# Patient Record
Sex: MALE | Race: WHITE | NOT HISPANIC OR LATINO
[De-identification: names, ages, dates, MRNs, and addresses within clinical notes are randomized per-mention and may not be internally consistent; named-entity substitution may affect disease eponyms.]

---

## 2017-12-23 ENCOUNTER — HOSPITAL (OUTPATIENT)
Dept: OTHER | Age: 32
End: 2017-12-23
Attending: NEUROLOGICAL SURGERY

## 2020-04-03 PROBLEM — R13.10 ODYNOPHAGIA: Status: ACTIVE | Noted: 2020-04-03

## 2020-04-03 PROBLEM — R14.0 BLOATING: Status: ACTIVE | Noted: 2020-04-03

## 2020-04-03 PROBLEM — K21.9 GASTROESOPHAGEAL REFLUX DISEASE WITHOUT ESOPHAGITIS: Status: ACTIVE | Noted: 2020-04-03

## 2023-05-25 ENCOUNTER — HOSPITAL ENCOUNTER (OUTPATIENT)
Age: 38
Discharge: HOME | End: 2023-05-25
Payer: COMMERCIAL

## 2023-05-25 VITALS
SYSTOLIC BLOOD PRESSURE: 151 MMHG | OXYGEN SATURATION: 99 % | HEART RATE: 80 BPM | RESPIRATION RATE: 16 BRPM | TEMPERATURE: 98 F | DIASTOLIC BLOOD PRESSURE: 86 MMHG

## 2023-05-25 DIAGNOSIS — J02.9 ACUTE VIRAL PHARYNGITIS: Primary | ICD-10-CM

## 2023-05-25 LAB — S PYO AG THROAT QL: NEGATIVE

## 2023-05-25 RX ORDER — AMOXICILLIN 500 MG/1
500 TABLET, FILM COATED ORAL 2 TIMES DAILY
Qty: 20 TABLET | Refills: 0 | Status: SHIPPED | OUTPATIENT
Start: 2023-05-25 | End: 2023-06-04

## 2023-05-25 NOTE — DISCHARGE INSTRUCTIONS
Year rapid strep swab was negative, however, due to symptoms, physical exam and positive strep contacts at home, will prescribe antibiotics. Watch and wait antibiotics. If your symptoms worsen in the next 24 to 48 hours, you may initiate antibiotics. Tylenol before hours and Motrin for 6 hours. Drink plenty of water stay well-hydrated. Okaying cessation as smoke will irritate throat more. Avoid foods that are fatty, fried, spicy, citrus, acidic. Avoid carbonated, acidic, citrus beverages. If you have any new or worsening pain, chest pain, dizziness, lightheadedness, palpitation, shortness of breath, please go to ER.

## 2023-07-19 ENCOUNTER — HOSPITAL ENCOUNTER (OUTPATIENT)
Age: 38
Discharge: HOME | End: 2023-07-19
Attending: EMERGENCY MEDICINE
Payer: COMMERCIAL

## 2023-07-19 VITALS
SYSTOLIC BLOOD PRESSURE: 133 MMHG | OXYGEN SATURATION: 98 % | RESPIRATION RATE: 20 BRPM | DIASTOLIC BLOOD PRESSURE: 87 MMHG | HEART RATE: 90 BPM | TEMPERATURE: 97 F

## 2023-07-19 DIAGNOSIS — S39.012A STRAIN OF LUMBAR REGION, INITIAL ENCOUNTER: Primary | ICD-10-CM

## 2023-07-19 LAB
BILIRUB UR QL STRIP: NEGATIVE
CLARITY UR: CLEAR
COLOR UR: YELLOW
GLUCOSE UR STRIP-MCNC: NEGATIVE MG/DL
LEUKOCYTE ESTERASE UR QL STRIP: NEGATIVE
NITRITE UR QL STRIP: NEGATIVE
PH UR STRIP: 5.5 [PH]
PROT UR STRIP-MCNC: NEGATIVE MG/DL
SP GR UR STRIP: 1.02
UROBILINOGEN UR STRIP-ACNC: <2 MG/DL

## 2023-07-19 PROCEDURE — 99213 OFFICE O/P EST LOW 20 MIN: CPT

## 2023-07-19 PROCEDURE — 99214 OFFICE O/P EST MOD 30 MIN: CPT

## 2023-07-19 PROCEDURE — 81002 URINALYSIS NONAUTO W/O SCOPE: CPT

## 2023-07-19 RX ORDER — CYCLOBENZAPRINE HCL 5 MG
5 TABLET ORAL 3 TIMES DAILY PRN
Qty: 21 TABLET | Refills: 0 | Status: SHIPPED | OUTPATIENT
Start: 2023-07-19

## 2023-11-07 ENCOUNTER — HOSPITAL ENCOUNTER (EMERGENCY)
Facility: HOSPITAL | Age: 38
Discharge: HOME | End: 2023-11-07
Attending: EMERGENCY MEDICINE
Payer: COMMERCIAL

## 2023-11-07 VITALS
RESPIRATION RATE: 15 BRPM | HEART RATE: 67 BPM | OXYGEN SATURATION: 100 % | SYSTOLIC BLOOD PRESSURE: 107 MMHG | DIASTOLIC BLOOD PRESSURE: 67 MMHG | TEMPERATURE: 99 F

## 2023-11-07 DIAGNOSIS — M54.31 SCIATICA OF RIGHT SIDE: Primary | ICD-10-CM

## 2023-11-07 PROCEDURE — 99283 EMERGENCY DEPT VISIT LOW MDM: CPT

## 2023-11-07 RX ORDER — HYDROCODONE BITARTRATE AND ACETAMINOPHEN 5; 325 MG/1; MG/1
1 TABLET ORAL ONCE
Status: COMPLETED | OUTPATIENT
Start: 2023-11-07 | End: 2023-11-07

## 2023-11-07 RX ORDER — GABAPENTIN 300 MG/1
CAPSULE ORAL
Qty: 87 CAPSULE | Refills: 0 | Status: SHIPPED | OUTPATIENT
Start: 2023-11-07 | End: 2023-12-06

## 2023-11-07 RX ORDER — PREDNISONE 20 MG/1
60 TABLET ORAL DAILY
Qty: 15 TABLET | Refills: 0 | Status: ON HOLD | OUTPATIENT
Start: 2023-11-07 | End: 2023-11-08

## 2023-11-07 RX ORDER — HYDROCODONE BITARTRATE AND ACETAMINOPHEN 7.5; 325 MG/1; MG/1
1 TABLET ORAL EVERY 6 HOURS PRN
Qty: 10 TABLET | Refills: 0 | Status: SHIPPED | OUTPATIENT
Start: 2023-11-07 | End: 2023-11-08

## 2023-11-07 NOTE — ED INITIAL ASSESSMENT (HPI)
Pt to ED via triage c/o L4 L5 back pain. Per patient, he has a hx of herniation and being managed with therapy, but feels he re injured it last night around 1700. Pain is all throughout back and down RLE and groin. RLE pain has been ongoing x 6 mo. No urinary or bowel incontinence.  Denies numbness/tingling

## 2023-11-08 ENCOUNTER — ANESTHESIA EVENT (OUTPATIENT)
Dept: SURGERY | Facility: HOSPITAL | Age: 38
End: 2023-11-08
Payer: COMMERCIAL

## 2023-11-08 ENCOUNTER — APPOINTMENT (OUTPATIENT)
Dept: MRI IMAGING | Facility: HOSPITAL | Age: 38
End: 2023-11-08
Attending: EMERGENCY MEDICINE
Payer: COMMERCIAL

## 2023-11-08 ENCOUNTER — APPOINTMENT (OUTPATIENT)
Dept: GENERAL RADIOLOGY | Facility: HOSPITAL | Age: 38
End: 2023-11-08
Attending: NEUROLOGICAL SURGERY
Payer: COMMERCIAL

## 2023-11-08 ENCOUNTER — APPOINTMENT (OUTPATIENT)
Dept: MRI IMAGING | Facility: HOSPITAL | Age: 38
DRG: 519 | End: 2023-11-08
Attending: EMERGENCY MEDICINE
Payer: COMMERCIAL

## 2023-11-08 ENCOUNTER — APPOINTMENT (OUTPATIENT)
Dept: GENERAL RADIOLOGY | Facility: HOSPITAL | Age: 38
DRG: 519 | End: 2023-11-08
Attending: NEUROLOGICAL SURGERY
Payer: COMMERCIAL

## 2023-11-08 ENCOUNTER — HOSPITAL ENCOUNTER (OUTPATIENT)
Facility: HOSPITAL | Age: 38
Setting detail: OBSERVATION
Discharge: HOME | DRG: 519 | End: 2023-11-09
Attending: EMERGENCY MEDICINE | Admitting: INTERNAL MEDICINE
Payer: COMMERCIAL

## 2023-11-08 ENCOUNTER — HOSPITAL ENCOUNTER (OUTPATIENT)
Facility: HOSPITAL | Age: 38
Setting detail: OBSERVATION
Discharge: HOME | End: 2023-11-09
Attending: EMERGENCY MEDICINE | Admitting: INTERNAL MEDICINE
Payer: COMMERCIAL

## 2023-11-08 ENCOUNTER — HOSPITAL ENCOUNTER (INPATIENT)
Facility: HOSPITAL | Age: 38
LOS: 1 days | Discharge: HOME | End: 2023-11-09
Attending: EMERGENCY MEDICINE | Admitting: INTERNAL MEDICINE
Payer: COMMERCIAL

## 2023-11-08 ENCOUNTER — ANESTHESIA (OUTPATIENT)
Dept: SURGERY | Facility: HOSPITAL | Age: 38
End: 2023-11-08
Payer: COMMERCIAL

## 2023-11-08 DIAGNOSIS — G83.4 CAUDA EQUINA COMPRESSION (HCC): Primary | ICD-10-CM

## 2023-11-08 LAB
ANION GAP SERPL CALC-SCNC: 5 MMOL/L (ref 0–18)
BASOPHILS # BLD AUTO: 0.05 X10(3) UL (ref 0–0.2)
BASOPHILS NFR BLD AUTO: 0.7 %
BUN BLD-MCNC: 10 MG/DL (ref 9–23)
BUN/CREAT SERPL: 9.3 (ref 10–20)
CALCIUM BLD-MCNC: 9.9 MG/DL (ref 8.7–10.4)
CHLORIDE SERPL-SCNC: 105 MMOL/L (ref 98–112)
CO2 SERPL-SCNC: 29 MMOL/L (ref 21–32)
CREAT BLD-MCNC: 1.08 MG/DL
DEPRECATED RDW RBC AUTO: 43.3 FL (ref 35.1–46.3)
EGFRCR SERPLBLD CKD-EPI 2021: 89 ML/MIN/1.73M2 (ref 60–?)
EOSINOPHIL # BLD AUTO: 0.09 X10(3) UL (ref 0–0.7)
EOSINOPHIL NFR BLD AUTO: 1.2 %
ERYTHROCYTE [DISTWIDTH] IN BLOOD BY AUTOMATED COUNT: 12.4 % (ref 11–15)
GLUCOSE BLD-MCNC: 108 MG/DL (ref 70–99)
HCT VFR BLD AUTO: 45 %
HGB BLD-MCNC: 15.8 G/DL
IMM GRANULOCYTES # BLD AUTO: 0.03 X10(3) UL (ref 0–1)
IMM GRANULOCYTES NFR BLD: 0.4 %
LYMPHOCYTES # BLD AUTO: 1.87 X10(3) UL (ref 1–4)
LYMPHOCYTES NFR BLD AUTO: 25.1 %
MCH RBC QN AUTO: 32.8 PG (ref 26–34)
MCHC RBC AUTO-ENTMCNC: 35.1 G/DL (ref 31–37)
MCV RBC AUTO: 93.6 FL
MONOCYTES # BLD AUTO: 0.52 X10(3) UL (ref 0.1–1)
MONOCYTES NFR BLD AUTO: 7 %
NEUTROPHILS # BLD AUTO: 4.9 X10 (3) UL (ref 1.5–7.7)
NEUTROPHILS # BLD AUTO: 4.9 X10(3) UL (ref 1.5–7.7)
NEUTROPHILS NFR BLD AUTO: 65.6 %
OSMOLALITY SERPL CALC.SUM OF ELEC: 288 MOSM/KG (ref 275–295)
PLATELET # BLD AUTO: 180 10(3)UL (ref 150–450)
POTASSIUM SERPL-SCNC: 4 MMOL/L (ref 3.5–5.1)
RBC # BLD AUTO: 4.81 X10(6)UL
SODIUM SERPL-SCNC: 139 MMOL/L (ref 136–145)
WBC # BLD AUTO: 7.5 X10(3) UL (ref 4–11)

## 2023-11-08 PROCEDURE — 99223 1ST HOSP IP/OBS HIGH 75: CPT | Performed by: INTERNAL MEDICINE

## 2023-11-08 PROCEDURE — 72148 MRI LUMBAR SPINE W/O DYE: CPT | Performed by: EMERGENCY MEDICINE

## 2023-11-08 PROCEDURE — 00NY0ZZ RELEASE LUMBAR SPINAL CORD, OPEN APPROACH: ICD-10-PCS | Performed by: NEUROLOGICAL SURGERY

## 2023-11-08 PROCEDURE — 0SB20ZZ EXCISION OF LUMBAR VERTEBRAL DISC, OPEN APPROACH: ICD-10-PCS | Performed by: NEUROLOGICAL SURGERY

## 2023-11-08 PROCEDURE — 76000 FLUOROSCOPY <1 HR PHYS/QHP: CPT | Performed by: NEUROLOGICAL SURGERY

## 2023-11-08 RX ORDER — BUPIVACAINE HYDROCHLORIDE AND EPINEPHRINE 5; 5 MG/ML; UG/ML
INJECTION, SOLUTION PERINEURAL AS NEEDED
Status: DISCONTINUED | OUTPATIENT
Start: 2023-11-08 | End: 2023-11-08 | Stop reason: HOSPADM

## 2023-11-08 RX ORDER — NALOXONE HYDROCHLORIDE 0.4 MG/ML
0.08 INJECTION, SOLUTION INTRAMUSCULAR; INTRAVENOUS; SUBCUTANEOUS AS NEEDED
Status: DISCONTINUED | OUTPATIENT
Start: 2023-11-08 | End: 2023-11-08 | Stop reason: HOSPADM

## 2023-11-08 RX ORDER — IBUPROFEN 600 MG/1
600 TABLET ORAL ONCE
COMMUNITY
End: 2023-11-09

## 2023-11-08 RX ORDER — MORPHINE SULFATE 4 MG/ML
2 INJECTION, SOLUTION INTRAMUSCULAR; INTRAVENOUS EVERY 10 MIN PRN
Status: DISCONTINUED | OUTPATIENT
Start: 2023-11-08 | End: 2023-11-08 | Stop reason: HOSPADM

## 2023-11-08 RX ORDER — LIDOCAINE HYDROCHLORIDE 10 MG/ML
INJECTION, SOLUTION EPIDURAL; INFILTRATION; INTRACAUDAL; PERINEURAL AS NEEDED
Status: DISCONTINUED | OUTPATIENT
Start: 2023-11-08 | End: 2023-11-08 | Stop reason: SURG

## 2023-11-08 RX ORDER — MAGNESIUM SULFATE HEPTAHYDRATE 500 MG/ML
INJECTION, SOLUTION INTRAMUSCULAR; INTRAVENOUS AS NEEDED
Status: DISCONTINUED | OUTPATIENT
Start: 2023-11-08 | End: 2023-11-08 | Stop reason: SURG

## 2023-11-08 RX ORDER — BUPROPION HYDROCHLORIDE 300 MG/1
300 TABLET ORAL
Status: DISCONTINUED | OUTPATIENT
Start: 2023-11-08 | End: 2023-11-09

## 2023-11-08 RX ORDER — ENEMA 19; 7 G/133ML; G/133ML
1 ENEMA RECTAL ONCE AS NEEDED
Status: DISCONTINUED | OUTPATIENT
Start: 2023-11-08 | End: 2023-11-09

## 2023-11-08 RX ORDER — SODIUM CHLORIDE, SODIUM LACTATE, POTASSIUM CHLORIDE, CALCIUM CHLORIDE 600; 310; 30; 20 MG/100ML; MG/100ML; MG/100ML; MG/100ML
INJECTION, SOLUTION INTRAVENOUS CONTINUOUS PRN
Status: DISCONTINUED | OUTPATIENT
Start: 2023-11-08 | End: 2023-11-08 | Stop reason: SURG

## 2023-11-08 RX ORDER — DOCUSATE SODIUM 100 MG/1
100 CAPSULE, LIQUID FILLED ORAL 2 TIMES DAILY
Status: DISCONTINUED | OUTPATIENT
Start: 2023-11-08 | End: 2023-11-09

## 2023-11-08 RX ORDER — HYDROMORPHONE HYDROCHLORIDE 1 MG/ML
0.2 INJECTION, SOLUTION INTRAMUSCULAR; INTRAVENOUS; SUBCUTANEOUS EVERY 5 MIN PRN
Status: DISCONTINUED | OUTPATIENT
Start: 2023-11-08 | End: 2023-11-08 | Stop reason: HOSPADM

## 2023-11-08 RX ORDER — HYDROCODONE BITARTRATE AND ACETAMINOPHEN 5; 325 MG/1; MG/1
1 TABLET ORAL EVERY 4 HOURS PRN
Status: DISCONTINUED | OUTPATIENT
Start: 2023-11-08 | End: 2023-11-09

## 2023-11-08 RX ORDER — PANTOPRAZOLE SODIUM 40 MG/1
40 TABLET, DELAYED RELEASE ORAL
Status: DISCONTINUED | OUTPATIENT
Start: 2023-11-09 | End: 2023-11-09

## 2023-11-08 RX ORDER — DIPHENHYDRAMINE HYDROCHLORIDE 50 MG/ML
25 INJECTION INTRAMUSCULAR; INTRAVENOUS EVERY 4 HOURS PRN
Status: DISCONTINUED | OUTPATIENT
Start: 2023-11-08 | End: 2023-11-09

## 2023-11-08 RX ORDER — PROCHLORPERAZINE EDISYLATE 5 MG/ML
5 INJECTION INTRAMUSCULAR; INTRAVENOUS EVERY 8 HOURS PRN
Status: DISCONTINUED | OUTPATIENT
Start: 2023-11-08 | End: 2023-11-09

## 2023-11-08 RX ORDER — CEFAZOLIN SODIUM/WATER 2 G/20 ML
2 SYRINGE (ML) INTRAVENOUS ONCE
Status: COMPLETED | OUTPATIENT
Start: 2023-11-08 | End: 2023-11-08

## 2023-11-08 RX ORDER — METHYLPREDNISOLONE ACETATE 40 MG/ML
INJECTION, SUSPENSION INTRA-ARTICULAR; INTRALESIONAL; INTRAMUSCULAR; SOFT TISSUE AS NEEDED
Status: DISCONTINUED | OUTPATIENT
Start: 2023-11-08 | End: 2023-11-08 | Stop reason: HOSPADM

## 2023-11-08 RX ORDER — HYDROMORPHONE HYDROCHLORIDE 1 MG/ML
0.6 INJECTION, SOLUTION INTRAMUSCULAR; INTRAVENOUS; SUBCUTANEOUS EVERY 5 MIN PRN
Status: DISCONTINUED | OUTPATIENT
Start: 2023-11-08 | End: 2023-11-08 | Stop reason: HOSPADM

## 2023-11-08 RX ORDER — ORPHENADRINE CITRATE 30 MG/ML
30 INJECTION INTRAMUSCULAR; INTRAVENOUS ONCE
Status: DISCONTINUED | OUTPATIENT
Start: 2023-11-08 | End: 2023-11-08

## 2023-11-08 RX ORDER — HYDROMORPHONE HYDROCHLORIDE 1 MG/ML
0.2 INJECTION, SOLUTION INTRAMUSCULAR; INTRAVENOUS; SUBCUTANEOUS EVERY 2 HOUR PRN
Status: DISCONTINUED | OUTPATIENT
Start: 2023-11-08 | End: 2023-11-09

## 2023-11-08 RX ORDER — ROCURONIUM BROMIDE 10 MG/ML
INJECTION, SOLUTION INTRAVENOUS AS NEEDED
Status: DISCONTINUED | OUTPATIENT
Start: 2023-11-08 | End: 2023-11-08 | Stop reason: SURG

## 2023-11-08 RX ORDER — MORPHINE SULFATE 4 MG/ML
4 INJECTION, SOLUTION INTRAMUSCULAR; INTRAVENOUS ONCE
Status: DISCONTINUED | OUTPATIENT
Start: 2023-11-08 | End: 2023-11-08

## 2023-11-08 RX ORDER — BUPROPION HYDROCHLORIDE 300 MG/1
300 TABLET ORAL
COMMUNITY
Start: 2023-09-13

## 2023-11-08 RX ORDER — SENNOSIDES 8.6 MG
17.2 TABLET ORAL NIGHTLY
Status: DISCONTINUED | OUTPATIENT
Start: 2023-11-08 | End: 2023-11-09

## 2023-11-08 RX ORDER — BISACODYL 10 MG
10 SUPPOSITORY, RECTAL RECTAL
Status: DISCONTINUED | OUTPATIENT
Start: 2023-11-08 | End: 2023-11-09

## 2023-11-08 RX ORDER — ONDANSETRON 2 MG/ML
INJECTION INTRAMUSCULAR; INTRAVENOUS AS NEEDED
Status: DISCONTINUED | OUTPATIENT
Start: 2023-11-08 | End: 2023-11-08 | Stop reason: SURG

## 2023-11-08 RX ORDER — HYDROCODONE BITARTRATE AND ACETAMINOPHEN 5; 325 MG/1; MG/1
1 TABLET ORAL EVERY 6 HOURS PRN
Qty: 28 TABLET | Refills: 0 | Status: SHIPPED | OUTPATIENT
Start: 2023-11-08 | End: 2023-11-15

## 2023-11-08 RX ORDER — ORPHENADRINE CITRATE 30 MG/ML
30 INJECTION INTRAMUSCULAR; INTRAVENOUS ONCE
Status: COMPLETED | OUTPATIENT
Start: 2023-11-08 | End: 2023-11-08

## 2023-11-08 RX ORDER — ONDANSETRON 2 MG/ML
4 INJECTION INTRAMUSCULAR; INTRAVENOUS EVERY 6 HOURS PRN
Status: DISCONTINUED | OUTPATIENT
Start: 2023-11-08 | End: 2023-11-09

## 2023-11-08 RX ORDER — KETAMINE HYDROCHLORIDE 50 MG/ML
INJECTION, SOLUTION, CONCENTRATE INTRAMUSCULAR; INTRAVENOUS AS NEEDED
Status: DISCONTINUED | OUTPATIENT
Start: 2023-11-08 | End: 2023-11-08 | Stop reason: SURG

## 2023-11-08 RX ORDER — MORPHINE SULFATE 4 MG/ML
4 INJECTION, SOLUTION INTRAMUSCULAR; INTRAVENOUS EVERY 10 MIN PRN
Status: DISCONTINUED | OUTPATIENT
Start: 2023-11-08 | End: 2023-11-08 | Stop reason: HOSPADM

## 2023-11-08 RX ORDER — HYDROMORPHONE HYDROCHLORIDE 1 MG/ML
0.4 INJECTION, SOLUTION INTRAMUSCULAR; INTRAVENOUS; SUBCUTANEOUS EVERY 5 MIN PRN
Status: DISCONTINUED | OUTPATIENT
Start: 2023-11-08 | End: 2023-11-08 | Stop reason: HOSPADM

## 2023-11-08 RX ORDER — MORPHINE SULFATE 10 MG/ML
6 INJECTION, SOLUTION INTRAMUSCULAR; INTRAVENOUS EVERY 10 MIN PRN
Status: DISCONTINUED | OUTPATIENT
Start: 2023-11-08 | End: 2023-11-08 | Stop reason: HOSPADM

## 2023-11-08 RX ORDER — CEFAZOLIN SODIUM/WATER 2 G/20 ML
2 SYRINGE (ML) INTRAVENOUS EVERY 8 HOURS
Status: COMPLETED | OUTPATIENT
Start: 2023-11-08 | End: 2023-11-09

## 2023-11-08 RX ORDER — HYDROCODONE BITARTRATE AND ACETAMINOPHEN 10; 325 MG/1; MG/1
2 TABLET ORAL EVERY 4 HOURS PRN
Status: DISCONTINUED | OUTPATIENT
Start: 2023-11-08 | End: 2023-11-09

## 2023-11-08 RX ORDER — MORPHINE SULFATE 2 MG/ML
2 INJECTION, SOLUTION INTRAMUSCULAR; INTRAVENOUS ONCE
Status: COMPLETED | OUTPATIENT
Start: 2023-11-08 | End: 2023-11-08

## 2023-11-08 RX ORDER — METHOCARBAMOL 500 MG/1
500 TABLET, FILM COATED ORAL 2 TIMES DAILY PRN
Status: DISCONTINUED | OUTPATIENT
Start: 2023-11-08 | End: 2023-11-09

## 2023-11-08 RX ORDER — HYDROMORPHONE HYDROCHLORIDE 1 MG/ML
0.4 INJECTION, SOLUTION INTRAMUSCULAR; INTRAVENOUS; SUBCUTANEOUS EVERY 2 HOUR PRN
Status: DISCONTINUED | OUTPATIENT
Start: 2023-11-08 | End: 2023-11-09

## 2023-11-08 RX ORDER — DIPHENHYDRAMINE HCL 25 MG
25 CAPSULE ORAL EVERY 4 HOURS PRN
Status: DISCONTINUED | OUTPATIENT
Start: 2023-11-08 | End: 2023-11-09

## 2023-11-08 RX ORDER — METHOCARBAMOL 750 MG/1
750 TABLET, FILM COATED ORAL 3 TIMES DAILY
Qty: 30 TABLET | Refills: 0 | Status: SHIPPED | OUTPATIENT
Start: 2023-11-08 | End: 2023-11-18

## 2023-11-08 RX ORDER — DEXAMETHASONE SODIUM PHOSPHATE 4 MG/ML
VIAL (ML) INJECTION AS NEEDED
Status: DISCONTINUED | OUTPATIENT
Start: 2023-11-08 | End: 2023-11-08 | Stop reason: SURG

## 2023-11-08 RX ORDER — SODIUM CHLORIDE, SODIUM LACTATE, POTASSIUM CHLORIDE, CALCIUM CHLORIDE 600; 310; 30; 20 MG/100ML; MG/100ML; MG/100ML; MG/100ML
INJECTION, SOLUTION INTRAVENOUS CONTINUOUS
Status: DISCONTINUED | OUTPATIENT
Start: 2023-11-08 | End: 2023-11-08 | Stop reason: HOSPADM

## 2023-11-08 RX ORDER — POLYETHYLENE GLYCOL 3350 17 G/17G
17 POWDER, FOR SOLUTION ORAL DAILY PRN
Status: DISCONTINUED | OUTPATIENT
Start: 2023-11-08 | End: 2023-11-09

## 2023-11-08 RX ORDER — ONDANSETRON 2 MG/ML
INJECTION INTRAMUSCULAR; INTRAVENOUS
Status: DISPENSED
Start: 2023-11-08 | End: 2023-11-08

## 2023-11-08 RX ADMIN — SODIUM CHLORIDE, SODIUM LACTATE, POTASSIUM CHLORIDE, CALCIUM CHLORIDE: 600; 310; 30; 20 INJECTION, SOLUTION INTRAVENOUS at 10:15:00

## 2023-11-08 RX ADMIN — DEXAMETHASONE SODIUM PHOSPHATE 8 MG: 4 MG/ML VIAL (ML) INJECTION at 10:24:00

## 2023-11-08 RX ADMIN — KETAMINE HYDROCHLORIDE 50 MG: 50 INJECTION, SOLUTION, CONCENTRATE INTRAMUSCULAR; INTRAVENOUS at 10:20:00

## 2023-11-08 RX ADMIN — SODIUM CHLORIDE, SODIUM LACTATE, POTASSIUM CHLORIDE, CALCIUM CHLORIDE: 600; 310; 30; 20 INJECTION, SOLUTION INTRAVENOUS at 10:14:00

## 2023-11-08 RX ADMIN — CEFAZOLIN SODIUM/WATER 2 G: 2 G/20 ML SYRINGE (ML) INTRAVENOUS at 10:35:00

## 2023-11-08 RX ADMIN — ONDANSETRON 4 MG: 2 INJECTION INTRAMUSCULAR; INTRAVENOUS at 10:56:00

## 2023-11-08 RX ADMIN — SODIUM CHLORIDE, SODIUM LACTATE, POTASSIUM CHLORIDE, CALCIUM CHLORIDE: 600; 310; 30; 20 INJECTION, SOLUTION INTRAVENOUS at 10:56:00

## 2023-11-08 RX ADMIN — KETAMINE HYDROCHLORIDE 25 MG: 50 INJECTION, SOLUTION, CONCENTRATE INTRAMUSCULAR; INTRAVENOUS at 11:09:00

## 2023-11-08 RX ADMIN — ROCURONIUM BROMIDE 50 MG: 10 INJECTION, SOLUTION INTRAVENOUS at 10:20:00

## 2023-11-08 RX ADMIN — LIDOCAINE HYDROCHLORIDE 100 MG: 10 INJECTION, SOLUTION EPIDURAL; INFILTRATION; INTRACAUDAL; PERINEURAL at 10:18:00

## 2023-11-08 RX ADMIN — MAGNESIUM SULFATE HEPTAHYDRATE 2 G: 500 INJECTION, SOLUTION INTRAMUSCULAR; INTRAVENOUS at 10:12:00

## 2023-11-08 NOTE — ANESTHESIA PROCEDURE NOTES
Peripheral IV  Date/Time: 11/8/2023 10:24 AM  Inserted by: Haritha Victoria CRNA    Placement  Needle size: 16 G  Laterality: left  Location: hand  Local anesthetic: none  Site prep: alcohol  Technique: anatomical landmarks  Attempts: 1 (LR moved to this line.)

## 2023-11-08 NOTE — PHYSICAL THERAPY NOTE
PHYSICAL THERAPY EVALUATION - INPATIENT     Room Number: 281/100-V  Evaluation Date: 11/8/2023  Type of Evaluation: Initial   Physician Order: PT Eval and Treat    Presenting Problem: S/P L4-5 laminectomy, discectomy 11/8/23  Co-Morbidities : L4-5 herniated disc, sciatica, cauda equina compression  Reason for Therapy: Mobility Dysfunction and Discharge Planning    PHYSICAL THERAPY ASSESSMENT     Patient is a 34year old male admitted 11/8/2023 S/P L4-5 laminectomy, discectomy 11/8/23. Patient received supine in bed, agreeable to therapy evaluation. Vital signs monitored as noted below, no adverse symptoms and patient stable during session. Patient demonstrates independence without an assistive device with functional mobility skills as noted below. Pt educated on spinal precautions, log roll technique, safe gentle HEP. Pt tolerating household and community distances without use of an assistive device as well as number of stairs required to enter home. Patient verbalizes no further mobility concerns at this time, is functioning safely with mobility to D/C at this level of care. Updated nurse on results of session, patient left seated in bedside chair, all lines intact, all needs met with call light in reach. Patient history and/or personal factors that may impact the plan of care include home accessibility concerns, co-morbidities (L4-5 herniated disc, sciatica, cauda equina compression) affecting medical status and pain levels, and longstanding history of pain. Based on the physical therapy examination of the noted systems and mobility skills, the patient presentation is stable given the patient demonstrates no significant barriers to meeting therapy goals. Physical Therapy to sign off at this time, please re-consult if patient experiences a decline in functional status.     The patient's Approx Degree of Impairment: 35.83% has been calculated based on documentation in the [facility] '6 clicks' Inpatient Basic Mobility Short Form. Research supports that patients with this level of impairment may benefit from home with no services, however recommend OP PT once medically cleared to optimize body mechanics, facilitate safe return to full activity level, and provide form of conservative pain management. DISCHARGE RECOMMENDATIONS  PT Discharge Recommendations: Home;Outpatient PT    PLAN    Patient has been evaluated and presents with no skilled Physical Therapy needs at this time. Patient will be discharged from Physical Therapy services. Please re-order if a new functional limitation presents during this admission. PHYSICAL THERAPY MEDICAL/SOCIAL HISTORY     Problem List  Principal Problem:    Cauda equina compression ([address]      HOME SITUATION  Home Situation  Type of Home: House  Home Layout: Multi-level; Able to live on main level  Stairs to Enter : 7  Railing: Yes  Lives With: Spouse  Drives: Yes (Vivek Mccarty owner)  Patient Owned Equipment: None  Patient Regularly Uses: Glasses     Prior Level of [place]: independent no assistive device    SUBJECTIVE  \"I feel so much better, I couldn't even walk before surgery. \"    PHYSICAL THERAPY EXAMINATION     OBJECTIVE  Precautions: Spine  Fall Risk: Standard fall risk    WEIGHT BEARING RESTRICTION  None    PAIN ASSESSMENT  Ratin  Location: low back  Management Techniques: Body mechanics; Activity promotion    COGNITION  Overall Cognitive Status:  WFL - within functional limits    RANGE OF MOTION AND STRENGTH ASSESSMENT  Upper extremity ROM and strength are within functional limits  BUEs within spinal precautions   Lower extremity ROM is within functional limits  BLEs within spinal precautions   Lower extremity strength is within functional limits  BLEs within spinal precautions     BALANCE  Static Sitting: Normal  Dynamic Sitting: Normal  Static Standing: Normal  Dynamic Standing: Normal      NEUROLOGICAL FINDINGS           Sensation: WNL, improved and sx resolved following surgery          ACTIVITY TOLERANCE  Pulse: 81  Heart Rate Source: Monitor     BP: 121/74  BP Location: Right arm  BP Method: Automatic  Patient Position: Lying    O2 WALK  Oxygen Therapy  SPO2% on Room Air at Rest: 93    AM-PAC '6-Clicks' INPATIENT SHORT FORM - BASIC MOBILITY  How much difficulty does the patient currently have. .. Patient Difficulty: Turning over in bed (including adjusting bedclothes, sheets and blankets)?: A Little   Patient Difficulty: Sitting down on and standing up from a chair with arms (e.g., wheelchair, bedside commode, etc.): None   Patient Difficulty: Moving from lying on back to sitting on the side of the bed?: A Little   How much help from another person does the patient currently need. .. Help from Another: Moving to and from a bed to a chair (including a wheelchair)?: None   Help from Another: Need to walk in hospital room?: A Little   Help from Another: Climbing 3-5 steps with a railing?: A Little     AM-PAC Score:  Raw Score: 20   Approx Degree of Impairment: 35.83%   Standardized Score (AM-PAC Scale): 47.67   CMS Modifier (G-Code): CJ    FUNCTIONAL ABILITY STATUS  Functional Mobility/Gait Assessment  Gait Assistance: Supervision; Independent  Distance (ft): 450'  Assistive Device: None  Pattern: Within Functional Limits  Stairs: Stairs  How Many Stairs: 7  Device: 1 Rail  Assist: Supervision  Pattern: Ascend and Descend  Ascend and Descend : Step to      Exercise/Education Provided:  Bed mobility  Body mechanics  Energy conservation  Functional activity tolerated  Gait training  Posture  ROM  Strengthening  Transfer training  Precautions     Patient End of Session: Up in chair;Needs met;Call light within reach;RN aware of session/findings; All patient questions and concerns addressed    Patient was able to achieve the following . ..    Patient able to transfer  Safely and independently    Patient able to ambulate on level surfaces  Safely and independently Patient Evaluation Complexity Level:  History Moderate - 1 or 2 personal factors and/or co-morbidities   Examination of body systems Low - addressing 1-2 elements   Clinical Presentation Low - Stable   Clinical Decision Making Low Complexity       Gait Training: 10 minutes  Therapeutic Activity: 14 minutes      Tamar Dougherty PT, DPT  [facility]  Inpatient Rehabilitation  Physical Therapy  [phone]

## 2023-11-08 NOTE — ANESTHESIA PROCEDURE NOTES
Airway  Date/Time: 11/8/2023 10:22 AM  Urgency: Elective    Airway not difficult    General Information and Staff    Patient location during procedure: OR  Anesthesiologist: Lai Stratton MD  Resident/CRNA: Keely Ty CRNA  Performed: CRNA   Performed by: Keely Ty CRNA  Authorized by: Lai Stratton MD      Indications and Patient Condition  Indications for airway management: anesthesia  Sedation level: deep  Preoxygenated: yes  Patient position: sniffing  Mask difficulty assessment: 3 - difficult mask (inadequate, unstable or two providers) +/- NMBA (full face beard, difficult seal. 2 mask straps, 100 mm OPA, vent on pressure support yields TV>600ml.)    Final Airway Details  Final airway type: endotracheal airway      Successful airway: ETT  Cuffed: yes   Successful intubation technique: direct laryngoscopy  Endotracheal tube insertion site: oral  Blade size: #4  ETT size (mm): 7.5    Cormack-Lehane Classification: grade IIA - partial view of glottis  Placement verified by: capnometry   Cuff volume (mL): 6  Measured from: teeth  ETT to teeth (cm): 22  Number of attempts at approach: 1    Additional Comments  Atraumatic insertion, dentition and soft structures as preop. Soft bite block inserted between left molars, tongue midline.

## 2023-11-08 NOTE — ED QUICK NOTES
Orders for admission, patient is aware of plan and ready to go upstairs. Any questions, please call ED RN tong at extension [phone].      Patient Covid vaccination status: Unvaccinated     COVID Test Ordered in ED: None    COVID Suspicion at Admission: N/A    Running Infusions:  None    Mental Status/LOC at time of transport: AXOX4    Other pertinent information:   CIWA score: N/A   NIH score:  N/A

## 2023-11-08 NOTE — OPERATIVE REPORT
OPERATIVE REPORT    PATIENT NAME: Kelle Romo    DATE OF OPERATION:  11/8/2023    PREOPERATIVE DIAGNOSIS:  1. Cauda equina syndrome             2. L4-5 HNP    POSTOPERATIVE DIAGNOSIS: 1. same               2. same    OPERATIVE PROCEDURE:  1.  Lumbar 4 through 5 laminectomy, medial facetectomies and foraminotomies   2. Lumbar 4 through 5 discectomy utilizing the microscope and microsurgical teqhnique  3. Lumbar 4 through 5 placement of 40 mg epidural Depomedrol through same incision via microscopic guidance   4. Real time intraoperative fluoroscopy and C-arm with the image guidance. SURGEON:  Guillermo Waldron M.D.    ASSISTANT: Bobette Olszewski PA-C    ANESTHESIA: General endotracheal anesthesia with TIVA and local anesthetic. ESTIMATED BLOOD LOSS: 5 cc    INTRAVENOUS FLUIDS AND URINE OUTPUT: Per anesthesia sheet    TRANSFUSION: None    IMPLANTS: none    DRAIN: none    SPECIMEN: None    COMPLICATIONS: non    PROCEDURE:    After informed consent was obtained, the patient was brought to the operating room. After the uneventful induction of general endotracheal anesthesia, The patient was then positioned on the operating room table, on a [employer], in the prone position. The arms were supported and the neck physiologically extended. All pressure points were adequately padded. The patient's eyes were protected from exposure to prolonged pressure. Subsequently, we utilized lateral and AP fluoroscopic guidance to identify our incision site relative to the lumbar bony anatomy corresponding to the correct level(s). It was marked out on the skin. The patient was prepped and draped sterilely. The C-arm was also draped sterilely into the field. Time-Out was done in the proper fashion. Perioperative antibiosis was given within one hour of incision. After the \"Time-Out\", we infiltrated the incision with our usual local anesthetic. We incised utilizing a 10-blade scalpel.  The subcutaneous tissues were opened with Bovie cautery down to the fascia. The fascia was opened sharply using both sharp and blunt dissection. The paraspinal muscles were dissected off the lateral aspect of the laminae in a subperiosteal fashion, and then a self-retaining retractor was placed. Localization was confirmed with fluoroscopy. We exposed the posterior elements, including, the medial facets of the correct lumbar levels, Lumbar 4 through 5. At this point in time the miscoscope entered the surgical field and the rest of the procedure was accomplished utilizing microsurgical technique. We removed part of the spinous processes of L4 using Leksell rongeurs. A high-speed mono was then utilized to complete the generous laminotomy opening midline to the right lateral recess. The medial facets were drilled as well to help expose the L4-5 intervertebral space and disc. The rest of the bony decompression was accomplished utilizing Kerrison rongeurs. After the soft tissues, including the ligamentum flavum, had been resected, we visualized the ipsilaterally en-passage L5 nerve root verifying adequate decompression all the way into the corresponding foramina. We used a balltip hook to feel under the dura and slowly a large extruded disc fragment was delivered. Once removed we observed free pulsation of the neurological elements and felt that we had decompressed the cauda equina. The wound was copiously irrigated and small bleeding points were controlled with a bipolar electrocautery. Still, more irrigation was used and the lumbodorsal fascia was closed using a series of interrupted 0 Vicryl sutures. The subcutaneous tissues were copiously irrigated and closed with an interrupted inverted 3-0 Vicryl suture. The skin was closed with 4-0 Vicryl and Dermabond. There were no complications. All counts were reported correct. The nerve stimulation of the screws achieved satisfactory thresholds including final placement of the instrumentation. The patient was returned to the supine position, extubated in the operating room, and taken to the recovery room in satisfactory condition, having tolerated the procedure well and moving all fours strongly to command. Please fax a copy of this report to my office at [phone] and the Primary Care Provider of this patient.

## 2023-11-08 NOTE — PLAN OF CARE
Nav Hanson M.D., F.A.A.N.S.  of [address] [place] [address]  Board Certified Neurosurgeon                                     Olena Brewer  Neurosurgery        [address] [facility], [address] [place] [address], [address] [place] [facility], [facility]    PHONE  [phone]    [email or web address]           11/8/2023  7:53 AM    PRE-OPERATIVE CONSULTATION                           Nora Gaston was again informed of the nature of the problem, planned treatment, indications and alternatives. We again reviewed the expected benefits of surgery, as well as all reasonably foreseeable risks, including but not limited to infection, bleeding requiring transfusion or reoperation, no relief or worsening of the pain, numbness, weakness, need for assistive devices to get around, injury to the nerves, paralysis, loss of control of the legs/bladder/bowel/sexual function, spinal fluid leak, scar formation, abnormal movement of the bones on each other requiring fusion surgery to fix, heart attack, blood clot formation, pulmonary embolism, stroke, coma and death. his questions were all answered and he understands what we discussed. Nora Gaston also understands that no guarantees can be made regarding outcome or results. he agrees the benefits of surgery outweigh the risks, and wishes to proceed with surgery.     Osman Mckeon M.D., F.A.A.N.S.

## 2023-11-08 NOTE — ED INITIAL ASSESSMENT (HPI)
**originally charted in paper charting**    Pt to ED for lower back pain due to L4-L5 herniated disc. Pt was in earlier in the day to ER for same issue. Pt has new symptoms including numbness to right leg, difficulty urinating, and pain in testicles.

## 2023-11-09 VITALS
TEMPERATURE: 99 F | DIASTOLIC BLOOD PRESSURE: 84 MMHG | OXYGEN SATURATION: 96 % | RESPIRATION RATE: 18 BRPM | BODY MASS INDEX: 26.52 KG/M2 | HEART RATE: 75 BPM | SYSTOLIC BLOOD PRESSURE: 121 MMHG | WEIGHT: 175 LBS | HEIGHT: 68 IN

## 2023-11-09 PROCEDURE — 99239 HOSP IP/OBS DSCHRG MGMT >30: CPT | Performed by: INTERNAL MEDICINE

## 2023-11-09 RX ORDER — ALPRAZOLAM 0.25 MG/1
0.25 TABLET ORAL NIGHTLY PRN
Status: DISCONTINUED | OUTPATIENT
Start: 2023-11-09 | End: 2023-11-09

## 2023-11-09 NOTE — PLAN OF CARE
Problem: Patient Centered Care  Goal: Patient preferences are identified and integrated in the patient's plan of care  Description: Interventions:  - What would you like us to know as we care for you?   - Provide timely, complete, and accurate information to patient/family  - Incorporate patient and family knowledge, values, beliefs, and cultural backgrounds into the planning and delivery of care  - Encourage patient/family to participate in care and decision-making at the level they choose  - Honor patient and family perspectives and choices  Outcome: Adequate for Discharge     Problem: Patient/Family Goals  Goal: Patient/Family Long Term Goal  Description: Patient's Long Term Goal:     Interventions:  -   - See additional Care Plan goals for specific interventions  Outcome: Adequate for Discharge  Goal: Patient/Family Short Term Goal  Description: Patient's Short Term Goal:     Interventions:   -   - See additional Care Plan goals for specific interventions  Outcome: Adequate for Discharge     Problem: PAIN - ADULT  Goal: Verbalizes/displays adequate comfort level or patient's stated pain goal  Description: INTERVENTIONS:  - Encourage pt to monitor pain and request assistance  - Assess pain using appropriate pain scale  - Administer analgesics based on type and severity of pain and evaluate response  - Implement non-pharmacological measures as appropriate and evaluate response  - Consider cultural and social influences on pain and pain management  - Manage/alleviate anxiety  - Utilize distraction and/or relaxation techniques  - Monitor for opioid side effects  - Notify MD/LIP if interventions unsuccessful or patient reports new pain  - Anticipate increased pain with activity and pre-medicate as appropriate  Outcome: Adequate for Discharge     Problem: RISK FOR INFECTION - ADULT  Goal: Absence of fever/infection during anticipated neutropenic period  Description: INTERVENTIONS  - Monitor WBC  - Administer growth factors as ordered  - Implement neutropenic guidelines  Outcome: Adequate for Discharge     Problem: SAFETY ADULT - FALL  Goal: Free from fall injury  Description: INTERVENTIONS:  - Assess pt frequently for physical needs  - Identify cognitive and physical deficits and behaviors that affect risk of falls. - [place] fall precautions as indicated by assessment.  - Educate pt/family on patient safety including physical limitations  - Instruct pt to call for assistance with activity based on assessment  - Modify environment to reduce risk of injury  - Provide assistive devices as appropriate  - Consider OT/PT consult to assist with strengthening/mobility  - Encourage toileting schedule  Outcome: Adequate for Discharge     Problem: DISCHARGE PLANNING  Goal: Discharge to home or other facility with appropriate resources  Description: INTERVENTIONS:  - Identify barriers to discharge w/pt and caregiver  - Include patient/family/discharge partner in discharge planning  - Arrange for needed discharge resources and transportation as appropriate  - Identify discharge learning needs (meds, wound care, etc)  - Arrange for interpreters to assist at discharge as needed  - Consider post-discharge preferences of patient/family/discharge partner  - Complete POLST form as appropriate  - Assess patient's ability to be responsible for managing their own health  - Refer to Case Management Department for coordinating discharge planning if the patient needs post-hospital services based on physician/LIP order or complex needs related to functional status, cognitive ability or social support system  Outcome: Adequate for Discharge       Anisha Benavidez is stable, Cms intact denies any numbness or tingling, and denies any pain at this time. Breathing on room air, encouraged the use of IS every hour while awake. Dermabond  Dressing to mid lower back cdi,  gel ice in use. Able to ambulate . worked with therapy.  Safety measures applied and reviewed, Non skid socks in use. bed and chair exit alarm is in use,call light within reach. Bed is in lowest position. Being discharge home. Discharge instructions reviewed by PA.

## 2023-11-09 NOTE — PLAN OF CARE
Problem: Patient/Family Goals  Goal: Patient/Family Long Term Goal  Description: Patient's Long Term Goal: Improved mobility    Interventions:  - surgery and pain meds  - See additional Care Plan goals for specific interventions  Outcome: Progressing

## 2024-02-07 ENCOUNTER — TELEPHONE (OUTPATIENT)
Dept: SURGERY | Facility: CLINIC | Age: 39
End: 2024-02-07

## 2024-02-07 NOTE — TELEPHONE ENCOUNTER
Patient notified of Mukund NARANJO note. Patient notes there is no drainage and he will watch for this to occur and notify us if he has any drainage.

## 2024-02-07 NOTE — TELEPHONE ENCOUNTER
If there is no incisional site drainage then continue physical therapy as discussed previously. If you experience a headache, caffeine can help. If at any time he sees incisional site drainage he should contact our office.

## 2024-02-07 NOTE — TELEPHONE ENCOUNTER
Patient had surgery with Dr Dinero and was instructed to f/u in 4 weeks. But is having some issue regarding scar tissue he believes because there is a buldge.

## 2024-02-07 NOTE — TELEPHONE ENCOUNTER
Noted the patient message listed below.    Called the patient to discuss concerns further:  Pt states:  L4-5 LUMBAR LAMINECTOMY, RESECTION OF DISC WITH MICROSCOPE on 11/8/2023 with Dr. Dinero    Followed up with JOSE A Abbasi in office due to  Pt is having discomfort on the lower right side with muscle twitching discomfort when sitting a long time, standing or going from sitting to standing.     Area around incision is tender and swollen with a bulge or a lump and the patient describes it as \"squishy\" and it has gotten bigger in the last 24 hours.     Pt followed up in office with Norma Milton last week to evaluate. pt states that Norma informed pt that she is not concerned and is continuing 2 day a week PT.  Follow up with Dr. Dinero in a month.      Pt states that the Incision site is a little raise and PT massages the area and assist pt with basic exercises Pt had a long day driving and in and out of the car and feels it may have contributed to the raise in the incision site    Advised pt to send a picture via [employer] message for the providers to review.   Pt states he would have to download the rolando and re-enter the account but is agreeable.     Pt acknowledged and is appreciative   Call was ended   [employer] message sent

## 2024-02-07 NOTE — TELEPHONE ENCOUNTER
Please review patient images that he sent of the lump under his incision site. Please advise on treatment plan.    Summary   This is a limited echo to assess EF and RVSP. Ejection fraction is visually estimated at 30-35%. Global hypokinesis noted. Mild tricuspid regurgitation. , RVSP of 13mmhg. No evidence of any pericardial effusion. Spoke with patient regarding results of echo. Patient voiced understanding.

## 2024-02-08 ENCOUNTER — OFFICE VISIT (OUTPATIENT)
Facility: CLINIC | Age: 39
End: 2024-02-08
Payer: COMMERCIAL

## 2024-02-08 DIAGNOSIS — Z98.890 STATUS POST LAMINECTOMY: Primary | ICD-10-CM

## 2024-02-08 PROCEDURE — 99204 OFFICE O/P NEW MOD 45 MIN: CPT | Performed by: NEUROLOGICAL SURGERY

## 2024-02-08 NOTE — PROGRESS NOTES
JOSIAH COYLE M.D., F.A.A.N.S.     of Neurosurgery  [facility]  Board Certified Neurosurgeon                              [address]  [address] [facility]  [place] [address]    PHONE  [phone]          FAX  [phone]    [email or web address]      OFFICE FOLLOW-UP NOTE            Byron Garcia    : 1985    MRN: [id number]  CSN: [id number]      PCP: RAJIV CALVILLO  Referring Provider: No ref. provider found    Insurance: Payor: [facility] PPO / Plan: BCBS PPO / Product Type: PPO /           Date of Visit: 2024    Reason for Visit:   Chief Complaint    Post-Op                         History of Present Care:  Byron Garcia is a a(n) 38 year old, male.  Our patient is approximately 3 months status post L4-5 laminectomy and discectomy.  Recently, he has experienced swelling at the site of the incision.  However, there are no incisional issues and it has healed well.  He does report some heaviness in his lower back and occasional discomfort in his lower extremities.  However, there is no focal weakness and no changes in bowel or bladder control as they were before the surgery.      History:  .  Past Medical History:   Diagnosis Date    Eye infection, right       Past Surgical History:   Procedure Laterality Date    HAND/FINGER SURGERY UNLISTED        Family History   Problem Relation Age of Onset    Cancer Father 62        Bladder      Social History     Socioeconomic History    Marital status:      Spouse name: Not on file    Number of children: Not on file    Years of education: Not on file    Highest education level: Not on file   Occupational History    Not on file   Tobacco Use    Smoking status: Every Day     Packs/day: .5     Types: Cigarettes    Smokeless tobacco: Never   Vaping Use    Vaping Use: Never used   Substance and Sexual Activity    Alcohol  use: Yes    Drug use: Never    Sexual activity: Not on file   Other Topics Concern    Not on file   Social History Narrative    Not on file     Social Determinants of Health     Financial Resource Strain: Not on file   Food Insecurity: No Food Insecurity (11/8/2023)    Food Insecurity     Food Insecurity: Never true   Transportation Needs: No Transportation Needs (11/8/2023)    Transportation Needs     Lack of Transportation: No   Physical Activity: Not on file   Stress: Not on file   Social Connections: Not on file   Housing Stability: Low Risk  (11/8/2023)    Housing Stability     Housing Instability: No     Housing Instability Emergency: Not on file        Allergies:  No Known Allergies      Medications:  Current Outpatient Medications   Medication Sig Dispense Refill    buPROPion  MG Oral Tablet 24 Hr Take 1 tablet (300 mg total) by mouth before breakfast.          Review of Systems:  A 10-point system was reviewed.  Pertinent positives and negatives are noted in HPI.      Physical Exam:  There were no vitals taken for this visit.        Neurological Exam:    AAOx3, following commands  PERRLA  EOMI  Face symmetrical  Tongue midline  Hearing symmetrical and intact to finger rub    No rhinorrhea or otorrhea    Romberg negative    Motor Strength:  Patient can ambulate independently.  He can get up on his toes and heels and he can perform a squat without assistance.    Sensation to light touch:  Symmetrical to light touch    Incision:  The incision is healed well.  In the subcutaneous tissue there is a palpable bulbous fluid collection.  It is compressible.  There is no sign of dehiscence or redness at the site of the wound.      Abdomen:  Soft, non-distended, non-tender, with no rebound or guarding.  No peritoneal signs.     Extremities:  Non-tender, no lower extremity edema noted.      Labs:  CBC:  Lab Results   Component Value Date    WBC 7.5 11/08/2023    HGB 15.8 11/08/2023    HCT 45.0 11/08/2023    MCV  93.6 11/08/2023    .0 11/08/2023      BMG:  Lab Results   Component Value Date     11/08/2023    K 4.0 11/08/2023    CO2 29.0 11/08/2023     11/08/2023    BUN 10 11/08/2023      INR:  No results found for: \"INR\", \"PROTIME\"       Diagnostics:  No new imaging to review    Diagnosis:  1. Status post laminectomy      Assessment/Plan:  Our patient most likely has a delayed cerebrospinal fluid leak associated with his pathology at L4-5.  Alternatively, this could represent a seroma that has escaped from the subfascial to the suprafas thatcial compartment.  Either way, I discussed with him that as long as there is no leakage outside, this should heal over time and the bulge should received.  We will have him continue physical therapy and we will follow-up in a month to reassess the evolution.    More than 30 minutes were spent during this visit and the coordination of this patient's care. All questions and concerns were addressed. We appreciate the opportunity to participate in the care of this patient. Please do not hesitate to call our office ([phone]) with any issues.    This note has been dictated utilizing voice recognition software. Unfortunately, this may lead to occasional typographical errors. If there are any questions regarding this, please do not hesitate to contact our office.             Virgil Dinero M.D., F.A.A.N.S.    2/8/2024  11:31 AM

## 2024-02-08 NOTE — PROGRESS NOTES
Patient is here for 3 month post op.  He is s/p L4-5 laminectomy and discectomy performed on 11-8-23 by Dr Dinero.  He states he is doing well, just feels stiff and had incisional concerns.  He is in PT now.  He has minimal pain down his leg.  He still feels weak but improving. He has some groin throbbing pain occasionally when sitting.

## 2024-02-08 NOTE — PATIENT INSTRUCTIONS
Refill policies:    Allow 2-3 business days for refills; controlled substances may take longer.  Contact your pharmacy at least 5 days prior to running out of medication and have them send an electronic request or submit request through the “request refill” option in your [employer] account.  Refills are not addressed on weekends; covering physicians do not authorize routine medications on weekends.  No narcotics or controlled substances are refilled after noon on Fridays or by on call physicians.  By law, narcotics must be electronically prescribed.  A 30 day supply with no refills is the maximum allowed.  If your prescription is due for a refill, you may be due for a follow up appointment.  To best provide you care, patients receiving routine medications need to be seen at least once a year.  Patients receiving narcotic/controlled substance medications need to be seen at least once every 3 months.  In the event that your preferred pharmacy does not have the requested medication in stock (e.g. Backordered), it is your responsibility to find another pharmacy that has the requested medication available.  We will gladly send a new prescription to that pharmacy at your request.    Scheduling Tests:    If your physician has ordered radiology tests such as MRI or CT scans, please contact Central Scheduling at [phone] right away to schedule the test.  Once scheduled, the [facility] Centralized Referral Team will work with your insurance carrier to obtain pre-certification or prior authorization.  Depending on your insurance carrier, approval may take 3-10 days.  It is highly recommended patients assure they have received an authorization before having a test performed.  If test is done without insurance authorization, patient may be responsible for the entire amount billed.      Precertification and Prior Authorizations:  If your physician has recommended that you have a procedure or additional testing performed the [facility]  Centralized Referral Team will contact your insurance carrier to obtain pre-certification or prior authorization.    You are strongly encouraged to contact your insurance carrier to verify that your procedure/test has been approved and is a COVERED benefit.  Although the [facility] Centralized Referral Team does its due diligence, the insurance carrier gives the disclaimer that \"Although the procedure is authorized, this does not guarantee payment.\"    Ultimately the patient is responsible for payment.   Thank you for your understanding in this matter.  Paperwork Completion:  If you require FMLA or disability paperwork for your recovery, please make sure to either drop it off or have it faxed to our office at [phone]. Be sure the form has your name and date of birth on it.  The form will be faxed to our Forms Department and they will complete it for you.  There is a 25$ fee for all forms that need to be filled out.  Please be aware there is a 10-14 day turnaround time.  You will need to sign a release of information (TED) form if your paperwork does not come with one.  You may call the Forms Department with any questions at [phone].  Their fax number is [phone].

## 2024-02-15 ENCOUNTER — APPOINTMENT (OUTPATIENT)
Dept: MRI IMAGING | Facility: HOSPITAL | Age: 39
End: 2024-02-15
Attending: EMERGENCY MEDICINE
Payer: COMMERCIAL

## 2024-02-15 ENCOUNTER — HOSPITAL ENCOUNTER (EMERGENCY)
Facility: HOSPITAL | Age: 39
Discharge: HOME | End: 2024-02-15
Attending: EMERGENCY MEDICINE
Payer: COMMERCIAL

## 2024-02-15 ENCOUNTER — TELEPHONE (OUTPATIENT)
Dept: SURGERY | Facility: CLINIC | Age: 39
End: 2024-02-15

## 2024-02-15 VITALS
TEMPERATURE: 98 F | HEART RATE: 75 BPM | OXYGEN SATURATION: 98 % | DIASTOLIC BLOOD PRESSURE: 81 MMHG | WEIGHT: 165 LBS | RESPIRATION RATE: 18 BRPM | BODY MASS INDEX: 25 KG/M2 | SYSTOLIC BLOOD PRESSURE: 120 MMHG

## 2024-02-15 DIAGNOSIS — M54.50 BACK PAIN, LUMBOSACRAL: Primary | ICD-10-CM

## 2024-02-15 PROCEDURE — A9575 INJ GADOTERATE MEGLUMI 0.1ML: HCPCS | Performed by: EMERGENCY MEDICINE

## 2024-02-15 PROCEDURE — 72158 MRI LUMBAR SPINE W/O & W/DYE: CPT | Performed by: EMERGENCY MEDICINE

## 2024-02-15 PROCEDURE — 99284 EMERGENCY DEPT VISIT MOD MDM: CPT

## 2024-02-15 RX ORDER — GADOTERATE MEGLUMINE 376.9 MG/ML
15 INJECTION INTRAVENOUS
Status: COMPLETED | OUTPATIENT
Start: 2024-02-15 | End: 2024-02-15

## 2024-02-15 RX ORDER — IBUPROFEN 600 MG/1
600 TABLET ORAL ONCE
Status: COMPLETED | OUTPATIENT
Start: 2024-02-15 | End: 2024-02-15

## 2024-02-15 RX ORDER — CYCLOBENZAPRINE HCL 10 MG
10 TABLET ORAL 3 TIMES DAILY PRN
Qty: 20 TABLET | Refills: 0 | Status: SHIPPED | OUTPATIENT
Start: 2024-02-15 | End: 2024-02-22

## 2024-02-15 NOTE — PROGRESS NOTES
Attempted to see the patient for NSGY consultation. Patient at MRI. Will attempt to see when patient returns from MRI. Formal NSGY consultation to follow.

## 2024-02-15 NOTE — TELEPHONE ENCOUNTER
Patient states that he has not received a call back from on call or staff. Patient states he is ready to go to the ER.     Transfer call to [facility].

## 2024-02-15 NOTE — ED QUICK NOTES
Pt A&OX4, pt denies dizziness/lightheadedness, cp, sob, n/v. Discharge paperwork, prescription, and follow-up discussed with pt, pt verbally understands them. Pt discharged ambulatory with steady gait.

## 2024-02-15 NOTE — ED PROVIDER NOTES
Patient Seen in: [facility] Emergency Department    History     Chief Complaint   Patient presents with    Back Pain       HPI    History is provided by patient/independent historian: Patient  38 year old male with history of recent cord compression with emergent laminectomy 13 weeks ago, here with complaints of worsening back pain after sneezing yesterday.  Aggravating factors are movement, alleviating factors are rest.  Once he is standing, he reports the pain being manageable.  No incontinence, but he does have a history of postop seroma that supposedly was getting larger since the sneeze.  No fevers.    History reviewed.   Past Medical History:   Diagnosis Date    Eye infection, right          History reviewed.   Past Surgical History:   Procedure Laterality Date    HAND/FINGER SURGERY UNLISTED  2008         Home Medications reviewed :  (Not in a hospital admission)        History reviewed.   Social History     Socioeconomic History    Marital status:    Tobacco Use    Smoking status: Every Day     Packs/day: .5     Types: Cigarettes    Smokeless tobacco: Never   Vaping Use    Vaping Use: Never used   Substance and Sexual Activity    Alcohol use: Yes    Drug use: Never         ROS  Review of Systems   Respiratory:  Negative for shortness of breath.    Cardiovascular:  Negative for chest pain.   Musculoskeletal:  Positive for back pain.   All other systems reviewed and are negative.     All other pertinent organ systems are reviewed and are negative.      Physical Exam     ED Triage Vitals   BP 02/15/24 1027 127/84   Pulse 02/15/24 1027 104   Resp 02/15/24 1027 18   Temp 02/15/24 1027 98 °F (36.7 °C)   Temp src --    SpO2 02/15/24 1027 98 %   O2 Device 02/15/24 1200 None (Room air)     Vital signs reviewed.      Physical Exam  Vitals and nursing note reviewed.   Cardiovascular:      Pulses: Normal pulses.   Pulmonary:      Effort: No respiratory distress.   Abdominal:      General: There is no  distension.   Musculoskeletal:      Comments: Lumbar incision well-healed with underlying swelling, ballotable, no overlying cellulitis   Neurological:      Mental Status: He is alert.      Comments: Ambulatory, 5 out of 5 strength in bilateral lower extremities, normal sensation to light touch in bilateral lower extremities         ED Course       Labs:   Labs Reviewed - No data to display      My EKG Interpretation:   As reviewed and Interpreted by me      Imaging Results Available and Reviewed while in ED:   MRI SPINE LUMBAR (W+WO) (CPT=72158)    Result Date: 2/15/2024  CONCLUSION:   Postoperative changes of a right L4 laminotomy with micro discectomy.  There is a 6.7 cm bilobed peripherally enhancing collection within the right posterior paraspinal soft tissues extending to the right L4 laminotomy.  Findings are suggestive of a postoperative seroma.  Continued follow-up imaging suggested to demonstrate  complete resolution.  No mass effect or intra canal extension.  Small right paracentral disc protrusion at L4-L5 results in mild narrowing of the canal and bilateral neural foramen.  This protrusion abuts but does not displace the descending right L5 nerve root.  No high-grade canal or foraminal narrowing.  Marrow edema and enhancement involving the right aspect of the L4-L5 vertebral body suggestive of reactive etiology from recent operation. This can also be assessed for resolution on subsequent follow-up imaging.     Dictated by (CST): Evens Machado MD on 2/15/2024 at 1:55 PM     Finalized by (CST): Evens Machado MD on 2/15/2024 at 2:05 PM         My review and independent interpretation of MRI images: no obvious cauda equina. Radiology report corroborates this in addition to other details as reported by them.      Decision rules/scores evaluated: none      Diagnostic labs/tests considered but not ordered: CBC, BMP, UA    ED Medications Administered:   Medications   ibuprofen (Motrin) tab 600 mg (600 mg Oral  Given 2/15/24 1208)   gadoterate meglumine (Dotarem) 7.5 MMOL/15ML injection 15 mL (15 mL Intravenous Given 2/15/24 1329)                MDM       Medical Decision Making      Differential Diagnosis: After obtaining the patient's history, performing the physical exam and reviewing the diagnostics, multiple initial diagnoses were considered based on the presenting problem including postop seroma, cord compression, sciatica, back strain    External document review: I personally reviewed available external medical records for any recent pertinent discharge summaries, testing, and procedures - the findings are as follows: 11/8/23 admission for cauda equina    Complicating Factors: The patient already  has a past medical history of Eye infection, right. to contribute to the complexity of this ED evaluation.    Procedures performed: none    Discussed management with physician/appropriate source: Dr. Dinero - requesting MRI    Considered admission/deescalation of care for: back pain    Social determinants of health affecting patient care: none    Prescription medications considered: flexeril, discussed continuing current medication regimen    The patient requires continuous monitoring for: back pain    Shared decision making: discussed possible admission        Disposition and Plan     Clinical Impression:  1. Back pain, lumbosacral        Disposition:  Discharge    Follow-up:  Virgil Dinero MD  [facility] [address] [id number] [phone]    Follow up        Medications Prescribed:  Current Discharge Medication List        START taking these medications    Details   cyclobenzaprine 10 MG Oral Tab Take 1 tablet (10 mg total) by mouth 3 (three) times daily as needed for Muscle spasms.  Qty: 20 tablet, Refills: 0

## 2024-02-15 NOTE — TELEPHONE ENCOUNTER
Patient has a spinal fluid leak, seroma,     With position changes patient has pain up to a 12. Patient spoke to Benigno BEDOYA earlier and was going to go to the ED. Patient still wanting to know if we have heard back from the doctor. Advised patient that we have not heard back yet. Advised patient with pain level he is having he needs to be in the ED to be evaluated and get his pain controled. Patient acknowledged and was going to go to the ED now.

## 2024-02-15 NOTE — ED INITIAL ASSESSMENT (HPI)
Patient complains back pain, hx of laminectomy 13 weeks ago, states he may have exacerbated it yesterday after sneezing

## 2024-02-15 NOTE — TELEPHONE ENCOUNTER
Noted the patient message listed below.    Noted pt underwent L4-5 LUMBAR LAMINECTOMY, RESECTION OF DISC WITH MICROSCOPE with Dr. Dinero on 11/8/2023     Noted the patient LOV on 2/8/2024:  \"Assessment/Plan:  Our patient most likely has a delayed cerebrospinal fluid leak associated with his pathology at L4-5.  Alternatively, this could represent a seroma that has escaped from the subfascial to the suprafas thatcial compartment.  Either way, I discussed with him that as long as there is no leakage outside, this should heal over time and the bulge should received.  We will have him continue physical therapy and we will follow-up in a month to reassess the evolution.     More than 30 minutes were spent during this visit and the coordination of this patient's care. All questions and concerns were addressed. We appreciate the opportunity to participate in the care of this patient. Please do not hesitate to call our office ([phone]) with any issues.     This note has been dictated utilizing voice recognition software. Unfortunately, this may lead to occasional typographical errors. If there are any questions regarding this, please do not hesitate to contact our office. \"      Called the patient to inquire further.      -pt reports pain and a buldge on his incision site. Reports that at his LOV physician suspected a Spinal fluid leak seroma.   MD recommended the patient the following at LOV\"  -HA and pain to call in   -Recommended rest and heat and that it should resolve on its own   Pt states he is engaging in PT     Last 2 days the lump  is not increasing and   Pt states it hurts to walk and pain level is constantly a 2   Sitting to standing sharp pains on the right side and pain level is 5/10 pain     Laying to sitting - sit up position and the pt is not able to do it and pain is 10/10 pain and the patient needs to roll out of bed     Pain in the thigh on the right side deep aching pain almost restless leg or nerve  pain. Pt states its the lumbar ligaments and something is being pinched.     Pt denies loss of b/b control.  Pt states he is weaker on the right side now.     Pt tried a muslce relaxer last night without relief.  Pt is not taking norco. Pt states he would like to find the cause of the problem rather than masking with medications.    Pt states he needs imaging. And can tell in the last 24 hours it is not getting better. Pt states he is likely to go to the ED. Reviewed a red flag s/s  pt acknowledged. Pt states he will go to the ED after hearing recommendations     Pt reports Pulsating pains in the groin pelvic area that has worsened in the last 24 hours.     Nursing acknowledged. Informed pt that condition update will be endorsed to the providers for review and feedback     Pt is appreciative call was ended.     Routed to the provider for recommendations

## 2024-02-15 NOTE — TELEPHONE ENCOUNTER
Patient states he called this morning at 1:30am  on call and did not receive a call back. He states he is having complications from his surgery 13weeks ago.

## 2024-02-19 ENCOUNTER — HOSPITAL ENCOUNTER (EMERGENCY)
Facility: HOSPITAL | Age: 39
Discharge: HOME | End: 2024-02-19
Attending: EMERGENCY MEDICINE
Payer: COMMERCIAL

## 2024-02-19 VITALS
WEIGHT: 165 LBS | OXYGEN SATURATION: 98 % | SYSTOLIC BLOOD PRESSURE: 126 MMHG | HEART RATE: 75 BPM | RESPIRATION RATE: 16 BRPM | TEMPERATURE: 97 F | BODY MASS INDEX: 25 KG/M2 | DIASTOLIC BLOOD PRESSURE: 81 MMHG

## 2024-02-19 DIAGNOSIS — M62.830 LUMBAR PARASPINAL MUSCLE SPASM: Primary | ICD-10-CM

## 2024-02-19 PROCEDURE — 99283 EMERGENCY DEPT VISIT LOW MDM: CPT

## 2024-02-19 PROCEDURE — 99284 EMERGENCY DEPT VISIT MOD MDM: CPT

## 2024-02-19 RX ORDER — NAPROXEN 500 MG/1
500 TABLET ORAL 2 TIMES DAILY PRN
Qty: 28 TABLET | Refills: 0 | Status: SHIPPED | OUTPATIENT
Start: 2024-02-19 | End: 2024-03-04

## 2024-02-19 RX ORDER — ACETAMINOPHEN 500 MG
1000 TABLET ORAL ONCE
Status: COMPLETED | OUTPATIENT
Start: 2024-02-19 | End: 2024-02-19

## 2024-02-19 RX ORDER — LIDOCAINE 50 MG/G
1 PATCH TOPICAL EVERY 24 HOURS
Qty: 14 PATCH | Refills: 0 | Status: SHIPPED | OUTPATIENT
Start: 2024-02-19 | End: 2024-03-04

## 2024-02-19 RX ORDER — ACETAMINOPHEN 500 MG
1000 TABLET ORAL EVERY 6 HOURS PRN
Qty: 56 TABLET | Refills: 0 | Status: SHIPPED | OUTPATIENT
Start: 2024-02-19 | End: 2024-03-04

## 2024-02-19 RX ORDER — OXYCODONE HYDROCHLORIDE 5 MG/1
5 TABLET ORAL ONCE
Status: COMPLETED | OUTPATIENT
Start: 2024-02-19 | End: 2024-02-19

## 2024-02-19 RX ORDER — NAPROXEN 500 MG/1
500 TABLET ORAL ONCE
Status: COMPLETED | OUTPATIENT
Start: 2024-02-19 | End: 2024-02-19

## 2024-02-19 NOTE — TELEPHONE ENCOUNTER
Patient wife called stating he is in a lot of pain and can't move his leg, does not know what else to give him he has taken 2 muscle relaxers.

## 2024-02-19 NOTE — ED PROVIDER NOTES
Patient Seen in: [facility] Emergency Department      History     Chief Complaint   Patient presents with    Back Pain     Stated Complaint: Back pain    Subjective:   HPI        Objective:   Past Medical History:   Diagnosis Date    Eye infection, right               Past Surgical History:   Procedure Laterality Date    HAND/FINGER SURGERY UNLISTED  2008                Social History     Socioeconomic History    Marital status:    Tobacco Use    Smoking status: Every Day     Packs/day: .5     Types: Cigarettes    Smokeless tobacco: Never   Vaping Use    Vaping Use: Never used   Substance and Sexual Activity    Alcohol use: Yes    Drug use: Never     Social Determinants of Health     Food Insecurity: No Food Insecurity (11/8/2023)    Food Insecurity     Food Insecurity: Never true   Transportation Needs: No Transportation Needs (11/8/2023)    Transportation Needs     Lack of Transportation: No   Housing Stability: Low Risk  (11/8/2023)    Housing Stability     Housing Instability: No              Review of Systems    Positive for stated complaint: Back pain  Other systems are as noted in HPI.  Constitutional and vital signs reviewed.      All other systems reviewed and negative except as noted above.    Physical Exam     ED Triage Vitals [02/19/24 1149]   /81   Pulse 80   Resp 18   Temp 97.3 °F (36.3 °C)   Temp src Temporal   SpO2 97 %   O2 Device None (Room air)       Current:/81   Pulse 75   Temp 97.3 °F (36.3 °C) (Temporal)   Resp 16   Wt 74.8 kg   SpO2 98%   BMI 25.09 kg/m²         Physical Exam          ED Course   Labs Reviewed - No data to display                   MDM      38-year-old male with a history of an emergent laminectomy performed 13 weeks ago for L4-5 spinal compression presents today with low back pain.  Of note, patient was seen in the ED 4 days ago for back pain after sneezing.  MRI performed at that time showed an old postsurgical seroma but no acute other  findings.  Patient states that he has been on his feet a lot and in chairs a lot over the last several days which has exacerbated pain in the right sacroiliac joint and right lower back.  Denies midline pain, fevers, lower extremity numbness/weakness, urinary retention, or other new symptoms.  Has been taking Flexeril at home with limited relief.    On exam, vitals normal, well-appearing, strength and sensation intact in the lower extremities, no midline tenderness, some tenderness overlying the sacroiliac joint and right lower paraspinal muscles.  Palpable seroma baseline per patient.    Differential: Muscular strain versus spasm, disc herniation, low concern for spinal cord compression or emergent postsurgical complication    Patient treated in the ED with oxycodone, acetaminophen, naproxen, and a lidocaine patch.  He was advised on analgesia at home, neurosurgery follow-up, and given careful return precautions.                                   MDM    Disposition and Plan     Clinical Impression:  1. Lumbar paraspinal muscle spasm         Disposition:  Discharge  2/19/2024 12:21 pm    Follow-up:  Virgil Dinero MD  [facility] [address] [address]  [phone]    Follow up  As needed          Medications Prescribed:  Discharge Medication List as of 2/19/2024 12:43 PM        START taking these medications    Details   acetaminophen 500 MG Oral Tab Take 2 tablets (1,000 mg total) by mouth every 6 (six) hours as needed for Pain., Normal, Disp-56 tablet, R-0      naproxen 500 MG Oral Tab Take 1 tablet (500 mg total) by mouth 2 (two) times daily as needed., Normal, Disp-28 tablet, R-0      lidocaine 5 % External Patch Place 1 patch onto the skin daily for 14 days., Normal, Disp-14 patch, R-0

## 2024-02-19 NOTE — ED INITIAL ASSESSMENT (HPI)
Patient complains of lower back pain radiating to right lower leg, history of surg to L4L5, aggravated this am. Minimal relief from cyclobenzaprine

## 2024-02-26 ENCOUNTER — TELEPHONE (OUTPATIENT)
Dept: SURGERY | Facility: CLINIC | Age: 39
End: 2024-02-26

## 2024-02-27 ENCOUNTER — OFFICE VISIT (OUTPATIENT)
Dept: SURGERY | Facility: CLINIC | Age: 39
End: 2024-02-27
Payer: COMMERCIAL

## 2024-02-27 ENCOUNTER — HOSPITAL ENCOUNTER (INPATIENT)
Facility: HOSPITAL | Age: 39
LOS: 2 days | Discharge: HOME | DRG: 520 | End: 2024-02-29
Attending: EMERGENCY MEDICINE | Admitting: HOSPITALIST
Payer: COMMERCIAL

## 2024-02-27 ENCOUNTER — APPOINTMENT (OUTPATIENT)
Dept: MRI IMAGING | Facility: HOSPITAL | Age: 39
End: 2024-02-27
Attending: EMERGENCY MEDICINE
Payer: COMMERCIAL

## 2024-02-27 ENCOUNTER — APPOINTMENT (OUTPATIENT)
Dept: MRI IMAGING | Facility: HOSPITAL | Age: 39
DRG: 520 | End: 2024-02-27
Attending: EMERGENCY MEDICINE
Payer: COMMERCIAL

## 2024-02-27 ENCOUNTER — HOSPITAL ENCOUNTER (INPATIENT)
Facility: HOSPITAL | Age: 39
LOS: 2 days | Discharge: HOME | End: 2024-02-29
Attending: EMERGENCY MEDICINE | Admitting: HOSPITALIST
Payer: COMMERCIAL

## 2024-02-27 VITALS
HEIGHT: 68 IN | WEIGHT: 165 LBS | DIASTOLIC BLOOD PRESSURE: 79 MMHG | SYSTOLIC BLOOD PRESSURE: 126 MMHG | HEART RATE: 91 BPM | BODY MASS INDEX: 25.01 KG/M2

## 2024-02-27 DIAGNOSIS — M47.26 OTHER SPONDYLOSIS WITH RADICULOPATHY, LUMBAR REGION: Primary | ICD-10-CM

## 2024-02-27 DIAGNOSIS — M51.26 LUMBAR DISC HERNIATION: ICD-10-CM

## 2024-02-27 DIAGNOSIS — Z98.890 S/P LUMBAR MICRODISCECTOMY: Primary | ICD-10-CM

## 2024-02-27 DIAGNOSIS — Z98.890 STATUS POST LAMINECTOMY: ICD-10-CM

## 2024-02-27 LAB
ANION GAP SERPL CALC-SCNC: 8 MMOL/L (ref 0–18)
BASOPHILS # BLD AUTO: 0.06 X10(3) UL (ref 0–0.2)
BASOPHILS NFR BLD AUTO: 0.5 %
BUN BLD-MCNC: 17 MG/DL (ref 9–23)
BUN/CREAT SERPL: 17.3 (ref 10–20)
CALCIUM BLD-MCNC: 10.3 MG/DL (ref 8.7–10.4)
CHLORIDE SERPL-SCNC: 103 MMOL/L (ref 98–112)
CO2 SERPL-SCNC: 27 MMOL/L (ref 21–32)
CREAT BLD-MCNC: 0.98 MG/DL
DEPRECATED RDW RBC AUTO: 39.9 FL (ref 35.1–46.3)
EGFRCR SERPLBLD CKD-EPI 2021: 100 ML/MIN/1.73M2 (ref 60–?)
EOSINOPHIL # BLD AUTO: 0.14 X10(3) UL (ref 0–0.7)
EOSINOPHIL NFR BLD AUTO: 1.2 %
ERYTHROCYTE [DISTWIDTH] IN BLOOD BY AUTOMATED COUNT: 11.9 % (ref 11–15)
GLUCOSE BLD-MCNC: 78 MG/DL (ref 70–99)
HCT VFR BLD AUTO: 45.7 %
HGB BLD-MCNC: 16.3 G/DL
IMM GRANULOCYTES # BLD AUTO: 0.06 X10(3) UL (ref 0–1)
IMM GRANULOCYTES NFR BLD: 0.5 %
LYMPHOCYTES # BLD AUTO: 1.72 X10(3) UL (ref 1–4)
LYMPHOCYTES NFR BLD AUTO: 14.8 %
MCH RBC QN AUTO: 32.3 PG (ref 26–34)
MCHC RBC AUTO-ENTMCNC: 35.7 G/DL (ref 31–37)
MCV RBC AUTO: 90.7 FL
MONOCYTES # BLD AUTO: 0.72 X10(3) UL (ref 0.1–1)
MONOCYTES NFR BLD AUTO: 6.2 %
NEUTROPHILS # BLD AUTO: 8.89 X10 (3) UL (ref 1.5–7.7)
NEUTROPHILS # BLD AUTO: 8.89 X10(3) UL (ref 1.5–7.7)
NEUTROPHILS NFR BLD AUTO: 76.8 %
OSMOLALITY SERPL CALC.SUM OF ELEC: 286 MOSM/KG (ref 275–295)
PLATELET # BLD AUTO: 225 10(3)UL (ref 150–450)
POTASSIUM SERPL-SCNC: 4 MMOL/L (ref 3.5–5.1)
RBC # BLD AUTO: 5.04 X10(6)UL
SODIUM SERPL-SCNC: 138 MMOL/L (ref 136–145)
WBC # BLD AUTO: 11.6 X10(3) UL (ref 4–11)

## 2024-02-27 PROCEDURE — 72148 MRI LUMBAR SPINE W/O DYE: CPT | Performed by: EMERGENCY MEDICINE

## 2024-02-27 PROCEDURE — 99222 1ST HOSP IP/OBS MODERATE 55: CPT | Performed by: HOSPITALIST

## 2024-02-27 PROCEDURE — 3008F BODY MASS INDEX DOCD: CPT

## 2024-02-27 PROCEDURE — 3078F DIAST BP <80 MM HG: CPT

## 2024-02-27 PROCEDURE — 99214 OFFICE O/P EST MOD 30 MIN: CPT

## 2024-02-27 PROCEDURE — 3074F SYST BP LT 130 MM HG: CPT

## 2024-02-27 RX ORDER — HYDROCODONE BITARTRATE AND ACETAMINOPHEN 5; 325 MG/1; MG/1
1 TABLET ORAL EVERY 4 HOURS PRN
Status: DISCONTINUED | OUTPATIENT
Start: 2024-02-27 | End: 2024-02-29

## 2024-02-27 RX ORDER — AMOXICILLIN 500 MG/1
TABLET, FILM COATED ORAL
COMMUNITY

## 2024-02-27 RX ORDER — MORPHINE SULFATE 2 MG/ML
2 INJECTION, SOLUTION INTRAMUSCULAR; INTRAVENOUS EVERY 2 HOUR PRN
Status: DISCONTINUED | OUTPATIENT
Start: 2024-02-27 | End: 2024-02-29

## 2024-02-27 RX ORDER — RIZATRIPTAN BENZOATE 10 MG/1
TABLET, ORALLY DISINTEGRATING ORAL
COMMUNITY

## 2024-02-27 RX ORDER — VENLAFAXINE HYDROCHLORIDE 37.5 MG/1
1 CAPSULE, EXTENDED RELEASE ORAL DAILY
COMMUNITY

## 2024-02-27 RX ORDER — PREDNISONE 10 MG/1
TABLET ORAL
COMMUNITY
End: 2024-02-29

## 2024-02-27 RX ORDER — MORPHINE SULFATE 4 MG/ML
4 INJECTION, SOLUTION INTRAMUSCULAR; INTRAVENOUS EVERY 2 HOUR PRN
Status: DISCONTINUED | OUTPATIENT
Start: 2024-02-27 | End: 2024-02-29

## 2024-02-27 RX ORDER — CYCLOBENZAPRINE HCL 10 MG
1 TABLET ORAL 3 TIMES DAILY PRN
COMMUNITY
End: 2024-02-29

## 2024-02-27 RX ORDER — ACETAMINOPHEN 325 MG/1
650 TABLET ORAL EVERY 4 HOURS PRN
Status: DISCONTINUED | OUTPATIENT
Start: 2024-02-27 | End: 2024-02-29

## 2024-02-27 RX ORDER — BUPROPION HYDROCHLORIDE 150 MG/1
300 TABLET ORAL
Status: DISCONTINUED | OUTPATIENT
Start: 2024-02-28 | End: 2024-02-29

## 2024-02-27 RX ORDER — ALPRAZOLAM 0.25 MG/1
0.25 TABLET ORAL 2 TIMES DAILY PRN
Status: DISCONTINUED | OUTPATIENT
Start: 2024-02-27 | End: 2024-02-29

## 2024-02-27 RX ORDER — VILAZODONE HYDROCHLORIDE 10 MG/1
1 TABLET ORAL
COMMUNITY

## 2024-02-27 RX ORDER — GABAPENTIN 100 MG/1
100 CAPSULE ORAL 3 TIMES DAILY
Status: DISCONTINUED | OUTPATIENT
Start: 2024-02-27 | End: 2024-02-29

## 2024-02-27 RX ORDER — CYCLOBENZAPRINE HCL 10 MG
10 TABLET ORAL 3 TIMES DAILY PRN
Status: DISCONTINUED | OUTPATIENT
Start: 2024-02-27 | End: 2024-02-29

## 2024-02-27 RX ORDER — HYDROCODONE BITARTRATE AND ACETAMINOPHEN 5; 325 MG/1; MG/1
1 TABLET ORAL ONCE
Status: COMPLETED | OUTPATIENT
Start: 2024-02-27 | End: 2024-02-27

## 2024-02-27 RX ORDER — PROCHLORPERAZINE EDISYLATE 5 MG/ML
5 INJECTION INTRAMUSCULAR; INTRAVENOUS EVERY 8 HOURS PRN
Status: DISCONTINUED | OUTPATIENT
Start: 2024-02-27 | End: 2024-02-29

## 2024-02-27 RX ORDER — HYDROCODONE BITARTRATE AND ACETAMINOPHEN 5; 325 MG/1; MG/1
2 TABLET ORAL EVERY 4 HOURS PRN
Status: DISCONTINUED | OUTPATIENT
Start: 2024-02-27 | End: 2024-02-29

## 2024-02-27 RX ORDER — CELECOXIB 100 MG/1
1 CAPSULE ORAL 2 TIMES DAILY
COMMUNITY
End: 2024-02-29

## 2024-02-27 RX ORDER — ONDANSETRON 2 MG/ML
4 INJECTION INTRAMUSCULAR; INTRAVENOUS EVERY 6 HOURS PRN
Status: DISCONTINUED | OUTPATIENT
Start: 2024-02-27 | End: 2024-02-29

## 2024-02-27 RX ORDER — TEMAZEPAM 15 MG/1
15 CAPSULE ORAL NIGHTLY PRN
Status: DISCONTINUED | OUTPATIENT
Start: 2024-02-27 | End: 2024-02-29

## 2024-02-27 RX ORDER — IBUPROFEN 600 MG/1
1 TABLET ORAL EVERY 6 HOURS PRN
COMMUNITY
End: 2024-02-29

## 2024-02-27 RX ORDER — ACETAMINOPHEN 500 MG
500 TABLET ORAL EVERY 4 HOURS PRN
Status: DISCONTINUED | OUTPATIENT
Start: 2024-02-27 | End: 2024-02-29

## 2024-02-27 RX ORDER — CYCLOBENZAPRINE HCL 5 MG
TABLET ORAL
COMMUNITY
End: 2024-02-29

## 2024-02-27 RX ORDER — HYDROCODONE BITARTRATE AND ACETAMINOPHEN 5; 325 MG/1; MG/1
1 TABLET ORAL EVERY 4 HOURS PRN
COMMUNITY
End: 2024-02-29

## 2024-02-27 RX ORDER — ALPRAZOLAM 0.25 MG/1
1 TABLET ORAL 2 TIMES DAILY PRN
COMMUNITY

## 2024-02-27 RX ORDER — KETOROLAC TROMETHAMINE 30 MG/ML
30 INJECTION, SOLUTION INTRAMUSCULAR; INTRAVENOUS ONCE
Status: COMPLETED | OUTPATIENT
Start: 2024-02-27 | End: 2024-02-27

## 2024-02-27 RX ORDER — MORPHINE SULFATE 2 MG/ML
1 INJECTION, SOLUTION INTRAMUSCULAR; INTRAVENOUS EVERY 2 HOUR PRN
Status: DISCONTINUED | OUTPATIENT
Start: 2024-02-27 | End: 2024-02-29

## 2024-02-27 NOTE — ED QUICK NOTES
Orders for admission, patient is aware of plan and ready to go upstairs. Any questions, please call ED RN Lida at extension [phone].     Patient Covid vaccination status: Unvaccinated     COVID Test Ordered in ED: None    COVID Suspicion at Admission: N/A    Running Infusions:  None    Mental Status/LOC at time of transport: Aox4    Other pertinent information:   CIWA score: N/A   NIH score:  N/A

## 2024-02-27 NOTE — PROGRESS NOTES
JOSIAH COYLE M.D., F.A.A.N.S.     of Neurosurgery  [facility]  Board Certified Neurosurgeon                              [address]  [address] [facility]  [place] [address]    PHONE  [phone]          FAX  [phone]    [email or web address]      OFFICE FOLLOW-UP NOTE            Byron Garcia    : 1985    MRN: [id number]  CSN: [id number]      PCP: RAJIV CALVILLO  Referring Provider: No ref. provider found    Insurance: Payor: [facility] PPO / Plan: BCBS PPO / Product Type: PPO /           Date of Visit: 2024    Reason for Visit:   Chief Complaint    Follow - Up                         History of Present Care:  Byron Garcia is a a(n) 38 year old, male.  However patient is 3-1/2 months status post lumbar decompression with severe reoccurrence of right lower extremity pain that has caused several returns to the ER.  In fact, he is 10 out of 10 pain today.  He reports that after surgery he was doing really well for several months and then after a sneezing episode the pain reoccurred.  It radiates down the right lower extremity to the top of the foot and he has difficulty getting around.  He is quite disabled because of this and wishes to get this addressed.      History:  .  Past Medical History:   Diagnosis Date    Eye infection, right       Past Surgical History:   Procedure Laterality Date    HAND/FINGER SURGERY UNLISTED        Family History   Problem Relation Age of Onset    Cancer Father 62        Bladder      Social History     Socioeconomic History    Marital status:      Spouse name: Not on file    Number of children: Not on file    Years of education: Not on file    Highest education level: Not on file   Occupational History    Not on file   Tobacco Use    Smoking status: Every Day     Packs/day: .5     Types: Cigarettes    Smokeless  tobacco: Never   Vaping Use    Vaping Use: Never used   Substance and Sexual Activity    Alcohol use: Yes    Drug use: Never    Sexual activity: Not on file   Other Topics Concern    Not on file   Social History Narrative    Not on file     Social Determinants of Health     Financial Resource Strain: Not on file   Food Insecurity: No Food Insecurity (11/8/2023)    Food Insecurity     Food Insecurity: Never true   Transportation Needs: No Transportation Needs (11/8/2023)    Transportation Needs     Lack of Transportation: No   Physical Activity: Not on file   Stress: Not on file   Social Connections: Not on file   Housing Stability: Low Risk  (11/8/2023)    Housing Stability     Housing Instability: No     Housing Instability Emergency: Not on file        Allergies:  No Known Allergies      Medications:  Current Outpatient Medications   Medication Sig Dispense Refill    ALPRAZolam 0.25 MG Oral Tab Take 1 tablet (0.25 mg total) by mouth 2 (two) times daily as needed.      amoxicillin 500 MG Oral Tab       celecoxib 100 MG Oral Cap Take 1 capsule (100 mg total) by mouth 2 (two) times daily.      cyclobenzaprine 10 MG Oral Tab Take 1 tablet (10 mg total) by mouth 3 (three) times daily as needed.      cyclobenzaprine 5 MG Oral Tab       HYDROcodone-acetaminophen 5-325 MG Oral Tab Take 1 tablet by mouth every 4 (four) hours as needed.      ibuprofen 600 MG Oral Tab Take 1 tablet (600 mg total) by mouth every 6 (six) hours as needed.      predniSONE 10 MG Oral Tab TAKE 3 TABLETS BY MOUTH TWICE DAILY FOR 2 DAYS THEN TAKE 2 TABLETS BY MOUTH TWICE DAILY FOR 2 DAYS THEN TAKE 1 TABLET BY MOUTH TWICE DAILY FOR 2 DAYS      rizatriptan 10 MG Oral Tablet Dispersible TAKE 1 TABLET BY MOUTH ONCE AS NEEDED FOR FOR UP TO 1 DOSE. MAY REPEAT AFTER 1 HOUR      venlafaxine ER 37.5 MG Oral Capsule SR 24 Hr Take 1 capsule (37.5 mg total) by mouth daily.      vilazodone 10 MG Oral Tab Take 1 tablet (10 mg total) by mouth daily with breakfast.       acetaminophen 500 MG Oral Tab Take 2 tablets (1,000 mg total) by mouth every 6 (six) hours as needed for Pain. 56 tablet 0    naproxen 500 MG Oral Tab Take 1 tablet (500 mg total) by mouth 2 (two) times daily as needed. 28 tablet 0    buPROPion  MG Oral Tablet 24 Hr Take 1 tablet (300 mg total) by mouth before breakfast.          Review of Systems:  A 10-point system was reviewed.  Pertinent positives and negatives are noted in HPI.      Physical Exam:  /79 (BP Location: Left arm, Patient Position: Sitting, Cuff Size: adult)   Pulse 91   Ht 68\"   Wt 165 lb (74.8 kg)   BMI 25.09 kg/m²         Neurological Exam:    AAOx3, following commands  PERRLA  EOMI  Face symmetrical  Tongue midline  Hearing symmetrical and intact to finger rub    No rhinorrhea or otorrhea    Romberg negative    Motor Strength:  Plus 4 out of 5 weakness in the right lower extremity, knee extension, dorsiflexion and EHL    Sensation to light touch:  Symmetrical to light touch    Incision:  Clean dry and intact, with reduction of seroma.      Abdomen:  Soft, non-distended, non-tender, with no rebound or guarding.  No peritoneal signs.     Extremities:  Non-tender, no lower extremity edema noted.      Labs:  CBC:  Lab Results   Component Value Date    WBC 7.5 11/08/2023    HGB 15.8 11/08/2023    HCT 45.0 11/08/2023    MCV 93.6 11/08/2023    .0 11/08/2023      BMG:  Lab Results   Component Value Date     11/08/2023    K 4.0 11/08/2023    CO2 29.0 11/08/2023     11/08/2023    BUN 10 11/08/2023      INR:  No results found for: \"INR\", \"PROTIME\"       Diagnostics:  No new imaging to review    Diagnosis:  1. Status post laminectomy    2. Other spondylosis with radiculopathy, lumbar region      Assessment/Plan:  Our patient is here with a severe right L5 radiculopathy, clinically speaking.  It has been getting worse and he has difficulty getting around.  I have instructed him to go to the emergency room from my  clinic to get an expedited MRI evaluation and get admitted for pain control.  We will evaluate him on an inpatient basis and follow-up accordingly.    More than 30 minutes were spent during this visit and the coordination of this patient's care. All questions and concerns were addressed. We appreciate the opportunity to participate in the care of this patient. Please do not hesitate to call our office ([phone]) with any issues.    This note has been dictated utilizing voice recognition software. Unfortunately, this may lead to occasional typographical errors. If there are any questions regarding this, please do not hesitate to contact our office.             Virgil Dinero M.D., F.A.A.N.S.    2/27/2024  9:40 AM

## 2024-02-27 NOTE — ED PROVIDER NOTES
Patient Seen in: [facility] Emergency Department    History     Chief Complaint   Patient presents with    Back Pain       HPI    38-year-old male with chronic low back with multiple ED presentations during the last 2 weeks who reports chronic worsening low back pain, denies bowel or bladder incontinence or any focal weakness or numbness or paresthesias.  No fevers.    History reviewed.   Past Medical History:   Diagnosis Date    Eye infection, right        History reviewed.   Past Surgical History:   Procedure Laterality Date    HAND/FINGER SURGERY UNLISTED  2008         Medications :  (Not in a hospital admission)       Family History   Problem Relation Age of Onset    Cancer Father 62        Bladder       Smoking Status:   Social History     Socioeconomic History    Marital status:    Tobacco Use    Smoking status: Every Day     Packs/day: .5     Types: Cigarettes    Smokeless tobacco: Never   Vaping Use    Vaping Use: Never used   Substance and Sexual Activity    Alcohol use: Yes    Drug use: Never       Constitutional and vital signs reviewed.      Social History and Family History elements reviewed from today, pertinent positives to the presenting problem noted.    Physical Exam     ED Triage Vitals   BP 02/27/24 1029 (!) 135/96   Pulse 02/27/24 1029 94   Resp 02/27/24 1029 18   Temp 02/27/24 1029 97.8 °F (36.6 °C)   Temp src 02/27/24 1029 Temporal   SpO2 02/27/24 1207 99 %   O2 Device 02/27/24 1207 None (Room air)       All measures to prevent infection transmission during my interaction with the patient were taken. The patient was already wearing a droplet mask on my arrival to the room. Personal protective equipment was worn throughout the duration of the exam.  Handwashing was performed prior to and after the exam.  Stethoscope and any equipment used during my examination was cleaned with super sani-cloth germicidal wipes following the exam.     Physical Exam    General: NAD  Head:  Normocephalic and atraumatic.  Mouth/Throat/Ears/Nose: No hoarseness of voice  Eyes: Conjunctivae and EOM are normal.  Neck: Normal range of motion. Supple.   Cardiovascular: Normal rate  Respiratory/Chest: No tachypnea.  Gastrointestinal: Nondistended  Musculoskeletal:No swelling or deformity.   Back: Bilateral hip flexion, knee extension, plantarflexion 5 out of 5 strength.  Bilateral sensation intact to light touch throughout the lower extremities.  No midline lumbar ttp.  There is right paraspinal lumbar tenderness.      Neurological: Alert and appropriate.   Skin: Skin is warm and dry. No pallor.  Psychiatric: Has a normal mood and affect.      ED Course        Labs Reviewed   BASIC METABOLIC PANEL (8)   CBC WITH DIFFERENTIAL WITH PLATELET    Narrative:     The following orders were created for panel order CBC With Differential With Platelet.                  Procedure                               Abnormality         Status                                     ---------                               -----------         ------                                     CBC W/ DIFFERENTIAL[[id number]]                                                                                           Please view results for these tests on the individual orders.   CBC W/ DIFFERENTIAL       As Interpreted by me    Imaging Results Available and Reviewed while in ED: MRI SPINE LUMBAR (CPT=72148)    Result Date: 2/27/2024  CONCLUSION:   1. L4-L5:  Postoperative changes of right L4-L5 hemilaminectomy and discectomy are again identified.  Small residual or recurrent right subarticular zone disc protrusion versus granulation tissue, which results in mild-to-moderate right lateral recess stenosis and slight effacement of the traversing right L5 nerve root.  This finding is very similar in comparison to comparison postoperative lumbar MR from 12 days prior.  Stable additional mild spinal canal and mild right neural foraminal stenosis at this  level.  2. There is a 3.5 x 2.2 cm fluid collection within the right paramedian posterior soft tissues at L4-L5, which has decreased in size since prior.  This likely relates to a resolving postoperative seroma, but superinfection cannot be excluded. 3. Edema at the right L4-L5 endplates, right L5 pedicle, and right greater than left posterior paraspinous musculature at L4-L5.  Differential considerations include sequelae of prior operative intervention, degenerative stress response, or altered biomechanics. 4. Remaining lumbar levels do not demonstrate significant degenerative changes or neural compromise. 5. Lesser incidental findings as above.   elm-remote  Dictated by (CST): Ousmane Hernandez MD on 2/27/2024 at 2:05 PM     Finalized by (CST): Ousmane Hernandez MD on 2/27/2024 at 2:13 PM         ED Medications Administered:   Medications   ketorolac (Toradol) 30 MG/ML injection 30 mg (30 mg Intramuscular Given 2/27/24 1216)   HYDROcodone-acetaminophen (Norco) 5-325 MG per tab 1 tablet (1 tablet Oral Given 2/27/24 1216)         MDM     Vitals:    02/27/24 1029 02/27/24 1207   BP: (!) 135/96 (!) 131/92   Pulse: 94 90   Resp: 18 16   Temp: 97.8 °F (36.6 °C)    TempSrc: Temporal    SpO2:  99%   Weight: 74.8 kg    Height: 172.7 cm (5' 8\")      *I personally reviewed and interpreted all ED vitals.    Pulse Ox: 99%, Room air, Normal       Medical Decision Making      Differential Diagnosis/ Diagnostic Considerations: Cord compression, cauda equina, epidural abscess, severe radiculopathy    Complicating Factors: The patient already has does not have any pertinent problems on file. to contribute to the complexity of this ED evaluation.    I reviewed prior chart records including telephone communication note prompting referral to the emergency department.  Patient here without clinical evidence of cord compression cauda equina or epidural abscess, MRI with disc herniation and after discussion with , will plan for  admission for operative intervention tomorrow, pain controlled with meds in the ED. labs ordered at time of admission.  Admitted to and discussed with .     Admitted in stable condition.  Patient and wife are comfortable with the plan.      Disposition and Plan     Clinical Impression:  1. Lumbar disc herniation        Disposition:  Admit    Follow-up:  No follow-up provider specified.    Medications Prescribed:  Current Discharge Medication List          Hospital Problems       Present on Admission  Date Reviewed: 2/27/2024            ICD-10-CM Noted POA    Lumbar disc herniation M51.26 2/27/2024 Unknown                   Detail Level: Detailed Quality 130: Documentation Of Current Medications In The Medical Record: Current Medications Documented Quality 431: Preventive Care And Screening: Unhealthy Alcohol Use - Screening: Patient screened for unhealthy alcohol use using a single question and scores less than 2 times per year Quality 226: Preventive Care And Screening: Tobacco Use: Screening And Cessation Intervention: Patient screened for tobacco use, is a smoker AND received Cessation Counseling Quality 110: Preventive Care And Screening: Influenza Immunization: Influenza Immunization previously received during influenza season Quality 131: Pain Assessment And Follow-Up: Pain assessment using a standardized tool is documented as negative, no follow-up plan required

## 2024-02-27 NOTE — H&P
[facility]    PATIENT'S NAME: TE MONGE   ATTENDING PHYSICIAN: Kendra Fritz MD   PATIENT ACCOUNT#:   [id number]    LOCATION:  [address]  MEDICAL RECORD #:   [id number]       YOB: 1985  ADMISSION DATE:       02/27/2024    HISTORY AND PHYSICAL EXAMINATION    CHIEF COMPLAINT:  Intractable right L5 radiculopathy.    HISTORY OF PRESENT ILLNESS:  The patient is a 38-year-old  male who was hospitalized in November 2023 and had right hemilaminectomy for severe right L5 radiculopathy from herniated disc.  The patient was doing well up until 2 weeks ago and started developing pain again radiating down from his right lumbar area toward the dorsum of his right foot.  No significant muscle weakness.  Pain has been intense to the point that he had been evaluated in the emergency room multiple times, twice in the last 2 weeks.  Today, he came back with recurrent pain.  He had an MRI scan of the lumbar spine which showed post L4-L5 hemilaminectomy with some small residual or recurrent right subarticular zone disc protrusion versus granulation tissue which results in mild to moderate right lateral recess stenosis and slight effacement of the traversing right L5 nerve root.  His spine surgeon, Dr. Dinero, recommended that he stay in the hospital for right L4-L5 laminectomy revision tomorrow.    PAST MEDICAL HISTORY:  None.    PAST SURGICAL HISTORY:  As mentioned above, right L4-L5 hemilaminectomy.    MEDICATIONS:  Please see medication reconciliation list.    ALLERGIES:  No known drug allergies.     FAMILY HISTORY:  None relevant.    SOCIAL HISTORY:  Occasional alcohol.  No tobacco or drug use.  Lives with his family.  Independent in his basic activities of daily living.    REVIEW OF SYSTEMS:  Pain radiates from the right lumbar area down the right leg to the dorsum of the right foot.  Sometimes his right foot wiggles but no clear muscle weakness.  No dysuria.  No urine  frequency.  No urine incontinence.  No change in bowel movements.  Other 12-point review of systems negative.      PHYSICAL EXAMINATION:    GENERAL:  Alert and oriented to time, place, and person, mild to moderate distress.  VITAL SIGNS:  Temperature 97.8, pulse 90, respiratory rate 16, blood pressure 130/92, pulse ox 99% on room air.  HEENT:  Atraumatic.  Oropharynx clear.  Moist mucous membranes.  Ears, nose normal.  Eyes:  Anicteric sclerae.    NECK:  Supple.  No lymphadenopathy.  Trachea midline.  Full range of motion.  LUNGS:  Clear to auscultation bilaterally.  Normal respiratory effort.   HEART:  Regular rate and rhythm.  S1 and S2 auscultated.  No murmur.  ABDOMEN:  Soft, nondistended.  No tenderness.  Positive bowel sounds.  EXTREMITIES:  No peripheral edema, clubbing, or cyanosis.  NEUROLOGIC:  Motor and sensory intact.     ASSESSMENT AND PLAN:  Right lumbar L5 radiculopathy as mentioned above with recurrent either disc protrusion or scar tissue.  The patient will be admitted to general medical floor.  Pain control.  N.p.o. after midnight.  Right hemilaminectomy L4-L5 revision tomorrow per Neurosurgery, Dr. Dinero.  Further recommendations to follow.    Dictated By Apryl Lee MD  d: 02/27/2024 14:48:28  t: 02/27/2024 15:33:54  Job [id number]  /

## 2024-02-27 NOTE — ED INITIAL ASSESSMENT (HPI)
Pt presents to ED from Dr. Dinero's office for lower back pain. Pt was told to come in for a stat MRI due to worsening pain and potential need for surgery tomorrow. Hx of back surgery 11/8/2023. Denies incontinence.

## 2024-02-27 NOTE — PLAN OF CARE
Patient A/Ox4, VSS, no c/o pain currently. Ambulates independently.       Problem: Patient Centered Care  Goal: Patient preferences are identified and integrated in the patient's plan of care  Description: Interventions:  - What would you like us to know as we care for you?   - Provide timely, complete, and accurate information to patient/family  - Incorporate patient and family knowledge, values, beliefs, and cultural backgrounds into the planning and delivery of care  - Encourage patient/family to participate in care and decision-making at the level they choose  - Honor patient and family perspectives and choices  Outcome: Progressing     Problem: Patient/Family Goals  Goal: Patient/Family Long Term Goal  Description: Patient's Long Term Goal:     Interventions:  -   - See additional Care Plan goals for specific interventions  Outcome: Progressing  Goal: Patient/Family Short Term Goal  Description: Patient's Short Term Goal:     Interventions:   -   - See additional Care Plan goals for specific interventions  Outcome: Progressing

## 2024-02-27 NOTE — ED QUICK NOTES
Byron arrived through triage with his Wife for c/o acute on chronic R lower back pain, progressively worsening over the past week or so with no identifiable trauma or strain. He denies urinary or bowel difficulties. Pt was referred to ED from Dr Dinero's office for emergent MRI. Pt states he underwent emergency back surgery approx 3 1/2 months ago and the pain presented similarly.

## 2024-02-28 ENCOUNTER — ANESTHESIA (OUTPATIENT)
Dept: SURGERY | Facility: HOSPITAL | Age: 39
End: 2024-02-28
Payer: COMMERCIAL

## 2024-02-28 ENCOUNTER — APPOINTMENT (OUTPATIENT)
Dept: GENERAL RADIOLOGY | Facility: HOSPITAL | Age: 39
End: 2024-02-28
Attending: NEUROLOGICAL SURGERY
Payer: COMMERCIAL

## 2024-02-28 ENCOUNTER — ANESTHESIA EVENT (OUTPATIENT)
Dept: SURGERY | Facility: HOSPITAL | Age: 39
End: 2024-02-28
Payer: COMMERCIAL

## 2024-02-28 ENCOUNTER — APPOINTMENT (OUTPATIENT)
Dept: GENERAL RADIOLOGY | Facility: HOSPITAL | Age: 39
DRG: 520 | End: 2024-02-28
Attending: NEUROLOGICAL SURGERY
Payer: COMMERCIAL

## 2024-02-28 LAB
ANION GAP SERPL CALC-SCNC: 5 MMOL/L (ref 0–18)
BUN BLD-MCNC: 22 MG/DL (ref 9–23)
BUN/CREAT SERPL: 22 (ref 10–20)
CALCIUM BLD-MCNC: 9.6 MG/DL (ref 8.7–10.4)
CHLORIDE SERPL-SCNC: 108 MMOL/L (ref 98–112)
CO2 SERPL-SCNC: 27 MMOL/L (ref 21–32)
CREAT BLD-MCNC: 1 MG/DL
DEPRECATED RDW RBC AUTO: 40.5 FL (ref 35.1–46.3)
EGFRCR SERPLBLD CKD-EPI 2021: 98 ML/MIN/1.73M2 (ref 60–?)
ERYTHROCYTE [DISTWIDTH] IN BLOOD BY AUTOMATED COUNT: 12.1 % (ref 11–15)
GLUCOSE BLD-MCNC: 108 MG/DL (ref 70–99)
HCT VFR BLD AUTO: 45.6 %
HGB BLD-MCNC: 15.7 G/DL
MCH RBC QN AUTO: 31.3 PG (ref 26–34)
MCHC RBC AUTO-ENTMCNC: 34.4 G/DL (ref 31–37)
MCV RBC AUTO: 91 FL
OSMOLALITY SERPL CALC.SUM OF ELEC: 294 MOSM/KG (ref 275–295)
PLATELET # BLD AUTO: 209 10(3)UL (ref 150–450)
POTASSIUM SERPL-SCNC: 4.5 MMOL/L (ref 3.5–5.1)
RBC # BLD AUTO: 5.01 X10(6)UL
SODIUM SERPL-SCNC: 140 MMOL/L (ref 136–145)
WBC # BLD AUTO: 7.2 X10(3) UL (ref 4–11)

## 2024-02-28 PROCEDURE — 99233 SBSQ HOSP IP/OBS HIGH 50: CPT | Performed by: HOSPITALIST

## 2024-02-28 PROCEDURE — 01NB0ZZ RELEASE LUMBAR NERVE, OPEN APPROACH: ICD-10-PCS | Performed by: NEUROLOGICAL SURGERY

## 2024-02-28 PROCEDURE — 3E0R33Z INTRODUCTION OF ANTI-INFLAMMATORY INTO SPINAL CANAL, PERCUTANEOUS APPROACH: ICD-10-PCS | Performed by: NEUROLOGICAL SURGERY

## 2024-02-28 PROCEDURE — 76000 FLUOROSCOPY <1 HR PHYS/QHP: CPT | Performed by: NEUROLOGICAL SURGERY

## 2024-02-28 PROCEDURE — 0SB20ZZ EXCISION OF LUMBAR VERTEBRAL DISC, OPEN APPROACH: ICD-10-PCS | Performed by: NEUROLOGICAL SURGERY

## 2024-02-28 DEVICE — DURASEAL® DURAL SEALANT SYSTEM 5ML 5 PACK
Type: IMPLANTABLE DEVICE | Site: BACK | Status: FUNCTIONAL
Brand: DURASEAL®

## 2024-02-28 RX ORDER — LIDOCAINE HYDROCHLORIDE 40 MG/ML
SOLUTION TOPICAL AS NEEDED
Status: DISCONTINUED | OUTPATIENT
Start: 2024-02-28 | End: 2024-02-28 | Stop reason: SURG

## 2024-02-28 RX ORDER — ROCURONIUM BROMIDE 10 MG/ML
INJECTION, SOLUTION INTRAVENOUS AS NEEDED
Status: DISCONTINUED | OUTPATIENT
Start: 2024-02-28 | End: 2024-02-28 | Stop reason: SURG

## 2024-02-28 RX ORDER — ONDANSETRON 2 MG/ML
INJECTION INTRAMUSCULAR; INTRAVENOUS AS NEEDED
Status: DISCONTINUED | OUTPATIENT
Start: 2024-02-28 | End: 2024-02-28 | Stop reason: SURG

## 2024-02-28 RX ORDER — CEFAZOLIN SODIUM/WATER 2 G/20 ML
2 SYRINGE (ML) INTRAVENOUS EVERY 8 HOURS
Status: COMPLETED | OUTPATIENT
Start: 2024-02-29 | End: 2024-02-29

## 2024-02-28 RX ORDER — CEFAZOLIN SODIUM/WATER 2 G/20 ML
SYRINGE (ML) INTRAVENOUS AS NEEDED
Status: DISCONTINUED | OUTPATIENT
Start: 2024-02-28 | End: 2024-02-28 | Stop reason: SURG

## 2024-02-28 RX ORDER — BISACODYL 10 MG
10 SUPPOSITORY, RECTAL RECTAL
Status: DISCONTINUED | OUTPATIENT
Start: 2024-02-28 | End: 2024-02-29

## 2024-02-28 RX ORDER — ENEMA 19; 7 G/133ML; G/133ML
1 ENEMA RECTAL ONCE AS NEEDED
Status: DISCONTINUED | OUTPATIENT
Start: 2024-02-28 | End: 2024-02-29

## 2024-02-28 RX ORDER — LIDOCAINE HYDROCHLORIDE 10 MG/ML
INJECTION, SOLUTION EPIDURAL; INFILTRATION; INTRACAUDAL; PERINEURAL AS NEEDED
Status: DISCONTINUED | OUTPATIENT
Start: 2024-02-28 | End: 2024-02-28 | Stop reason: SURG

## 2024-02-28 RX ORDER — ONDANSETRON 2 MG/ML
4 INJECTION INTRAMUSCULAR; INTRAVENOUS EVERY 6 HOURS PRN
Status: DISCONTINUED | OUTPATIENT
Start: 2024-02-28 | End: 2024-02-29

## 2024-02-28 RX ORDER — METHYLPREDNISOLONE ACETATE 40 MG/ML
INJECTION, SUSPENSION INTRA-ARTICULAR; INTRALESIONAL; INTRAMUSCULAR; SOFT TISSUE AS NEEDED
Status: DISCONTINUED | OUTPATIENT
Start: 2024-02-28 | End: 2024-02-28 | Stop reason: HOSPADM

## 2024-02-28 RX ORDER — DIPHENHYDRAMINE HYDROCHLORIDE 50 MG/ML
25 INJECTION INTRAMUSCULAR; INTRAVENOUS EVERY 4 HOURS PRN
Status: DISCONTINUED | OUTPATIENT
Start: 2024-02-28 | End: 2024-02-29

## 2024-02-28 RX ORDER — PROCHLORPERAZINE EDISYLATE 5 MG/ML
5 INJECTION INTRAMUSCULAR; INTRAVENOUS EVERY 8 HOURS PRN
Status: DISCONTINUED | OUTPATIENT
Start: 2024-02-28 | End: 2024-02-29

## 2024-02-28 RX ORDER — POLYETHYLENE GLYCOL 3350 17 G/17G
17 POWDER, FOR SOLUTION ORAL DAILY PRN
Status: DISCONTINUED | OUTPATIENT
Start: 2024-02-28 | End: 2024-02-29

## 2024-02-28 RX ORDER — BUPIVACAINE HYDROCHLORIDE AND EPINEPHRINE 2.5; 5 MG/ML; UG/ML
INJECTION, SOLUTION INFILTRATION; PERINEURAL AS NEEDED
Status: DISCONTINUED | OUTPATIENT
Start: 2024-02-28 | End: 2024-02-28 | Stop reason: HOSPADM

## 2024-02-28 RX ORDER — MORPHINE SULFATE 4 MG/ML
4 INJECTION, SOLUTION INTRAMUSCULAR; INTRAVENOUS EVERY 10 MIN PRN
Status: DISCONTINUED | OUTPATIENT
Start: 2024-02-28 | End: 2024-02-28 | Stop reason: HOSPADM

## 2024-02-28 RX ORDER — DIPHENHYDRAMINE HCL 25 MG
25 CAPSULE ORAL EVERY 4 HOURS PRN
Status: DISCONTINUED | OUTPATIENT
Start: 2024-02-28 | End: 2024-02-29

## 2024-02-28 RX ORDER — HYDROCODONE BITARTRATE AND ACETAMINOPHEN 5; 325 MG/1; MG/1
1 TABLET ORAL EVERY 6 HOURS PRN
Qty: 28 TABLET | Refills: 0 | Status: SHIPPED | OUTPATIENT
Start: 2024-02-28 | End: 2024-03-06

## 2024-02-28 RX ORDER — PROCHLORPERAZINE EDISYLATE 5 MG/ML
5 INJECTION INTRAMUSCULAR; INTRAVENOUS EVERY 8 HOURS PRN
Status: DISCONTINUED | OUTPATIENT
Start: 2024-02-28 | End: 2024-02-28 | Stop reason: HOSPADM

## 2024-02-28 RX ORDER — METHOCARBAMOL 750 MG/1
750 TABLET, FILM COATED ORAL 3 TIMES DAILY PRN
Status: DISCONTINUED | OUTPATIENT
Start: 2024-02-28 | End: 2024-02-29

## 2024-02-28 RX ORDER — HYDROMORPHONE HYDROCHLORIDE 1 MG/ML
0.6 INJECTION, SOLUTION INTRAMUSCULAR; INTRAVENOUS; SUBCUTANEOUS EVERY 5 MIN PRN
Status: DISCONTINUED | OUTPATIENT
Start: 2024-02-28 | End: 2024-02-28 | Stop reason: HOSPADM

## 2024-02-28 RX ORDER — SENNOSIDES 8.6 MG
17.2 TABLET ORAL NIGHTLY
Status: DISCONTINUED | OUTPATIENT
Start: 2024-02-28 | End: 2024-02-29

## 2024-02-28 RX ORDER — NALOXONE HYDROCHLORIDE 0.4 MG/ML
0.08 INJECTION, SOLUTION INTRAMUSCULAR; INTRAVENOUS; SUBCUTANEOUS AS NEEDED
Status: DISCONTINUED | OUTPATIENT
Start: 2024-02-28 | End: 2024-02-28 | Stop reason: HOSPADM

## 2024-02-28 RX ORDER — DOCUSATE SODIUM 100 MG/1
100 CAPSULE, LIQUID FILLED ORAL 2 TIMES DAILY
Status: DISCONTINUED | OUTPATIENT
Start: 2024-02-28 | End: 2024-02-29

## 2024-02-28 RX ORDER — ONDANSETRON 2 MG/ML
4 INJECTION INTRAMUSCULAR; INTRAVENOUS EVERY 6 HOURS PRN
Status: DISCONTINUED | OUTPATIENT
Start: 2024-02-28 | End: 2024-02-28 | Stop reason: HOSPADM

## 2024-02-28 RX ORDER — DEXAMETHASONE SODIUM PHOSPHATE 4 MG/ML
VIAL (ML) INJECTION AS NEEDED
Status: DISCONTINUED | OUTPATIENT
Start: 2024-02-28 | End: 2024-02-28 | Stop reason: SURG

## 2024-02-28 RX ORDER — SODIUM CHLORIDE, SODIUM LACTATE, POTASSIUM CHLORIDE, CALCIUM CHLORIDE 600; 310; 30; 20 MG/100ML; MG/100ML; MG/100ML; MG/100ML
INJECTION, SOLUTION INTRAVENOUS CONTINUOUS
Status: DISCONTINUED | OUTPATIENT
Start: 2024-02-28 | End: 2024-02-28 | Stop reason: HOSPADM

## 2024-02-28 RX ORDER — HYDROMORPHONE HYDROCHLORIDE 1 MG/ML
0.4 INJECTION, SOLUTION INTRAMUSCULAR; INTRAVENOUS; SUBCUTANEOUS EVERY 2 HOUR PRN
Status: DISCONTINUED | OUTPATIENT
Start: 2024-02-28 | End: 2024-02-29

## 2024-02-28 RX ORDER — MIDAZOLAM HYDROCHLORIDE 1 MG/ML
INJECTION INTRAMUSCULAR; INTRAVENOUS AS NEEDED
Status: DISCONTINUED | OUTPATIENT
Start: 2024-02-28 | End: 2024-02-28 | Stop reason: SURG

## 2024-02-28 RX ORDER — HYDROMORPHONE HYDROCHLORIDE 1 MG/ML
0.2 INJECTION, SOLUTION INTRAMUSCULAR; INTRAVENOUS; SUBCUTANEOUS EVERY 5 MIN PRN
Status: DISCONTINUED | OUTPATIENT
Start: 2024-02-28 | End: 2024-02-28 | Stop reason: HOSPADM

## 2024-02-28 RX ORDER — MORPHINE SULFATE 4 MG/ML
2 INJECTION, SOLUTION INTRAMUSCULAR; INTRAVENOUS EVERY 10 MIN PRN
Status: DISCONTINUED | OUTPATIENT
Start: 2024-02-28 | End: 2024-02-28 | Stop reason: HOSPADM

## 2024-02-28 RX ORDER — METHOCARBAMOL 750 MG/1
750 TABLET, FILM COATED ORAL 3 TIMES DAILY
Qty: 30 TABLET | Refills: 0 | Status: SHIPPED | OUTPATIENT
Start: 2024-02-28 | End: 2024-03-07

## 2024-02-28 RX ORDER — HYDROMORPHONE HYDROCHLORIDE 1 MG/ML
0.4 INJECTION, SOLUTION INTRAMUSCULAR; INTRAVENOUS; SUBCUTANEOUS EVERY 5 MIN PRN
Status: DISCONTINUED | OUTPATIENT
Start: 2024-02-28 | End: 2024-02-28 | Stop reason: HOSPADM

## 2024-02-28 RX ORDER — SODIUM CHLORIDE, SODIUM LACTATE, POTASSIUM CHLORIDE, CALCIUM CHLORIDE 600; 310; 30; 20 MG/100ML; MG/100ML; MG/100ML; MG/100ML
INJECTION, SOLUTION INTRAVENOUS CONTINUOUS PRN
Status: DISCONTINUED | OUTPATIENT
Start: 2024-02-28 | End: 2024-02-28 | Stop reason: SURG

## 2024-02-28 RX ORDER — MORPHINE SULFATE 10 MG/ML
6 INJECTION, SOLUTION INTRAMUSCULAR; INTRAVENOUS EVERY 10 MIN PRN
Status: DISCONTINUED | OUTPATIENT
Start: 2024-02-28 | End: 2024-02-28 | Stop reason: HOSPADM

## 2024-02-28 RX ORDER — HYDROMORPHONE HYDROCHLORIDE 1 MG/ML
0.2 INJECTION, SOLUTION INTRAMUSCULAR; INTRAVENOUS; SUBCUTANEOUS EVERY 2 HOUR PRN
Status: DISCONTINUED | OUTPATIENT
Start: 2024-02-28 | End: 2024-02-29

## 2024-02-28 RX ADMIN — ROCURONIUM BROMIDE 20 MG: 10 INJECTION, SOLUTION INTRAVENOUS at 16:11:00

## 2024-02-28 RX ADMIN — CEFAZOLIN SODIUM/WATER 2 G: 2 G/20 ML SYRINGE (ML) INTRAVENOUS at 16:07:00

## 2024-02-28 RX ADMIN — MIDAZOLAM HYDROCHLORIDE 2 MG: 1 INJECTION INTRAMUSCULAR; INTRAVENOUS at 15:37:00

## 2024-02-28 RX ADMIN — LIDOCAINE HYDROCHLORIDE 50 MG: 10 INJECTION, SOLUTION EPIDURAL; INFILTRATION; INTRACAUDAL; PERINEURAL at 15:43:00

## 2024-02-28 RX ADMIN — SODIUM CHLORIDE, SODIUM LACTATE, POTASSIUM CHLORIDE, CALCIUM CHLORIDE: 600; 310; 30; 20 INJECTION, SOLUTION INTRAVENOUS at 15:38:00

## 2024-02-28 RX ADMIN — DEXAMETHASONE SODIUM PHOSPHATE 8 MG: 4 MG/ML VIAL (ML) INJECTION at 15:43:00

## 2024-02-28 RX ADMIN — ONDANSETRON 4 MG: 2 INJECTION INTRAMUSCULAR; INTRAVENOUS at 16:58:00

## 2024-02-28 RX ADMIN — ROCURONIUM BROMIDE 30 MG: 10 INJECTION, SOLUTION INTRAVENOUS at 15:47:00

## 2024-02-28 RX ADMIN — LIDOCAINE HYDROCHLORIDE 4 ML: 40 SOLUTION TOPICAL at 15:46:00

## 2024-02-28 NOTE — ANESTHESIA PROCEDURE NOTES
Airway  Date/Time: 2/28/2024 3:47 PM  Urgency: Elective    Airway not difficult    General Information and Staff    Patient location during procedure: OR  Anesthesiologist: Brayden Morrow MD  Performed: anesthesiologist   Performed by: Brayden Morrow MD  Authorized by: Brayden Morrow MD      Indications and Patient Condition  Indications for airway management: anesthesia  Sedation level: deep  Preoxygenated: yes  Patient position: sniffing  Mask difficulty assessment: 1 - vent by mask    Final Airway Details  Final airway type: endotracheal airway      Successful airway: ETT  Cuffed: yes   Successful intubation technique: Video laryngoscopy  Endotracheal tube insertion site: oral  Blade: GlideScope  Blade size: #3  ETT size (mm): 7.5    Cormack-Lehane Classification: grade I - full view of glottis  Placement verified by: capnometry   Measured from: teeth  ETT to teeth (cm): 21  Number of attempts at approach: 1

## 2024-02-28 NOTE — ANESTHESIA PREPROCEDURE EVALUATION
Anesthesia PreOp Note    HPI:     Byron Garcia is a 38 year old male who presents for preoperative consultation requested by: Virgil Dinero MD    Date of Surgery: 2/27/2024 - 2/28/2024    Procedure(s):  RIGHT L4-5 MICRODISCECTOMY  Indication: Lumbar disc herniation [M51.26]    Relevant Problems   No relevant active problems       NPO:  Last Liquid Consumption Date: 02/27/24  Last Liquid Consumption Time: 2359  Last Solid Consumption Date: 02/27/24  Last Solid Consumption Time: 2359  Last Liquid Consumption Date: 02/27/24          History Review:  Patient Active Problem List    Diagnosis Date Noted    Lumbar disc herniation 02/27/2024    Cauda equina compression (HCC) 11/08/2023    Gastroesophageal reflux disease without esophagitis 04/03/2020    Odynophagia 04/03/2020    Bloating 04/03/2020       Past Medical History:   Diagnosis Date    Eye infection, right        Past Surgical History:   Procedure Laterality Date    HAND/FINGER SURGERY UNLISTED  2008    LUMBAR SPINE SURGERY  11/08/2023    L4-5 LUMBAR LAMINECTOMY, RESECTION OF DISC       Medications Prior to Admission   Medication Sig Dispense Refill Last Dose    cyclobenzaprine 10 MG Oral Tab Take 1 tablet (10 mg total) by mouth 3 (three) times daily as needed.   Past Week    cyclobenzaprine 5 MG Oral Tab    Past Week    HYDROcodone-acetaminophen 5-325 MG Oral Tab Take 1 tablet by mouth every 4 (four) hours as needed.   2/27/2024    acetaminophen 500 MG Oral Tab Take 2 tablets (1,000 mg total) by mouth every 6 (six) hours as needed for Pain. 56 tablet 0 2/26/2024    buPROPion  MG Oral Tablet 24 Hr Take 1 tablet (300 mg total) by mouth before breakfast.   2/27/2024    ALPRAZolam 0.25 MG Oral Tab Take 1 tablet (0.25 mg total) by mouth 2 (two) times daily as needed.   More than a month    amoxicillin 500 MG Oral Tab    Unknown    celecoxib 100 MG Oral Cap Take 1 capsule (100 mg total) by mouth 2 (two) times daily.   More than a month    ibuprofen 600 MG  Oral Tab Take 1 tablet (600 mg total) by mouth every 6 (six) hours as needed.   More than a month    predniSONE 10 MG Oral Tab TAKE 3 TABLETS BY MOUTH TWICE DAILY FOR 2 DAYS THEN TAKE 2 TABLETS BY MOUTH TWICE DAILY FOR 2 DAYS THEN TAKE 1 TABLET BY MOUTH TWICE DAILY FOR 2 DAYS   Unknown    rizatriptan 10 MG Oral Tablet Dispersible TAKE 1 TABLET BY MOUTH ONCE AS NEEDED FOR FOR UP TO 1 DOSE. MAY REPEAT AFTER 1 HOUR   Unknown    venlafaxine ER 37.5 MG Oral Capsule SR 24 Hr Take 1 capsule (37.5 mg total) by mouth daily.   Unknown    vilazodone 10 MG Oral Tab Take 1 tablet (10 mg total) by mouth daily with breakfast.   Unknown    naproxen 500 MG Oral Tab Take 1 tablet (500 mg total) by mouth 2 (two) times daily as needed. 28 tablet 0 More than a month    [] cyclobenzaprine 10 MG Oral Tab Take 1 tablet (10 mg total) by mouth 3 (three) times daily as needed for Muscle spasms. 20 tablet 0     [] gabapentin 300 MG Oral Cap Take 1 capsule (300 mg total) by mouth daily for 1 day, THEN 1 capsule (300 mg total) 2 (two) times daily for 1 day, THEN 1 capsule (300 mg total) 3 (three) times daily for 28 days. 87 capsule 0      Current Facility-Administered Medications Ordered in Epic   Medication Dose Route Frequency Provider Last Rate Last Admin    [MAR Hold] acetaminophen (Tylenol Extra Strength) tab 500 mg  500 mg Oral Q4H PRN Apryl Lee MD        [MAR Hold] ondansetron (Zofran) 4 MG/2ML injection 4 mg  4 mg Intravenous Q6H PRN Apryl Lee MD        [MAR Hold] prochlorperazine (Compazine) 10 MG/2ML injection 5 mg  5 mg Intravenous Q8H PRN Apryl Lee MD        [MAR Hold] temazepam (Restoril) cap 15 mg  15 mg Oral Nightly PRN Apryl Lee MD        [MAR Hold] morphINE PF 2 MG/ML injection 1 mg  1 mg Intravenous Q2H PRN Apryl Lee MD        Or    [MAR Hold] morphINE PF 2 MG/ML injection 2 mg  2 mg Intravenous Q2H PRN Apryl Lee MD        Or    [MAR Hold] morphINE PF 4 MG/ML injection 4  mg  4 mg Intravenous Q2H PRN Apryl Lee MD        [MAR Hold] acetaminophen (Tylenol) tab 650 mg  650 mg Oral Q4H PRN Apryl Lee MD        Or    [MAR Hold] HYDROcodone-acetaminophen (Norco) 5-325 MG per tab 1 tablet  1 tablet Oral Q4H PRN Apryl Lee MD   1 tablet at 02/28/24 1228    Or    [MAR Hold] HYDROcodone-acetaminophen (Norco) 5-325 MG per tab 2 tablet  2 tablet Oral Q4H PRN Apryl Lee MD        [MAR Hold] gabapentin (Neurontin) cap 100 mg  100 mg Oral TID Apryl Lee MD        [MAR Hold] ALPRAZolam (Xanax) tab 0.25 mg  0.25 mg Oral BID PRN Apryl Lee MD        [MAR Hold] buPROPion ER (Wellbutrin XL) 24 hr tab 300 mg  300 mg Oral Before breakfast Apryl Lee MD   300 mg at 02/28/24 0708    [MAR Hold] cyclobenzaprine (Flexeril) tab 10 mg  10 mg Oral TID PRN Apryl Lee MD         No current [facility]-ordered outpatient medications on file.       No Known Allergies    Family History   Problem Relation Age of Onset    Cancer Father 62        Bladder     Social History     Socioeconomic History    Marital status:    Tobacco Use    Smoking status: Every Day     Packs/day: .5     Types: Cigarettes    Smokeless tobacco: Never   Vaping Use    Vaping Use: Never used   Substance and Sexual Activity    Alcohol use: Yes    Drug use: Never       Available pre-op labs reviewed.  Lab Results   Component Value Date    WBC 7.2 02/28/2024    RBC 5.01 02/28/2024    HGB 15.7 02/28/2024    HCT 45.6 02/28/2024    MCV 91.0 02/28/2024    MCH 31.3 02/28/2024    MCHC 34.4 02/28/2024    RDW 12.1 02/28/2024    .0 02/28/2024     Lab Results   Component Value Date     02/28/2024    K 4.5 02/28/2024     02/28/2024    CO2 27.0 02/28/2024    BUN 22 02/28/2024    CREATSERUM 1.00 02/28/2024     (H) 02/28/2024    CA 9.6 02/28/2024          Vital Signs:  Body mass index is 25.33 kg/m².   height is 1.727 m (5' 8\") and weight is 75.6 kg (166 lb 9.6 oz). His oral temperature is  98.1 °F (36.7 °C). His blood pressure is 135/86 and his pulse is 92. His respiration is 16 and oxygen saturation is 98%.   Vitals:    02/28/24 0027 02/28/24 0432 02/28/24 1231 02/28/24 1510   BP: 136/76 125/85 122/90 135/86   Pulse: 78 73 88 92   Resp: 16 16 16 16   Temp: 98.4 °F (36.9 °C) 97.7 °F (36.5 °C) 97.8 °F (36.6 °C) 98.1 °F (36.7 °C)   TempSrc: Oral Oral Oral Oral   SpO2: 94% 94% 95% 98%   Weight:       Height:            Anesthesia Evaluation     Patient summary reviewed and Nursing notes reviewed    No history of anesthetic complications   Airway   Mallampati: II  TM distance: >3 FB  Neck ROM: full  Dental - Dentition appears grossly intact     Pulmonary - normal exam   (-) COPD, asthma  Cardiovascular - negative ROS and normal exam  Exercise tolerance: good  (-) hypertension    Neuro/Psych      GI/Hepatic/Renal    (+) GERD    Endo/Other    Abdominal                  Anesthesia Plan:   ASA:  2  Plan:   General  Airway:  ETT  Post-op Pain Management: Oral pain medication and IV analgesics  Informed Consent Plan and Risks Discussed With:  Patient and spouse      I have informed Byron Gillisnzen and/or legal guardian or family member of the nature of the anesthetic plan, benefits, risks including possible dental damage if relevant, major complications, and any alternative forms of anesthetic management.   All of the patient's questions were answered to the best of my ability. The patient desires the anesthetic management as planned.  Brayden Morrow MD  2/28/2024 3:22 PM  Present on Admission:  **None**

## 2024-02-28 NOTE — PLAN OF CARE
Patient A/Ox4, RA, VSS, pain controlled with norco, surgery set for 4pm today      Problem: Patient Centered Care  Goal: Patient preferences are identified and integrated in the patient's plan of care  Description: Interventions:  - What would you like us to know as we care for you?   - Provide timely, complete, and accurate information to patient/family  - Incorporate patient and family knowledge, values, beliefs, and cultural backgrounds into the planning and delivery of care  - Encourage patient/family to participate in care and decision-making at the level they choose  - Honor patient and family perspectives and choices  Outcome: Progressing     Problem: Patient/Family Goals  Goal: Patient/Family Long Term Goal  Description: Patient's Long Term Goal:     Interventions:  -   - See additional Care Plan goals for specific interventions  Outcome: Progressing  Goal: Patient/Family Short Term Goal  Description: Patient's Short Term Goal:     Interventions:   -   - See additional Care Plan goals for specific interventions  Outcome: Progressing     Problem: PAIN - ADULT  Goal: Verbalizes/displays adequate comfort level or patient's stated pain goal  Description: INTERVENTIONS:  - Encourage pt to monitor pain and request assistance  - Assess pain using appropriate pain scale  - Administer analgesics based on type and severity of pain and evaluate response  - Implement non-pharmacological measures as appropriate and evaluate response  - Consider cultural and social influences on pain and pain management  - Manage/alleviate anxiety  - Utilize distraction and/or relaxation techniques  - Monitor for opioid side effects  - Notify MD/LIP if interventions unsuccessful or patient reports new pain  - Anticipate increased pain with activity and pre-medicate as appropriate  Outcome: Progressing

## 2024-02-28 NOTE — OPERATIVE REPORT
VIRGIL COYLE M.D., F.A.A.N.S.     of Neurosurgery  [facility]  Board Certified Neurosurgeon                          [address]  [address] [place] [address]    PHONE  [phone]          FAX  [phone]    [email or web address]      OPERATIVE REPORT    PATIENT NAME: Byron Garcia    DATE OF OPERATION: 2/28/2024     PREOPERATIVE DIAGNOSIS:  1. Lumbar spondylosis with radiculopathy             2. Lumbar disc re- herniation L4-5    POSTOPERATIVE DIAGNOSIS: 1. same               2. same    OPERATIVE PROCEDURE:    1.  Lumbar 4 through 5 laminectomy and 4 through 5 right medial facetectomy, foraminotomy and redo microdiscectomy.   2.  Application of 40 mg of epidural DepoMedrol at each lumbar 4 through 5.   3.  Use of intraoperative microscope and microsurgical technique.      CPT CODES: 47341 62323-51x1, 29445-84, 52329-29    SURGEON:  Virgil Coyle M.D.    ASSISTANT: Mukund Rice PA-C    ANESTHESIA: General endotracheal anesthesia with TIVA and local anesthetic.    ESTIMATED BLOOD LOSS: 5 cc    INTRAVENOUS FLUIDS AND URINE OUTPUT: Per anesthesia sheet    TRANSFUSION: None    SPECIMEN: None    COMPLICATIONS: none    PROCEDURE:    After informed consent was obtained, the patient was brought to the operating room.  After the uneventful induction of general endotracheal anesthesia, the patient was then positioned on the operating room table, a general OR table with a Shar Frame, in the prone position. The arms were supported and the neck physiologically extended. All pressure points were adequately padded.  The patient's eyes were protected from exposure to prolonged pressure.     Subsequently, we remarked the prior incision site.  It was marked out on the skin.  The patient was prepped and draped sterilely.  The C-arm was also draped  sterilely into the field.  Perioperative antibiosis and steroids were administered within 1 hour of incision.  We then infiltrated the incision with our usual local anesthetic. We incised utilizing a 10-blade scalpel. The subcutaneous tissues were opened with a Bovie down to the fascia.    We then used sequential dilators carefully down the previous tract to place a tubular retractor at the L4-5 level along the right laminofacet junction at the site of the prior laminotomy defect. This was done with fluoroscopic guidance. The tubular retractor was then connected to a table-mounted arm for added stability. At this point in time, the microscope entered the surgical theater and the rest of the procedure was completed, utilizing microsurgical technique.    The soft tissues and scar/granulation tissue overlying the laminofacet junction and prior laminotomy defect were resected utilizing mono- and bipolar electrocautery. We then used a high-speed mono to complete and extend the decompression of the right lateral recess. The residual decompression was accomplished with bayonetted Kerrison rongeurs, The en-passage ipsilateral nerve root was identified. We gently retracted the nerve medially with the suction-nerve root retractor and performed an annulotomy with a disc knife. We then completed a thorough discectomy utilizing a down-pushing curette, micro-pituitaries and a ball-tip hook. We continued the discectomy until, overall, the Dura was pulsating in a relaxed fashion, indicating adequate decompression. At this point in time, we irrigated vigorously and obtained hemostasis meticulously with bipolar electrocautery and FlowSeal. 40 mg of DepoMedrol were diluted in 2 ml of sterile injectable saline and placed in a syringe connected to a small angio-catheter. We applied the DepoMedrol with microscopic guidance through the same incision onto the inflamed Dura to provide more immediate neurological improvement. The tubular  retractor was then slowly removed, verifying hemostasis at every point of the way meticulously with bipolar electrocautery. The microscope exited the surgical theater at this point.        The deep fascia was then closed with interrupted 0 Vicryl suture. The subcutaneous tissues were copiously irrigated and closed with an interrupted inverted 3-0 Vicryl suture. The skin was closed with 4-0 Vicryl and Dermabond.     There were no complications.  All counts were reported correct. The patient was returned to the supine position, extubated in the operating room, and taken to the recovery room in satisfactory condition, having tolerated the procedure well and moving all fours strongly to command.    Please fax a copy of this report to my office at [phone] and the Primary Care Provider of this patient.        Virgil Dinero M.D., F.A.A.N.S.    2/28/2024  4:52 PM

## 2024-02-28 NOTE — ANESTHESIA POSTPROCEDURE EVALUATION
Patient: Byron Garcia    Procedure Summary       Date: 02/28/24 Room / Location: [facility] MAIN OR 09 / [facility] MAIN OR    Anesthesia Start: 1537 Anesthesia Stop: 1719    Procedure: RIGHT L4-5 MICRODISCECTOMY (Right: Spine Lumbar) Diagnosis:       Lumbar disc herniation      (Lumbar disc herniation [M51.26])    Surgeons: Virgil Dinero MD Anesthesiologist: Brayden Morrow MD    Anesthesia Type: general ASA Status: 2            Anesthesia Type: general    Vitals Value Taken Time   /74 02/28/24 1741   Temp 98.5 02/28/24 1744   Pulse 84 02/28/24 1744   Resp 20 02/28/24 1744   SpO2 95 % 02/28/24 1744   Vitals shown include unfiled device data.    [facility] AN Post Evaluation:   Patient Evaluated in PACU  Patient Participation: complete - patient participated  Level of Consciousness: awake and alert  Pain Score: 0  Pain Management: adequate  Airway Patency:patent  Dental exam unchanged from preop  Yes    Cardiovascular Status: acceptable  Respiratory Status: acceptable  Postoperative Hydration acceptable      Brayden Morrow MD  2/28/2024 5:44 PM

## 2024-02-28 NOTE — PROGRESS NOTES
[facility]    Progress Note    Byron Garcia Patient Status:  Inpatient    1985 MRN [id number]   Location [facility] OPERATING ROOM Attending Selma Pack MD   Hosp Day # 1 PCP RAJIV CALVILLO       Subjective:   Byron Garcia iis feeling ok. Pain is controlled when he is not moving. Nervous about the surgery today.    Objective:   Blood pressure 135/86, pulse 92, temperature 98.1 °F (36.7 °C), temperature source Oral, resp. rate 16, height 5' 8\" (1.727 m), weight 166 lb 9.6 oz (75.6 kg), SpO2 98%.    Physical Exam:    General: No acute distress.   Respiratory: Clear to auscultation bilaterally. No wheezes. No rhonchi.  Cardiovascular: S1, S2. Regular rate and rhythm. No murmurs, rubs or gallops.   Abdomen: Soft, nontender, nondistended.  Positive bowel sounds. No rebound or guarding.  Neurologic: No focal neurological deficits.   Musculoskeletal: Moves all extremities.  Extremities: No edema.    Results:     Lab Results   Component Value Date    WBC 7.2 2024    HGB 15.7 2024    HCT 45.6 2024    .0 2024    CREATSERUM 1.00 2024    BUN 22 2024     2024    K 4.5 2024     2024    CO2 27.0 2024     (H) 2024    CA 9.6 2024       MRI SPINE LUMBAR (CPT=72148)    Result Date: 2024  CONCLUSION:   1. L4-L5:  Postoperative changes of right L4-L5 hemilaminectomy and discectomy are again identified.  Small residual or recurrent right subarticular zone disc protrusion versus granulation tissue, which results in mild-to-moderate right lateral recess stenosis and slight effacement of the traversing right L5 nerve root.  This finding is very similar in comparison to comparison postoperative lumbar MR from 12 days prior.  Stable additional mild spinal canal and mild right neural foraminal stenosis at this level.  2. There is a 3.5 x 2.2 cm fluid collection within the right paramedian  posterior soft tissues at L4-L5, which has decreased in size since prior.  This likely relates to a resolving postoperative seroma, but superinfection cannot be excluded. 3. Edema at the right L4-L5 endplates, right L5 pedicle, and right greater than left posterior paraspinous musculature at L4-L5.  Differential considerations include sequelae of prior operative intervention, degenerative stress response, or altered biomechanics. 4. Remaining lumbar levels do not demonstrate significant degenerative changes or neural compromise. 5. Lesser incidental findings as above.   elm-remote  Dictated by (CST): Ousmane Hernandez MD on 2/27/2024 at 2:05 PM     Finalized by (CST): Ousmane Hernandez MD on 2/27/2024 at 2:13 PM              [MAR Hold] gabapentin  100 mg Oral TID    [MAR Hold] buPROPion ER  300 mg Oral Before breakfast     gelatin adsorbable, thrombin, methylPREDNISolone acetate, [MAR Hold] acetaminophen, [MAR Hold] ondansetron, [MAR Hold] prochlorperazine, [MAR Hold] temazepam, [MAR Hold] morphINE **OR** [MAR Hold] morphINE **OR** [MAR Hold] morphINE, [MAR Hold] acetaminophen **OR** [MAR Hold] HYDROcodone-acetaminophen **OR** [MAR Hold] HYDROcodone-acetaminophen, [MAR Hold] ALPRAZolam, [MAR Hold] cyclobenzaprine      Assessment and Plan:     Lumbar disc herniation  - Neurosurgery on board  - NPO  - Right hemilaminectomy of L4-L5 revision today  - pain control  - PT/OT tomorrow  - labs in AM.         Quality:  DVT Prophylaxis: SCDs  CODE status: Full    MDM High. Time spent on chart/note review, review of labs/imaging, discussion with patient, physical exam, discussion with staff, consultants, coordinating care, writing progress note, and discussion of plan of care.       ANDREINA JOHNSON MD  02/28/24

## 2024-02-28 NOTE — PLAN OF CARE
VIRGIL COYLE M.D., ERNESTO.N.S.     of Neurosurgery  [facility]  Board Certified Neurosurgeon                              [address]  [address] [place], [place] [address]    PHONE  [phone]          FAX  [phone]    [email or web address]    [facility]     LAMINECTOMY DISCLAIMER AND CONSENT        2/28/2024  3:13 PM    PRE-OPERATIVE CONSULTATION                           Byron Garcia was again informed of the nature of the problem, planned treatment, indications and alternatives.  We again reviewed the expected benefits of surgery, as well as all reasonably foreseeable risks, including but not limited to infection, bleeding requiring transfusion or reoperation, no relief or worsening of the pain, numbness, weakness, need for assistive devices to get around, injury to the nerves, paralysis, loss of control of the legs/bladder/bowel/sexual function, spinal fluid leak, scar formation, abnormal movement of the bones on each other requiring fusion surgery to fix, heart attack, blood clot formation, pulmonary embolism, stroke, coma and death. his questions were all answered and he understands what we discussed.  Byron Gillisnzen also understands that no guarantees can be made regarding outcome or results.  he agrees the benefits of surgery outweigh the risks, and wishes to proceed with surgery.    Virgil Coyle M.D., [email or web address].

## 2024-02-29 VITALS
WEIGHT: 166.63 LBS | RESPIRATION RATE: 16 BRPM | SYSTOLIC BLOOD PRESSURE: 127 MMHG | HEIGHT: 68 IN | DIASTOLIC BLOOD PRESSURE: 70 MMHG | TEMPERATURE: 98 F | HEART RATE: 86 BPM | OXYGEN SATURATION: 96 % | BODY MASS INDEX: 25.25 KG/M2

## 2024-02-29 PROBLEM — Z98.890 S/P LUMBAR MICRODISCECTOMY: Status: ACTIVE | Noted: 2024-02-29

## 2024-02-29 LAB
ANION GAP SERPL CALC-SCNC: 5 MMOL/L (ref 0–18)
BUN BLD-MCNC: 15 MG/DL (ref 9–23)
BUN/CREAT SERPL: 14.7 (ref 10–20)
CALCIUM BLD-MCNC: 10.1 MG/DL (ref 8.7–10.4)
CHLORIDE SERPL-SCNC: 101 MMOL/L (ref 98–112)
CO2 SERPL-SCNC: 30 MMOL/L (ref 21–32)
CREAT BLD-MCNC: 1.02 MG/DL
DEPRECATED RDW RBC AUTO: 39.2 FL (ref 35.1–46.3)
EGFRCR SERPLBLD CKD-EPI 2021: 95 ML/MIN/1.73M2 (ref 60–?)
ERYTHROCYTE [DISTWIDTH] IN BLOOD BY AUTOMATED COUNT: 11.7 % (ref 11–15)
GLUCOSE BLD-MCNC: 131 MG/DL (ref 70–99)
HCT VFR BLD AUTO: 47.9 %
HGB BLD-MCNC: 17 G/DL
MCH RBC QN AUTO: 32.2 PG (ref 26–34)
MCHC RBC AUTO-ENTMCNC: 35.5 G/DL (ref 31–37)
MCV RBC AUTO: 90.7 FL
OSMOLALITY SERPL CALC.SUM OF ELEC: 285 MOSM/KG (ref 275–295)
PLATELET # BLD AUTO: 263 10(3)UL (ref 150–450)
POTASSIUM SERPL-SCNC: 4.8 MMOL/L (ref 3.5–5.1)
RBC # BLD AUTO: 5.28 X10(6)UL
SODIUM SERPL-SCNC: 136 MMOL/L (ref 136–145)
WBC # BLD AUTO: 19.2 X10(3) UL (ref 4–11)

## 2024-02-29 PROCEDURE — 99024 POSTOP FOLLOW-UP VISIT: CPT | Performed by: NEUROLOGICAL SURGERY

## 2024-02-29 PROCEDURE — 99239 HOSP IP/OBS DSCHRG MGMT >30: CPT | Performed by: HOSPITALIST

## 2024-02-29 RX ORDER — ONDANSETRON 4 MG/1
4 TABLET, ORALLY DISINTEGRATING ORAL EVERY 8 HOURS PRN
Qty: 21 TABLET | Refills: 0 | Status: SHIPPED | OUTPATIENT
Start: 2024-02-29 | End: 2024-03-07

## 2024-02-29 RX ORDER — ONDANSETRON 4 MG/1
4 TABLET, FILM COATED ORAL ONCE
Status: COMPLETED | OUTPATIENT
Start: 2024-02-29 | End: 2024-02-29

## 2024-02-29 NOTE — PROGRESS NOTES
JOSIAH COYLE M.D., F.A.A.N.S.     of Neurosurgery  [facility]  Board Certified Neurosurgeon                          [facility]  part of [facility] Neurosurgery        [place] [facility]      [address]  [address]  [place] [address]    PHONE  [phone]          FAX  [phone]    [email or web address]      NEUROSURGERY PROGRESS NOTE      Byron Garcia Patient Status:  Inpatient    1985 MRN [id number]   Location [facility] Attending Selma Pack MD   Hosp Day # 2 PCP RAJIV CALVILLO     Subjective:  Byron Garcia is a(n) 38 year old male.  Alert, orientated x3.  Cooperative.  No apparent distress.  Resolution of RLE pain    Vital Signs:    Temp:  [97.7 °F (36.5 °C)-98.1 °F (36.7 °C)] 97.9 °F (36.6 °C)  Pulse:  [] 86  Resp:  [15-21] 18  BP: (108-135)/(70-90) 127/70  SpO2:  [93 %-98 %] 96 %    I/O:  I/O last 3 completed shifts:  In: 1890 [P.O.:870; I.V.:1000; IV PIGGYBACK:20]  Out: 500 [Urine:500]    Inpatient Medications:    Current Facility-Administered Medications:     methocarbamol (Robaxin) tab 750 mg, 750 mg, Oral, TID PRN    sennosides (Senokot) tab 17.2 mg, 17.2 mg, Oral, Nightly    docusate sodium (Colace) cap 100 mg, 100 mg, Oral, BID    polyethylene glycol (PEG 3350) (Miralax) 17 g oral packet 17 g, 17 g, Oral, Daily PRN    magnesium hydroxide (Milk of Magnesia) 400 MG/5ML oral suspension 30 mL, 30 mL, Oral, Daily PRN    bisacodyl (Dulcolax) 10 MG rectal suppository 10 mg, 10 mg, Rectal, Daily PRN    fleet enema (Fleet) 7-19 GM/118ML rectal enema 133 mL, 1 enema, Rectal, Once PRN    ondansetron (Zofran) 4 MG/2ML injection 4 mg, 4 mg, Intravenous, Q6H PRN    prochlorperazine (Compazine) 10 MG/2ML injection 5 mg, 5 mg, Intravenous, Q8H PRN    diphenhydrAMINE (Benadryl) cap/tab 25 mg, 25 mg, Oral, Q4H PRN **OR** diphenhydrAMINE (Benadryl) 50 mg/mL   injection 25 mg, 25 mg, Intravenous, Q4H PRN    ceFAZolin (Ancef) 2 g in 20mL IV syringe premix, 2 g, Intravenous, Q8H    benzocaine-menthol (Cepacol) lozenge 1 lozenge, 1 lozenge, Buccal, Q15 Min PRN    HYDROmorphone (Dilaudid) 1 MG/ML injection 0.2 mg, 0.2 mg, Intravenous, Q2H PRN **OR** HYDROmorphone (Dilaudid) 1 MG/ML injection 0.4 mg, 0.4 mg, Intravenous, Q2H PRN    acetaminophen (Tylenol Extra Strength) tab 500 mg, 500 mg, Oral, Q4H PRN    ondansetron (Zofran) 4 MG/2ML injection 4 mg, 4 mg, Intravenous, Q6H PRN    prochlorperazine (Compazine) 10 MG/2ML injection 5 mg, 5 mg, Intravenous, Q8H PRN    temazepam (Restoril) cap 15 mg, 15 mg, Oral, Nightly PRN    morphINE PF 2 MG/ML injection 1 mg, 1 mg, Intravenous, Q2H PRN **OR** morphINE PF 2 MG/ML injection 2 mg, 2 mg, Intravenous, Q2H PRN **OR** morphINE PF 4 MG/ML injection 4 mg, 4 mg, Intravenous, Q2H PRN    acetaminophen (Tylenol) tab 650 mg, 650 mg, Oral, Q4H PRN **OR** HYDROcodone-acetaminophen (Norco) 5-325 MG per tab 1 tablet, 1 tablet, Oral, Q4H PRN **OR** HYDROcodone-acetaminophen (Norco) 5-325 MG per tab 2 tablet, 2 tablet, Oral, Q4H PRN    gabapentin (Neurontin) cap 100 mg, 100 mg, Oral, TID    ALPRAZolam (Xanax) tab 0.25 mg, 0.25 mg, Oral, BID PRN    buPROPion ER (Wellbutrin XL) 24 hr tab 300 mg, 300 mg, Oral, Before breakfast    cyclobenzaprine (Flexeril) tab 10 mg, 10 mg, Oral, TID PRN    Labs:  Lab Results   Component Value Date    WBC 19.2 (H) 02/29/2024    HGB 17.0 02/29/2024    .0 02/29/2024    BUN 15 02/29/2024     02/29/2024    K 4.8 02/29/2024    CO2 30.0 02/29/2024     (H) 02/29/2024         Neurological Exam:  AAOx3, following commands  PERRLA  EOMI  MAEx4  Sensation symmetrical  Incision c/d/I    Abdomen:  Soft, non-distended, non-tender, with no rebound or guarding.  No peritoneal signs.   Extremities:  Non-tender, no lower extremity edema noted.        Imaging:  XR FLUOROSCOPY C-ARM TIME LESS THAN 1 HOUR  (CPT=76000)    Result Date: 2/28/2024  CONCLUSION: Fluoroscopic guidance as above.  21.2-seconds of fluoroscopy time were used.  A single (x1) fluoroscopic image as well as a 1 page-dose summary image are stored with this exam.  RADIATION DOSE (Dose Area Product):  0.75027-sCq*m^2.   Dictated by (CST): Chang Coe MD on 2/28/2024 at 8:36 PM     Finalized by (CST): Chang Coe MD on 2/28/2024 at 8:37 PM           Assessment/Plan:  Principal Problem:    Lumbar disc herniation    POD#1 s/p redo microdiscectomy, doing well  Home today.  F/u in 2 weeks    All questions and concerns were addressed. We appreciate the opportunity to participate in the care of this patient. Please do not hesitate to call our office ([phone]) with any issues.          Virgil Dinero M.D., F.A.A.N.S.    2/29/2024  7:43 AM

## 2024-02-29 NOTE — DISCHARGE SUMMARY
[facility]  part of [facility]    Discharge Summary    Byron Garcia Patient Status:  Inpatient    1985 MRN [id number]   Location [facility] Attending Selma Pack MD   Hosp Day # 2 PCP RAJIV CALVILLO     Date of Admission: 2024   Date of Discharge: 2024    Hospital Discharge Diagnoses: Acute Lumbar Disc Herniation    Lace+ Score: 57  59-90 High Risk  29-58 Medium Risk  0-28   Low Risk.    TCM Follow-Up Recommendation:  LACE 29-58: Moderate Risk of readmission after discharge from the hospital.        Admitting Diagnosis: Lumbar disc herniation [M51.26]    Disposition: Home    Discharge Diagnosis: .Principal Problem:    Lumbar disc herniation  Active Problems:    S/P lumbar microdiscectomy      Hospital Course:   Reason for Admission:   Per Dr. Lee  The patient is a 38-year-old  male who was hospitalized in 2023 and had right hemilaminectomy for severe right L5 radiculopathy from herniated disc. The patient was doing well up until 2 weeks ago and started developing pain again radiating down from his right lumbar area toward the dorsum of his right foot. No significant muscle weakness. Pain has been intense to the point that he had been evaluated in the emergency room multiple times, twice in the last 2 weeks. Today, he came back with recurrent pain. He had an MRI scan of the lumbar spine which showed post L4-L5 hemilaminectomy with some small residual or recurrent right subarticular zone disc protrusion versus granulation tissue which results in mild to moderate right lateral recess stenosis and slight effacement of the traversing right L5 nerve root. His spine surgeon, Dr. Dinero, recommended that he stay in the hospital for right L4-L5 laminectomy revision tomorrow.     Discharge Physical Exam:   Physical Exam:    General: No acute distress.   Respiratory: Clear to auscultation bilaterally. No wheezes. No rhonchi.  Cardiovascular: S1, S2.  Regular rate and rhythm. No murmurs, rubs or gallops.   Abdomen: Soft, nontender, nondistended.  Positive bowel sounds. No rebound or guarding.  Neurologic: No focal neurological deficits.   Musculoskeletal: Moves all extremities.    Hospital Course:    Lumbar disc herniation  - Neurosurgery on board  - Right hemilaminectomy of L4-L5 revision POD 1  - pain control  - zofran for nausea  - home today           Quality:  DVT Prophylaxis: SCDs  CODE status: Full      Complications: none    Consultants         Provider   Role Specialty     Virgil Dinero MD  Consulting Physician NEUROSURGERY          Surgical Procedures       Case IDs Date Procedure Surgeon Location Status    [id number] 2/28/24 RIGHT L4-5 MICRODISCECTOMY Virgil Dinero MD [facility] MAIN OR [facility]              Discharge Plan:   Discharge Condition: Stable    Current Discharge Medication List        New Orders    Details   methocarbamol 750 MG Oral Tab Take 1 tablet (750 mg total) by mouth 3 (three) times daily for 10 days.           Home Meds - Modified    Details   HYDROcodone-acetaminophen 5-325 MG Oral Tab Take 1 tablet by mouth every 6 (six) hours as needed for Pain.           Home Meds - Unchanged    Details   acetaminophen 500 MG Oral Tab Take 2 tablets (1,000 mg total) by mouth every 6 (six) hours as needed for Pain.      buPROPion  MG Oral Tablet 24 Hr Take 1 tablet (300 mg total) by mouth before breakfast.      ALPRAZolam 0.25 MG Oral Tab Take 1 tablet (0.25 mg total) by mouth 2 (two) times daily as needed.      amoxicillin 500 MG Oral Tab       rizatriptan 10 MG Oral Tablet Dispersible TAKE 1 TABLET BY MOUTH ONCE AS NEEDED FOR FOR UP TO 1 DOSE. MAY REPEAT AFTER 1 HOUR      venlafaxine ER 37.5 MG Oral Capsule SR 24 Hr Take 1 capsule (37.5 mg total) by mouth daily.      vilazodone 10 MG Oral Tab Take 1 tablet (10 mg total) by mouth daily with breakfast.                 Discharge Diet: General diet    Discharge Activity: As tolerated       Discharge  Medications        START taking these medications        Instructions Prescription details   methocarbamol 750 MG Tabs  Commonly known as: Robaxin      Take 1 tablet (750 mg total) by mouth 3 (three) times daily for 10 days.   Stop taking on: March 9, 2024  Quantity: 30 tablet  Refills: 0            CHANGE how you take these medications        Instructions Prescription details   HYDROcodone-acetaminophen 5-325 MG Tabs  Commonly known as: Norco  What changed:   when to take this  reasons to take this      Take 1 tablet by mouth every 6 (six) hours as needed for Pain.   Stop taking on: March 6, 2024  Quantity: 28 tablet  Refills: 0            CONTINUE taking these medications        Instructions Prescription details   acetaminophen 500 MG Tabs  Commonly known as: Tylenol Extra Strength      Take 2 tablets (1,000 mg total) by mouth every 6 (six) hours as needed for Pain.   Stop taking on: March 4, 2024  Quantity: 56 tablet  Refills: 0     ALPRAZolam 0.25 MG Tabs  Commonly known as: Xanax      Take 1 tablet (0.25 mg total) by mouth 2 (two) times daily as needed.   Refills: 0     amoxicillin 500 MG Tabs  Commonly known as: AMOXIL       Refills: 0     buPROPion  MG Tb24  Commonly known as: Wellbutrin XL      Take 1 tablet (300 mg total) by mouth before breakfast.   Refills: 0     rizatriptan 10 MG Tbdp  Commonly known as: Maxalt-MLT      TAKE 1 TABLET BY MOUTH ONCE AS NEEDED FOR FOR UP TO 1 DOSE. MAY REPEAT AFTER 1 HOUR   Refills: 0     venlafaxine ER 37.5 MG Cp24  Commonly known as: Effexor-XR      Take 1 capsule (37.5 mg total) by mouth daily.   Refills: 0     vilazodone 10 MG Tabs  Commonly known as: Viibryd      Take 1 tablet (10 mg total) by mouth daily with breakfast.   Refills: 0            STOP taking these medications      celecoxib 100 MG Caps  Commonly known as: CeleBREX        cyclobenzaprine 10 MG Tabs  Commonly known as: Flexeril        cyclobenzaprine 5 MG Tabs  Commonly known as: Flexeril         gabapentin 300 MG Caps  Commonly known as: Neurontin        ibuprofen 600 MG Tabs  Commonly known as: Motrin        naproxen 500 MG Tabs  Commonly known as: Naprosyn        predniSONE 10 MG Tabs  Commonly known as: Deltasone                  Where to Get Your Medications        These medications were sent to [id number] - [place] - [address] AT [facility], [phone], [phone] [address], [facility] [address]      Phone: [phone]   HYDROcodone-acetaminophen 5-325 MG Tabs  methocarbamol 750 MG Tabs         Follow up:      Follow-up Information       Hang Rice PA-C. Schedule an appointment as soon as possible for a visit in 2 week(s).    Specialty: Physician Assistant  Why: For wound re-check  Contact information:  [address]  [facility] [id number] [phone]               Dann Smith Follow up in 1 week(s).    Specialty: Family Practice  Contact information:  [address]  [place] [address] [phone]                             Follow up Labs and imaging:         Other Discharge Instructions:         Post- Operative Instructions- Lumbar:    Activity-     Avoid bending/twisting at the waist, reaching overhead, lifting more than 5-10 pounds and pushing/pulling more than 10-15 pounds. Log roll to get into and out of bed, instructions attached.    Avoid driving following your procedure. We will make driving recommendations at the time of your post-operative visit 2 weeks following your surgery. Do not drive while taking narcotic pain medication.     Avoid prolonged sitting/standing. Repositioning yourself frequently will prevent stiffness and promote blood flow which decreases your risk for a blood clot.     Avoid NSAID (Non-steroidal Anti-inflammatory Drugs] following your procedure unless approved by your provider. These medications include: Ibuprofen, Aleve, Advil, Meloxicam, Diclofenac,  Celebrex.    Take pain medications as needed per instructions. Pain medications may cause constipation. If you experience constipation: drink plenty of water, increase your activity as tolerated and take an over-the-counter stool softener daily as needed.      Specific shower recommendations are listed below under “Incision” and are determined by your surgical closure type. All patients should avoid baths, swimming, hot tubs. Once it is safe for you to shower, do not allow water to spray directly on your incision site. Do not scrub or wash your incision. Gently pat dry following shower and avoid any lotions/creams/perfumes near your surgical site.    If instrumentation was placed during your procedure (cage, screws, plate) we recommend that you take antibiotics prior to any dental procedures for 12 months following your surgery. Antibiotic prescriptions are written and managed by your dentist.      Incision-    With clean hands, remove any surgical dressing 3 days following your procedure. A surgical dressing consists of gauze/large tape. Do not remove any glue or thin steri-strips. Staples and non-dissolvable sutures will be removed at your two-week post-operative appointment in our clinic.    Surgical Glue will gradually come off on its own. Do not rub or pick at the glue. Surgical glue may darken over time. That is normal. You may shower the day after your procedure.    Steri-Strips are a sterile adhesive which appear tape-like. They are expected to fall off on their own over time. Do not pick at strips. You may shower 4 days after your procedure.      When to contact the clinic:    Drainage at your incision site. It is normal for your surgical dressing to have dried drainage following your procedure. If your incision is consistently leaking new drainage of any kind (bloody, clear, green/yellow tinged) please contact our office to notify your provider.     Changes in your incision site such as increased redness,  swelling or warmth to the touch.     Leg pain that is associated with redness, swelling or warmth to the touch.     Bowel or bladder changes including incontinence/inability to make it to the bathroom in time.     New pain If you were experiencing pain prior to surgery, you may experience fluctuating pain levels in the same location as your nerves heal post-operatively. If you experience new pain in a location where you did not have pain prior to surgery, please contact the office to notify your provider.    New or worsening weakness in arms or legs    Fever above 101 degrees Fahrenheit It is normal for your body temperature to increase slightly following a surgical procedure. Please notify your physician if your temperature is above 101 degrees Fahrenheit.          Time spent:  > 30 minutes    ANDREINA JOHNSON MD  2/29/2024

## 2024-02-29 NOTE — PROGRESS NOTES
Pt received from PACU. Alert and oriented. Dermabond in place to lower back. Gel ice in place. Tender calves, MD aware, robaxin provided. Will  continue to monitor. Check void. Norco available prn for pain.    Plan is PT/OT tomorrow.

## 2024-02-29 NOTE — OCCUPATIONAL THERAPY NOTE
02/29/24 0917   VISIT TYPE   OT Inpatient Visit Type  Attempted Evaluation  This OT attempted initial eval; however, pt reported that pt had similar sx 3 months ago and knows spine precautions and how to complete ADL. DC OT per pt's request.   Patient Background   Precautions Spine   Fall Risk Standard fall risk   Weight Bearing Restriction None   OT Device Recommendations None   Home Situation   Lives With Spouse;Daughter; 2 daughters   Pain   Rating 3   Location Low back   Management Techniques  Pt has ice pack   OT Follow Up   Charge Missed visit   Follow Up Needed (Documentation Required) No       Jennifer Miles OTR/L  Occupational Therapy  [facility]

## 2024-02-29 NOTE — PLAN OF CARE
No acute changes overnight. Pain being managed with norco and robaxin. Lower back incision, C/D/I. Gel ice packs in place. Up with standby assist. Voiding freely. Tolerating diet without nausea/vomiting. SCDs in place. Plan to discharge home when medically stable.    Problem: Patient Centered Care  Goal: Patient preferences are identified and integrated in the patient's plan of care  Description: Interventions:  - What would you like us to know as we care for you?   - Provide timely, complete, and accurate information to patient/family  - Incorporate patient and family knowledge, values, beliefs, and cultural backgrounds into the planning and delivery of care  - Encourage patient/family to participate in care and decision-making at the level they choose  - Honor patient and family perspectives and choices  Outcome: Progressing     Problem: Patient/Family Goals  Goal: Patient/Family Long Term Goal  Description: Patient's Long Term Goal: to go home    Interventions:  - Work with PT  - Pain management   - Medications as ordered   - See additional Care Plan goals for specific interventions  Outcome: Progressing  Goal: Patient/Family Short Term Goal  Description: Patient's Short Term Goal: for my pain to be a 5 or less    Interventions:   - Pain medications as needed   - Nonpharmacologic interventions   - See additional Care Plan goals for specific interventions  Outcome: Progressing     Problem: PAIN - ADULT  Goal: Verbalizes/displays adequate comfort level or patient's stated pain goal  Description: INTERVENTIONS:  - Encourage pt to monitor pain and request assistance  - Assess pain using appropriate pain scale  - Administer analgesics based on type and severity of pain and evaluate response  - Implement non-pharmacological measures as appropriate and evaluate response  - Consider cultural and social influences on pain and pain management  - Manage/alleviate anxiety  - Utilize distraction and/or relaxation  techniques  - Monitor for opioid side effects  - Notify MD/LIP if interventions unsuccessful or patient reports new pain  - Anticipate increased pain with activity and pre-medicate as appropriate  Outcome: Progressing     Problem: SKIN/TISSUE INTEGRITY - ADULT  Goal: Incision(s), wounds(s) or drain site(s) healing without S/S of infection  Description: INTERVENTIONS:  - Assess and document risk factors for pressure ulcer development  - Assess and document skin integrity  - Assess and document dressing/incision, wound bed, drain sites and surrounding tissue  - Implement wound care per orders  - Initiate isolation precautions as appropriate  - Initiate Pressure Ulcer prevention bundle as indicated  Outcome: Progressing     Problem: Impaired Activities of Daily Living  Goal: Achieve highest/safest level of independence in self care  Description: Interventions:  - Assess ability and encourage patient to participate in ADLs to maximize function  - Promote sitting position while performing ADLs such as feeding, grooming, and bathing  - Educate and encourage patient/family in tolerated functional activity level and precautions during self-care  Outcome: Progressing

## 2024-02-29 NOTE — PHYSICAL THERAPY NOTE
PHYSICAL THERAPY EVALUATION - INPATIENT     Room Number: Room 7/Room 7-A  Evaluation Date: 2024  Type of Evaluation: Initial        Presenting Problem: lumbar microdiscectomy redo     Reason for Therapy: Mobility Dysfunction and Discharge Planning    PHYSICAL THERAPY ASSESSMENT   Patient is a 38 year old male admitted 2024 for lumbar microdiscectomy redo.  Prior to admission, patient's baseline is to go home.  Patient is currently functioning near baseline with bed mobility, transfers, and gait.  Patient is requiring supervision as a result of the following impairments: pain.  Physical Therapy will continue to follow for duration of hospitalization.    Patient will benefit from return home.    PLAN      Patient has been evaluated and presents with no skilled Physical Therapy needs at this time.  Patient will be discharged from Physical Therapy services. Please re-order if a new functional limitation presents during this admission.       PHYSICAL THERAPY MEDICAL/SOCIAL HISTORY   History related to current admission:      Problem List  Principal Problem:    Lumbar disc herniation  Active Problems:    S/P lumbar microdiscectomy      HOME SITUATION  Home Situation  Lives With: Family     Prior Level of [place]: ind    SUBJECTIVE  \"I'm nauseated\"    PHYSICAL THERAPY EXAMINATION   OBJECTIVE  Precautions: Spine  Fall Risk: Standard fall risk    WEIGHT BEARING RESTRICTION  Weight Bearing Restriction: None                PAIN ASSESSMENT  Ratin          COGNITION  Overall Cognitive Status:  WFL - within functional limits    RANGE OF MOTION AND STRENGTH ASSESSMENT  Upper extremity ROM and strength are within functional limits   Lower extremity ROM is within functional limits   Lower extremity strength is within functional limits     BALANCE  Static Sitting: Good  Dynamic Sitting: Good  Static Standing: Good  Dynamic Standing: Good    AM-PAC '6-Clicks' INPATIENT SHORT FORM - BASIC MOBILITY  How much  difficulty does the patient currently have...  Patient Difficulty: Turning over in bed (including adjusting bedclothes, sheets and blankets)?: None   Patient Difficulty: Sitting down on and standing up from a chair with arms (e.g., wheelchair, bedside commode, etc.): None   Patient Difficulty: Moving from lying on back to sitting on the side of the bed?: None   How much help from another person does the patient currently need...   Help from Another: Moving to and from a bed to a chair (including a wheelchair)?: None   Help from Another: Need to walk in hospital room?: None   Help from Another: Climbing 3-5 steps with a railing?: None     AM-PAC Score:  Raw Score: 24   Approx Degree of Impairment: 0%   Standardized Score (AM-PAC Scale): 61.14   CMS Modifier (G-Code): CH    FUNCTIONAL ABILITY STATUS  Functional Mobility/Gait Assessment  Gait Assistance: Independent  Distance (ft): 200  Assistive Device: None  Rolling: ind  Supine to Sit: Ind  Sit to Supine: ind  Sit to Stand: Ind  Stairs: ind    Exercise/Education Provided:  Bed mobility  Gait training  Transfer training  Stairs  Spice precautions      The patient's Approx Degree of Impairment: 0% has been calculated based on documentation in the [facility] '6 clicks' Inpatient Basic Mobility Short Form.  Research supports that patients with this level of impairment may benefit from home. Final disposition will be made by interdisciplinary medical team.    Patient End of Session: Up in chair    Patient Evaluation Complexity Level:  History Low - no personal factors and/or co-morbidities   Examination of body systems Low -  addressing 1-2 elements   Clinical Presentation Low- Stable   Clinical Decision Making  Low Complexity     Gait Training: 10 minutes

## 2024-03-01 NOTE — PAYOR COMM NOTE
--------------  REQUEST FOR RECONSIDERATION    ADMISSION REVIEW     Payor: RAJAN SUÁREZ  Subscriber #:  [id number]  Authorization Number: [id number]    Admit date: 2/27/24  Admit time:  3:51 PM       Patient Seen in: [facility] Emergency Department    History     Chief Complaint   Patient presents with    Back Pain     38-year-old male with chronic low back with multiple ED presentations during the last 2 weeks who reports chronic worsening low back pain, denies bowel or bladder incontinence or any focal weakness or numbness or paresthesias.  No fevers.    History reviewed.   Past Medical History:   Diagnosis Date    Eye infection, right      History reviewed.   Past Surgical History:   Procedure Laterality Date    HAND/FINGER SURGERY UNLISTED  2008     Physical Exam     ED Triage Vitals   BP 02/27/24 1029 (!) 135/96   Pulse 02/27/24 1029 94   Resp 02/27/24 1029 18   Temp 02/27/24 1029 97.8 °F (36.6 °C)   Temp src 02/27/24 1029 Temporal   SpO2 02/27/24 1207 99 %   O2 Device 02/27/24 1207 None (Room air)     Physical Exam  .   Back: Bilateral hip flexion, knee extension, plantarflexion 5 out of 5 strength.  Bilateral sensation intact to light touch throughout the lower extremities.  No midline lumbar ttp.  There is right paraspinal lumbar tenderness.      Labs Reviewed   BASIC METABOLIC PANEL (8)   CBC WITH DIFFERENTIAL WITH PLATELET     MRI SPINE LUMBAR       1. L4-L5:  Postoperative changes of right L4-L5 hemilaminectomy and discectomy are again identified.  Small residual or recurrent right subarticular zone disc protrusion versus granulation tissue, which results in mild-to-moderate right lateral recess stenosis and slight effacement of the traversing right L5 nerve root.  This finding is very similar in comparison to comparison postoperative lumbar MR from 12 days prior.  Stable additional mild spinal canal and mild right neural foraminal stenosis at this level.  2. There is a 3.5 x 2.2 cm fluid collection  within the right paramedian posterior soft tissues at L4-L5, which has decreased in size since prior.  This likely relates to a resolving postoperative seroma, but superinfection cannot be excluded. 3. Edema at the right L4-L5 endplates, right L5 pedicle, and right greater than left posterior paraspinous musculature at L4-L5.  Differential considerations include sequelae of prior operative intervention, degenerative stress response, or altered biomechanics. 4. Remaining lumbar levels do not demonstrate significant degenerative changes or neural compromise.     ED Medications Administered:   Medications   ketorolac (Toradol) 30 MG/ML injection 30 mg (30 mg Intramuscular Given 2/27/24 1216)   HYDROcodone-acetaminophen (Norco) 5-325 MG per tab 1 tablet (1 tablet Oral Given 2/27/24 1216)         Medical Decision Making      Differential Diagnosis/ Diagnostic Considerations: Cord compression, cauda equina, epidural abscess, severe radiculopathy    Complicating Factors: The patient already has does not have any pertinent problems on file. to contribute to the complexity of this ED evaluation.    I reviewed prior chart records including telephone communication note prompting referral to the emergency department.  Patient here without clinical evidence of cord compression cauda equina or epidural abscess, MRI with disc herniation and after discussion with , will plan for admission for operative intervention tomorrow, pain controlled with meds in the ED. labs ordered at time of admission.  Admitted to and discussed with .   Disposition and Plan     Clinical Impression:  1. Lumbar disc herniation      Hospital Problems       Present on Admission  Date Reviewed: 2/27/2024            ICD-10-CM Noted POA    Lumbar disc herniation M51.26 2/27/2024 Unknown            History and Physical        CHIEF COMPLAINT:  Intractable right L5 radiculopathy.     HISTORY OF PRESENT ILLNESS:  The patient is a 38-year-old   male who was hospitalized in November 2023 and had right hemilaminectomy for severe right L5 radiculopathy from herniated disc.  The patient was doing well up until 2 weeks ago and started developing pain again radiating down from his right lumbar area toward the dorsum of his right foot.  No significant muscle weakness.  Pain has been intense to the point that he had been evaluated in the emergency room multiple times, twice in the last 2 weeks.  Today, he came back with recurrent pain.  He had an MRI scan of the lumbar spine which showed post L4-L5 hemilaminectomy with some small residual or recurrent right subarticular zone disc protrusion versus granulation tissue which results in mild to moderate right lateral recess stenosis and slight effacement of the traversing right L5 nerve root.  His spine surgeon, Dr. Dinero, recommended that he stay in the hospital for right L4-L5 laminectomy revision tomorrow.    REVIEW OF SYSTEMS: Pain radiates from the right lumbar area down the right leg to the dorsum of the right foot. Sometimes his right foot wiggles but no clear muscle weakness. No dysuria. No urine frequency. No urine incontinence. No change in bowel movements. Other 12-point review of systems negative       ASSESSMENT AND PLAN:  Right lumbar L5 radiculopathy as mentioned above with recurrent either disc protrusion or scar tissue.  The patient will be admitted to general medical floor.  Pain control.  N.p.o. after midnight.  Right hemilaminectomy L4-L5 revision tomorrow per Neurosurgery, Dr. Dinero.  Further recommendations to follow.         2/28:      Blood pressure 135/86, pulse 92, temperature 98.1 °F (36.7 °C), temperature source Oral, resp. rate 16, height 5' 8\" (1.727 m), weight 166 lb 9.6 oz (75.6 kg), SpO2 98%.     Physical Exam:    General: No acute distress.   Respiratory: Clear to auscultation bilaterally. No wheezes. No rhonchi.  Cardiovascular: S1, S2. Regular rate and rhythm. No murmurs,  rubs or gallops.   Abdomen: Soft, nontender, nondistended.  Positive bowel sounds. No rebound or guarding.  Neurologic: No focal neurological deficits.   Musculoskeletal: Moves all extremities.  Extremities: No edema.     Lab Results   Component Value Date     WBC 7.2 02/28/2024     HGB 15.7 02/28/2024     HCT 45.6 02/28/2024     .0 02/28/2024     CREATSERUM 1.00 02/28/2024     BUN 22 02/28/2024      02/28/2024     K 4.5 02/28/2024      02/28/2024     CO2 27.0 02/28/2024      (H) 02/28/2024     CA 9.6 02/28/2024     Lumbar disc herniation  - Neurosurgery on board  - NPO  - Right hemilaminectomy of L4-L5 revision today  - pain control  - PT/OT tomorrow  - labs in AM.      OPERATIVE REPORT     PATIENT NAME: Byron Garcia     DATE OF OPERATION: 2/28/2024      PREOPERATIVE DIAGNOSIS:  1. Lumbar spondylosis with radiculopathy                                                             2. Lumbar disc re- herniation L4-5     POSTOPERATIVE DIAGNOSIS: 1. same                                                               2. same     OPERATIVE PROCEDURE:     1.  Lumbar 4 through 5 laminectomy and 4 through 5 right medial facetectomy, foraminotomy and redo microdiscectomy.   2.  Application of 40 mg of epidural DepoMedrol at each lumbar 4 through 5.   3.  Use of intraoperative microscope and microsurgical technique.     COMPLICATIONS: none      CPT CODES: 92426 62323-51x1, 35286-59, 15840-11        2/29:    Discharge Summary        Date of Admission: 2/27/2024     Date of Discharge: 2/29/2024     Hospital Discharge Diagnoses: Acute Lumbar Disc Herniation      Admitting Diagnosis: Lumbar disc herniation [M51.26]     Disposition: Home

## 2024-03-07 ENCOUNTER — OFFICE VISIT (OUTPATIENT)
Facility: CLINIC | Age: 39
End: 2024-03-07
Payer: COMMERCIAL

## 2024-03-07 VITALS
WEIGHT: 165 LBS | HEIGHT: 68 IN | DIASTOLIC BLOOD PRESSURE: 81 MMHG | BODY MASS INDEX: 25.01 KG/M2 | HEART RATE: 92 BPM | SYSTOLIC BLOOD PRESSURE: 137 MMHG

## 2024-03-07 DIAGNOSIS — Z98.890 STATUS POST LUMBAR MICRODISCECTOMY: Primary | ICD-10-CM

## 2024-03-07 NOTE — PROGRESS NOTES
[facility] Neurosurgery        [facility]      [address]  [address]  [place] [address]    PHONE  [phone]          FAX  [phone]    [email or web address]      OFFICE FOLLOW-UP NOTE            Byron Garcia    : 1985    MRN: [id number]  CSN: [id number]      PCP: RAJIV CALVILLO  Referring Provider: No ref. provider found    Insurance: Payor: [facility] PPO / Plan: BCBS PPO / Product Type: PPO /           Date of Visit: 3/7/2024    Reason for Visit:   Chief Complaint    Post-Op                         History of Present Care:  Byron Garcia is a a(n) 38 year old, male presents to the office 1 week status post redo L4-5 MLD.  Patient is doing very well today.  He endorses some low back discomfort.  He denies any radiating lower extremity pain, tingling, numbness.  He is walking without difficulty.  He is not utilizing any pain medications at this time.  Patient has had no change in his bowel or bladder.  He remains very cautious as he does not want to be dealing with an additional lumbar disc herniation.    History:   Past Medical History:   Diagnosis Date    Eye infection, right       Past Surgical History:   Procedure Laterality Date    HAND/FINGER SURGERY UNLISTED      LUMBAR SPINE SURGERY  2023    L4-5 LUMBAR LAMINECTOMY, RESECTION OF DISC      Family History   Problem Relation Age of Onset    Cancer Father 62        Bladder      Social History     Socioeconomic History    Marital status:      Spouse name: Not on file    Number of children: Not on file    Years of education: Not on file    Highest education level: Not on file   Occupational History    Not on file   Tobacco Use    Smoking status: Every Day     Packs/day: .5     Types: Cigarettes    Smokeless tobacco: Never   Vaping Use    Vaping Use: Never used   Substance and Sexual Activity    Alcohol use: Yes    Drug use: Never    Sexual  activity: Not on file   Other Topics Concern    Not on file   Social History Narrative    Not on file     Social Determinants of Health     Financial Resource Strain: Not on file   Food Insecurity: No Food Insecurity (2/27/2024)    Food Insecurity     Food Insecurity: Never true   Transportation Needs: No Transportation Needs (2/27/2024)    Transportation Needs     Lack of Transportation: No   Physical Activity: Not on file   Stress: Not on file   Social Connections: Not on file   Housing Stability: Low Risk  (2/27/2024)    Housing Stability     Housing Instability: No     Housing Instability Emergency: Not on file        Allergies:  No Known Allergies      Medications:  Current Outpatient Medications   Medication Sig Dispense Refill    ALPRAZolam 0.25 MG Oral Tab Take 1 tablet (0.25 mg total) by mouth 2 (two) times daily as needed.      buPROPion  MG Oral Tablet 24 Hr Take 1 tablet (300 mg total) by mouth before breakfast.          Review of Systems:  A 10-point system was reviewed.  Pertinent positives and negatives are noted in HPI.      Physical Exam:  /81 (BP Location: Left arm, Patient Position: Sitting, Cuff Size: adult)   Pulse 92   Ht 68\"   Wt 165 lb (74.8 kg)   BMI 25.09 kg/m²         Neurological Exam:    Motor Strength:  Gait within normal limits  Heel/toe walk normal  5 out of 5 in bilateral lower extremities throughout    Sensation to light touch:  Intact in bilateral lower extremities    Incision:  Clean, dry, intact.  No erythema, tenderness, swelling.      Abdomen:  Soft, non-distended, non-tender, with no rebound or guarding.  No peritoneal signs.     Extremities:  Non-tender, no lower extremity edema noted.      Labs:  CBC:  Lab Results   Component Value Date    WBC 19.2 (H) 02/29/2024    HGB 17.0 02/29/2024    HCT 47.9 02/29/2024    MCV 90.7 02/29/2024    .0 02/29/2024      BMG:  Lab Results   Component Value Date     02/29/2024    K 4.8 02/29/2024    CO2 30.0  02/29/2024     02/29/2024    BUN 15 02/29/2024      INR:  No results found for: \"INR\", \"PROTIME\"       Diagnostics:  None    Diagnosis:  1. Status post lumbar microdiscectomy  - PHYSICAL THERAPY EXTERNAL      Assessment/Plan:  Byron Garcia returns to the office 1 week status post L4-5 redo microdiscectomy.  Patient is doing very well at this time.  We discussed continuing restrictions in the form of limiting bending/lifting/twisting.  Patient would like to be less aggressive than his postop rehabilitation and will wait a few weeks before initiating physical therapy.  He is no longer using any pain medications at this time.  Will plan to see patient back in the office in 4 to 5 weeks after initiating physical therapy.    More than 30 minutes were spent during this visit and the coordination of this patient's care. All questions and concerns were addressed. We appreciate the opportunity to participate in the care of this patient. Please do not hesitate to call our office ([phone]) with any issues.    This note has been dictated utilizing voice recognition software. Unfortunately, this may lead to occasional typographical errors. If there are any questions regarding this, please do not hesitate to contact our office.           Hang Rice PA-C    3/7/2024  10:48 AM

## 2024-03-21 ENCOUNTER — OFFICE VISIT (OUTPATIENT)
Facility: CLINIC | Age: 39
End: 2024-03-21
Payer: COMMERCIAL

## 2024-03-21 ENCOUNTER — OFFICE VISIT (OUTPATIENT)
Dept: PHYSICAL MEDICINE AND REHAB | Facility: CLINIC | Age: 39
End: 2024-03-21
Payer: COMMERCIAL

## 2024-03-21 VITALS — WEIGHT: 165 LBS | HEIGHT: 68 IN | RESPIRATION RATE: 18 BRPM | BODY MASS INDEX: 25.01 KG/M2

## 2024-03-21 VITALS
SYSTOLIC BLOOD PRESSURE: 126 MMHG | HEART RATE: 98 BPM | BODY MASS INDEX: 25 KG/M2 | DIASTOLIC BLOOD PRESSURE: 79 MMHG | WEIGHT: 165 LBS

## 2024-03-21 DIAGNOSIS — M54.16 CHRONIC RIGHT-SIDED LUMBAR RADICULOPATHY: Primary | ICD-10-CM

## 2024-03-21 DIAGNOSIS — Z98.890 STATUS POST LUMBAR MICRODISCECTOMY: Primary | ICD-10-CM

## 2024-03-21 PROCEDURE — 3074F SYST BP LT 130 MM HG: CPT | Performed by: NEUROLOGICAL SURGERY

## 2024-03-21 PROCEDURE — 3078F DIAST BP <80 MM HG: CPT | Performed by: NEUROLOGICAL SURGERY

## 2024-03-21 PROCEDURE — 99024 POSTOP FOLLOW-UP VISIT: CPT | Performed by: NEUROLOGICAL SURGERY

## 2024-03-21 RX ORDER — METHYLPREDNISOLONE 4 MG/1
TABLET ORAL
Qty: 1 EACH | Refills: 0 | Status: SHIPPED | OUTPATIENT
Start: 2024-03-21

## 2024-03-21 RX ORDER — GABAPENTIN 300 MG/1
CAPSULE ORAL
Qty: 90 CAPSULE | Refills: 0 | Status: SHIPPED | OUTPATIENT
Start: 2024-03-21

## 2024-03-21 NOTE — PROGRESS NOTES
[facility] Neurosurgery        [facility] for [facility]      [address]  [address]  [place] [address]    PHONE  [phone]          FAX  [phone]    [email or web address]      OFFICE FOLLOW-UP NOTE            Byron Garcia    : 1985    MRN: [id number]  CSN: [id number]      PCP: RAJIV CALVILLO  Referring Provider: No ref. provider found    Insurance: Payor: [facility] PPO / Plan: BCBS PPO / Product Type: PPO /           Date of Visit: 3/21/2024    Reason for Visit:   Chief Complaint    Follow - Up                         History of Present Care:  Byron Garcia is a a(n) 38 year old, male who returns to the office 3 weeks status post redo L4-5 MLD.  Patient feels he has significantly worsened in the last week.  He endorses right-sided low back pain with right-sided buttocks pain.  He states he is having difficulty getting in and out of the car and sitting due to the right buttocks pain as well as pain with weightbearing on the right leg. He feels hip flexion causes the pain to worsen.  He is now utilizing Norco 10 mg and ibuprofen for the pain.  He denies lower extremity radiation or weakness, bowel or bladder changes.    History:  Past Medical History:   Diagnosis Date    Eye infection, right       Past Surgical History:   Procedure Laterality Date    HAND/FINGER SURGERY UNLISTED      LUMBAR SPINE SURGERY  2023    L4-5 LUMBAR LAMINECTOMY, RESECTION OF DISC      Family History   Problem Relation Age of Onset    Cancer Father 62        Bladder      Social History     Socioeconomic History    Marital status:      Spouse name: Not on file    Number of children: Not on file    Years of education: Not on file    Highest education level: Not on file   Occupational History    Not on file   Tobacco Use    Smoking status: Every Day     Packs/day: .5     Types: Cigarettes    Smokeless tobacco: Never   Vaping Use    Vaping  Use: Never used   Substance and Sexual Activity    Alcohol use: Yes    Drug use: Never    Sexual activity: Not on file   Other Topics Concern    Not on file   Social History Narrative    Not on file     Social Determinants of Health     Financial Resource Strain: Not on file   Food Insecurity: No Food Insecurity (2/27/2024)    Food Insecurity     Food Insecurity: Never true   Transportation Needs: No Transportation Needs (2/27/2024)    Transportation Needs     Lack of Transportation: No   Physical Activity: Not on file   Stress: Not on file   Social Connections: Not on file   Housing Stability: Low Risk  (2/27/2024)    Housing Stability     Housing Instability: No     Housing Instability Emergency: Not on file        Allergies:  No Known Allergies      Medications:  Current Outpatient Medications   Medication Sig Dispense Refill    ALPRAZolam 0.25 MG Oral Tab Take 1 tablet (0.25 mg total) by mouth 2 (two) times daily as needed.      buPROPion  MG Oral Tablet 24 Hr Take 1 tablet (300 mg total) by mouth before breakfast.          Review of Systems:  A 10-point system was reviewed.  Pertinent positives and negatives are noted in HPI.      Physical Exam:  /79 (BP Location: Left arm, Patient Position: Standing, Cuff Size: adult)   Pulse 98   Wt 165 lb (74.8 kg)   BMI 25.09 kg/m²         Neurological Exam:    Straight leg raise negative bilaterally    Motor Strength:  Strength in the lower extremities is 5 out of 5 with encouragement    Sensation to light touch:  Intact in bilateral lower extremities throughout    Incision:  Clean, dry, intact      Abdomen:  Soft, non-distended, non-tender, with no rebound or guarding.  No peritoneal signs.     Extremities:  Non-tender, no lower extremity edema noted.      Labs:  CBC:  Lab Results   Component Value Date    WBC 19.2 (H) 02/29/2024    HGB 17.0 02/29/2024    HCT 47.9 02/29/2024    MCV 90.7 02/29/2024    .0 02/29/2024      BMG:  Lab Results   Component  Value Date     02/29/2024    K 4.8 02/29/2024    CO2 30.0 02/29/2024     02/29/2024    BUN 15 02/29/2024      INR:  No results found for: \"INR\", \"PROTIME\"       Diagnostics:  None to review    Diagnosis:  1. Status post lumbar microdiscectomy      Assessment/Plan:  Byron Garcia presents office 3 weeks status post redo L4-5 MLD.  He presents as acutely worsened today with right lower extremity buttocks pain.  He feels this started in the last week and he is not having difficulty weightbearing and sitting.  We would like for him to be evaluated by our colleague Dr. Patric Pittman for possible steroid injection.  Will also provide him with a referral to physical therapy.  Will plan to see him back in the office in 3 weeks.      More than 30 minutes were spent during this visit and the coordination of this patient's care. All questions and concerns were addressed. We appreciate the opportunity to participate in the care of this patient. Please do not hesitate to call our office ([phone]) with any issues.    This note has been dictated utilizing voice recognition software. Unfortunately, this may lead to occasional typographical errors. If there are any questions regarding this, please do not hesitate to contact our office.           Hang Rice PA-C    3/21/2024  9:35 AM

## 2024-03-21 NOTE — PATIENT INSTRUCTIONS
-MRI of the lumbar spine   -Ice/Heat as tolerated  -Medrol dose pack  -Gabapentin 300mg three times daily  -Start PT and home exercises

## 2024-03-22 ENCOUNTER — TELEPHONE (OUTPATIENT)
Dept: PHYSICAL MEDICINE AND REHAB | Facility: CLINIC | Age: 39
End: 2024-03-22

## 2024-03-22 ENCOUNTER — HOSPITAL ENCOUNTER (OUTPATIENT)
Dept: MRI IMAGING | Age: 39
Discharge: HOME | End: 2024-03-22
Attending: PHYSICAL MEDICINE & REHABILITATION
Payer: COMMERCIAL

## 2024-03-22 DIAGNOSIS — M54.16 CHRONIC RIGHT-SIDED LUMBAR RADICULOPATHY: ICD-10-CM

## 2024-03-22 PROCEDURE — 72148 MRI LUMBAR SPINE W/O DYE: CPT | Performed by: PHYSICAL MEDICINE & REHABILITATION

## 2024-03-22 NOTE — TELEPHONE ENCOUNTER
Patient's wife (Christen) called. Patient was referred by  Dr. Dinero.  Had second surgery & having nerve pain. Dr. Dinero suggested an injection with Dr. Pittman.  Patient's wife reported that upon examination, Dr. Pittman wanted to get some diagnostics and try another option first.     Started Gabapentin 300 mg last night and took all his pain away.  Having breakthrough pain this morning. Patient's spouse (Christen) asked if it would be ok for the patient to try adding the second dose in the morning now.  This RN advised as long as the patient is not having troublesome side effects from the first dose, he is welcome to try the second dose, and if that produces any side effects he can taper back, give it a few days and then try the second dose again.      Patient's wife asked if they should start the steroid pack or wait since the gabapentin is helping so much.  This RN advised the steroid pack would address any inflammation present, and that inflammation can affect the nerves as well if compresses them, so if that was part of his plan, it is recommended to take.     Patient's wife reported that the patient is getting his MRI performed right now.    This RN advised since it was ordered STAT, Dr. Pittman will be notified of the results right away, and if it is anything urgent, he would contact them, otherwise they would hear from our office on Monday.    Patient's spouse was very complimentary of Dr. Pittman and his approach to the patient's pain and symptoms. She was very appreciative of everything and being seen right away and responded to right away.      Spouse reported patient has had a lot of anxiety, especially since the surgery, and she is very grateful that Dr. Pittman has put his mind at ease a little, as he was nervous that a third surgery was inevitable.

## 2024-03-24 NOTE — PROGRESS NOTES
[facility] NEUROSCIENCE [place]  NEW PATIENT EVALUATION    Consultation as a request of Dr. Dinero      HISTORY OF PRESENT ILLNESS:     Chief Complaint   Patient presents with    New Patient     New L handed patient Rfd by Dr. Dinero for back pain.using Norco for pain. Rates his pain today 8/10.       The patient is a 38 year old male with significant past medical history of anxiety, lumbar disc herniation who presents with right-sided low back pain.  Patient has been referred by neurosurgery who is now status post redo of L4-5 microlaminectomy and discectomy 3 weeks ago.  Postop patient states he was doing well until 1 day 8 he started to experience right-sided low back pain again however he describes this feeling as different from his previous pain symptoms.  Pain is localized with some radiation in the groin.  He is having a difficult time sitting and has been sleeping in the recliner.  He will occasionally get a sharp shooting pain that can be very uncomfortable that makes him changes position from seated to standing.  He is currently pacing in the room given his pain complaints.  Rates it to be 8 out of 10.  Denies any significant radiation but does occasionally feel a tingle in the right lower extremity.  Denies any saddle anesthesia, any loss of bowel bladder control.  He has not had any dedicated recent PT or home exercises.  He denies any fevers or chills.  He has not had any MRI imaging postoperatively after his second surgery.  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  Their iliac crest and shoulder heights are symmetrical.     Palpation: Non tender to palpation of the spinous process.  Nontender to palpation of the  paraspinals  ROM: Severely restricted in forward flexion with reproduction of pain  Strength: 5/5 in bilateral lower extremities with the exception of some pain limited weakness  Sensation: Intact to light touch in all dermatomes of the lower extremities  Reflexes: 2/4 at L4 and S1  Facet Loading: no specific facet pain  Straight leg raise: negative for radicular pain symptoms  Slump test: negative for pain symptoms for radicular pain symptoms      IMAGING:     MRI lumbar spine completed on 3/22/2024 was personally reviewed which is notable for an elongated fluid collection at the L4 level extending from the superficial soft tissues to the L4 spinous process to the right paraspinous soft tissues which compared to the previous imaging does look somewhat smaller.  There are postoperative changes of a right L4-5 hemilaminectomy with residual right paracentral disc protrusion with moderate right lateral recess stenosis.    All imaging results were reviewed and discussed with patient.      ASSESSMENT/PLAN:     1. Chronic right-sided lumbar radiculopathy        Byron Garcia is a 38-year-old male who is now status post redo of a right L4-5 microlaminectomy discectomy surgery with reaggravation of his symptoms again.  His MRI imaging does show a persistent right paracentral disc protrusion with moderate lateral recess and foraminal narrowing.  I have recommended a right L4 and L5 transforaminal epidural steroid injection under fluoroscopy guidance under local anesthesia.  I do believe that patient needs to engage in a PT program with home exercises as well which was ordered for him today.  Have also advised him use topical treatment with lidocaine patch and have prescribed him a Medrol Dosepak as well as gabapentin 300 mg to be taken 3 times daily.  He will follow-up with me 2 weeks after the epidural procedure and will continue to follow with neurosurgery as well.      The patient verbalized understanding with the  plan and was in agreement. All questions/concerns were addressed and there were no barriers to learning.  Please note Dragon dictation software was used to dictate this note and may result in inadvertent typos.    Tavon Pittman DO, FAAPMR & CAQSM  Physical Medicine and Rehabilitation  Sports and Spine Medicine    PAST MEDICAL HISTORY:     Past Medical History:   Diagnosis Date    Eye infection, right          PAST SURGICAL HISTORY:     Past Surgical History:   Procedure Laterality Date    HAND/FINGER SURGERY UNLISTED  2008    LUMBAR SPINE SURGERY  11/08/2023    L4-5 LUMBAR LAMINECTOMY, RESECTION OF DISC         CURRENT MEDICATIONS:     Current Outpatient Medications   Medication Sig Dispense Refill    gabapentin 300 MG Oral Cap Start with night time dose only. If well tolerated, increase to two times daily. If well tolerated, increase to three times daily. 90 capsule 0    methylPREDNISolone 4 MG Oral Tablet Therapy Pack As directed 1 each 0    ALPRAZolam 0.25 MG Oral Tab Take 1 tablet (0.25 mg total) by mouth 2 (two) times daily as needed.      buPROPion  MG Oral Tablet 24 Hr Take 1 tablet (300 mg total) by mouth before breakfast.           ALLERGIES:   No Known Allergies      FAMILY HISTORY:     Family History   Problem Relation Age of Onset    Cancer Father 62        Bladder          SOCIAL HISTORY:     Social History     Socioeconomic History    Marital status:    Tobacco Use    Smoking status: Every Day     Packs/day: .5     Types: Cigarettes    Smokeless tobacco: Never   Vaping Use    Vaping Use: Never used   Substance and Sexual Activity    Alcohol use: Yes    Drug use: Never     Social Determinants of Health     Food Insecurity: No Food Insecurity (2/27/2024)    Food Insecurity     Food Insecurity: Never true   Transportation Needs: No Transportation Needs (2/27/2024)    Transportation Needs     Lack of Transportation: No   Housing Stability: Low Risk  (2/27/2024)    Housing Stability     Housing  Instability: No          REVIEW OF SYSTEMS:   No patient-reported data collected this visit.      PHYSICAL EXAM:   General: No immediate distress  Head: Normocephalic/ Atraumatic  Eyes: Extra-occular movements intact.   Ears: No auricular hematoma or deformities  Mouth: No lesions or ulcerations  Heart: peripheral pulses intact. Normal capillary refill.   Lungs: Non-labored respirations  Abdomen: No abdominal guarding  Extremities: No lower extremity edema bilaterally   Skin: No lesions noted   Cognition: alert & oriented x 3, attentive, able to follow 2 step commands, comprehention intact, spontaneous speech intact  Psychiatric: Mood and affect appropriate      LABS:   No results found for: \"EAG\", \"A1C\"  Lab Results   Component Value Date    WBC 19.2 (H) 02/29/2024    RBC 5.28 02/29/2024    HGB 17.0 02/29/2024    HCT 47.9 02/29/2024    MCV 90.7 02/29/2024    MCH 32.2 02/29/2024    MCHC 35.5 02/29/2024    RDW 11.7 02/29/2024    .0 02/29/2024     Lab Results   Component Value Date     (H) 02/29/2024    BUN 15 02/29/2024    BUNCREA 14.7 02/29/2024    CREATSERUM 1.02 02/29/2024    ANIONGAP 5 02/29/2024    CA 10.1 02/29/2024    OSMOCALC 285 02/29/2024     02/29/2024    K 4.8 02/29/2024     02/29/2024    CO2 30.0 02/29/2024     No results found for: \"PTP\", \"PT\", \"INR\"  No results found for: \"VITD\", \"QVITD\", \"[id number]\"

## 2024-03-24 NOTE — H&P (VIEW-ONLY)
[facility] NEUROSCIENCE [place]  NEW PATIENT EVALUATION    Consultation as a request of Dr. Dinero      HISTORY OF PRESENT ILLNESS:     Chief Complaint   Patient presents with    New Patient     New L handed patient Rfd by Dr. Dinero for back pain.using Norco for pain. Rates his pain today 8/10.       The patient is a 38 year old male with significant past medical history of anxiety, lumbar disc herniation who presents with right-sided low back pain.  Patient has been referred by neurosurgery who is now status post redo of L4-5 microlaminectomy and discectomy 3 weeks ago.  Postop patient states he was doing well until 1 day 8 he started to experience right-sided low back pain again however he describes this feeling as different from his previous pain symptoms.  Pain is localized with some radiation in the groin.  He is having a difficult time sitting and has been sleeping in the recliner.  He will occasionally get a sharp shooting pain that can be very uncomfortable that makes him changes position from seated to standing.  He is currently pacing in the room given his pain complaints.  Rates it to be 8 out of 10.  Denies any significant radiation but does occasionally feel a tingle in the right lower extremity.  Denies any saddle anesthesia, any loss of bowel bladder control.  He has not had any dedicated recent PT or home exercises.  He denies any fevers or chills.  He has not had any MRI imaging postoperatively after his second surgery.  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  Their iliac crest and shoulder heights are symmetrical.     Palpation: Non tender to palpation of the spinous process.  Nontender to palpation of the  paraspinals  ROM: Severely restricted in forward flexion with reproduction of pain  Strength: 5/5 in bilateral lower extremities with the exception of some pain limited weakness  Sensation: Intact to light touch in all dermatomes of the lower extremities  Reflexes: 2/4 at L4 and S1  Facet Loading: no specific facet pain  Straight leg raise: negative for radicular pain symptoms  Slump test: negative for pain symptoms for radicular pain symptoms      IMAGING:     MRI lumbar spine completed on 3/22/2024 was personally reviewed which is notable for an elongated fluid collection at the L4 level extending from the superficial soft tissues to the L4 spinous process to the right paraspinous soft tissues which compared to the previous imaging does look somewhat smaller.  There are postoperative changes of a right L4-5 hemilaminectomy with residual right paracentral disc protrusion with moderate right lateral recess stenosis.    All imaging results were reviewed and discussed with patient.      ASSESSMENT/PLAN:     1. Chronic right-sided lumbar radiculopathy        Byron Garcia is a 38-year-old male who is now status post redo of a right L4-5 microlaminectomy discectomy surgery with reaggravation of his symptoms again.  His MRI imaging does show a persistent right paracentral disc protrusion with moderate lateral recess and foraminal narrowing.  I have recommended a right L4 and L5 transforaminal epidural steroid injection under fluoroscopy guidance under local anesthesia.  I do believe that patient needs to engage in a PT program with home exercises as well which was ordered for him today.  Have also advised him use topical treatment with lidocaine patch and have prescribed him a Medrol Dosepak as well as gabapentin 300 mg to be taken 3 times daily.  He will follow-up with me 2 weeks after the epidural procedure and will continue to follow with neurosurgery as well.      The patient verbalized understanding with the  plan and was in agreement. All questions/concerns were addressed and there were no barriers to learning.  Please note Dragon dictation software was used to dictate this note and may result in inadvertent typos.    Tavon Pittman DO, FAAPMR & CAQSM  Physical Medicine and Rehabilitation  Sports and Spine Medicine    PAST MEDICAL HISTORY:     Past Medical History:   Diagnosis Date    Eye infection, right          PAST SURGICAL HISTORY:     Past Surgical History:   Procedure Laterality Date    HAND/FINGER SURGERY UNLISTED  2008    LUMBAR SPINE SURGERY  11/08/2023    L4-5 LUMBAR LAMINECTOMY, RESECTION OF DISC         CURRENT MEDICATIONS:     Current Outpatient Medications   Medication Sig Dispense Refill    gabapentin 300 MG Oral Cap Start with night time dose only. If well tolerated, increase to two times daily. If well tolerated, increase to three times daily. 90 capsule 0    methylPREDNISolone 4 MG Oral Tablet Therapy Pack As directed 1 each 0    ALPRAZolam 0.25 MG Oral Tab Take 1 tablet (0.25 mg total) by mouth 2 (two) times daily as needed.      buPROPion  MG Oral Tablet 24 Hr Take 1 tablet (300 mg total) by mouth before breakfast.           ALLERGIES:   No Known Allergies      FAMILY HISTORY:     Family History   Problem Relation Age of Onset    Cancer Father 62        Bladder          SOCIAL HISTORY:     Social History     Socioeconomic History    Marital status:    Tobacco Use    Smoking status: Every Day     Packs/day: .5     Types: Cigarettes    Smokeless tobacco: Never   Vaping Use    Vaping Use: Never used   Substance and Sexual Activity    Alcohol use: Yes    Drug use: Never     Social Determinants of Health     Food Insecurity: No Food Insecurity (2/27/2024)    Food Insecurity     Food Insecurity: Never true   Transportation Needs: No Transportation Needs (2/27/2024)    Transportation Needs     Lack of Transportation: No   Housing Stability: Low Risk  (2/27/2024)    Housing Stability     Housing  Instability: No          REVIEW OF SYSTEMS:   No patient-reported data collected this visit.      PHYSICAL EXAM:   General: No immediate distress  Head: Normocephalic/ Atraumatic  Eyes: Extra-occular movements intact.   Ears: No auricular hematoma or deformities  Mouth: No lesions or ulcerations  Heart: peripheral pulses intact. Normal capillary refill.   Lungs: Non-labored respirations  Abdomen: No abdominal guarding  Extremities: No lower extremity edema bilaterally   Skin: No lesions noted   Cognition: alert & oriented x 3, attentive, able to follow 2 step commands, comprehention intact, spontaneous speech intact  Psychiatric: Mood and affect appropriate      LABS:   No results found for: \"EAG\", \"A1C\"  Lab Results   Component Value Date    WBC 19.2 (H) 02/29/2024    RBC 5.28 02/29/2024    HGB 17.0 02/29/2024    HCT 47.9 02/29/2024    MCV 90.7 02/29/2024    MCH 32.2 02/29/2024    MCHC 35.5 02/29/2024    RDW 11.7 02/29/2024    .0 02/29/2024     Lab Results   Component Value Date     (H) 02/29/2024    BUN 15 02/29/2024    BUNCREA 14.7 02/29/2024    CREATSERUM 1.02 02/29/2024    ANIONGAP 5 02/29/2024    CA 10.1 02/29/2024    OSMOCALC 285 02/29/2024     02/29/2024    K 4.8 02/29/2024     02/29/2024    CO2 30.0 02/29/2024     No results found for: \"PTP\", \"PT\", \"INR\"  No results found for: \"VITD\", \"QVITD\", \"[id number]\"

## 2024-03-25 ENCOUNTER — TELEPHONE (OUTPATIENT)
Dept: PHYSICAL MEDICINE AND REHAB | Facility: CLINIC | Age: 39
End: 2024-03-25

## 2024-03-25 DIAGNOSIS — M54.16 CHRONIC RIGHT-SIDED LUMBAR RADICULOPATHY: Primary | ICD-10-CM

## 2024-03-25 NOTE — TELEPHONE ENCOUNTER
Initiated  Right L4 and L5 Transforaminal Epidural Steroid Injection under fluoroscopy guidance under local anesthesia CPT Code: 63731,04801 & Dx: M54.16 to be done at [facility] with juan Greenfield-  Status: Approved with valid dates: 3/31/24-4/29/24  Auth#: [id number]    Patient has been scheduled for Right L4 and L5 Transforaminal Epidural Steroid Injection under fluoroscopy on 4/1/24 at the [facility] with Dr. Curran.   -Anesthesia type:  Local  -Patient was advised that if he/she does receive the covid vaccine it needs to be at least 2 weeks before or after the injection.  -Medications and allergies reviewed.  -Patient reminded to hold NSAIDs (Ibuprofen, ASA 81, Aleve, Naproxen, Mobic, Diclofenac, Etodolac, Celebrex etc.) for 3 days prior to Lumbar MBB/Facet if BMI is greater than 35. For Cervical injections only hold multivitamins, Vitamin E, Fish Oil, Phentermine/Lomaira for 7 days prior to injection and NSAIDS.   mg to be held for 7 days prior to injections.  -If patient is receiving MAC/IVCS, weight loss oral/injectable medications will need to be held for 7 days prior to injection.  -Patient informed to fast 12 hours prior to procedure with IVCS/MAC.   -If on blood thinner, clearance has been received and approved to hold this medication by provider.   -Patient informed of [facility]'s  policy:  he/she will need a  to and from procedure and must be on site for their entirety of their visit, if their ride is unable to the procedure will be cancelled.   -[facility] is located in the [facility] 1st floor,  may park in the yellow/purple parking lot.  Patient verbalized understanding and agrees with plan.  Scheduled in Epic: Yes  Scheduled in Surgical Case: Yes  Follow up appointment made: NOV: 4/15/2024 Tavon Pittman DO

## 2024-03-25 NOTE — TELEPHONE ENCOUNTER
The patient over the phone.  He was appreciative of the call.  Recommend that we pursue right L4 and L5 transforaminal epidural steroid injection.  Recommend that he follow-up after.  Recommend he continue PT and home exercises.  He is noticing improvement with gabapentin as well which I have recommended continued.  I advised the patient we will switch out to him once the injections approved.    Tavon Pittman DO, FAAPMR & CAQSM  Physical Medicine and Rehabilitation/Sports Medicine  [facility]

## 2024-03-31 ENCOUNTER — HOSPITAL ENCOUNTER (OUTPATIENT)
Facility: HOSPITAL | Age: 39
Setting detail: OBSERVATION
Discharge: HOME | End: 2024-04-01
Attending: EMERGENCY MEDICINE | Admitting: HOSPITALIST
Payer: COMMERCIAL

## 2024-03-31 ENCOUNTER — TELEPHONE (OUTPATIENT)
Dept: PHYSICAL MEDICINE AND REHAB | Facility: CLINIC | Age: 39
End: 2024-03-31

## 2024-03-31 ENCOUNTER — APPOINTMENT (OUTPATIENT)
Dept: MRI IMAGING | Facility: HOSPITAL | Age: 39
End: 2024-03-31
Attending: EMERGENCY MEDICINE
Payer: COMMERCIAL

## 2024-03-31 DIAGNOSIS — M54.59 INTRACTABLE LOW BACK PAIN: Primary | ICD-10-CM

## 2024-03-31 DIAGNOSIS — M48.061 SPINAL STENOSIS OF LUMBAR REGION WITHOUT NEUROGENIC CLAUDICATION: ICD-10-CM

## 2024-03-31 DIAGNOSIS — Z98.890 S/P LUMBAR MICRODISCECTOMY: ICD-10-CM

## 2024-03-31 DIAGNOSIS — M51.26 LUMBAR DISC HERNIATION: ICD-10-CM

## 2024-03-31 LAB
ANION GAP SERPL CALC-SCNC: 2 MMOL/L (ref 0–18)
BASOPHILS # BLD AUTO: 0.05 X10(3) UL (ref 0–0.2)
BASOPHILS NFR BLD AUTO: 0.5 %
BUN BLD-MCNC: 15 MG/DL (ref 9–23)
BUN/CREAT SERPL: 14.9 (ref 10–20)
CALCIUM BLD-MCNC: 10.2 MG/DL (ref 8.7–10.4)
CHLORIDE SERPL-SCNC: 104 MMOL/L (ref 98–112)
CO2 SERPL-SCNC: 32 MMOL/L (ref 21–32)
CREAT BLD-MCNC: 1.01 MG/DL
DEPRECATED RDW RBC AUTO: 41.4 FL (ref 35.1–46.3)
EGFRCR SERPLBLD CKD-EPI 2021: 96 ML/MIN/1.73M2 (ref 60–?)
EOSINOPHIL # BLD AUTO: 0.22 X10(3) UL (ref 0–0.7)
EOSINOPHIL NFR BLD AUTO: 2.1 %
ERYTHROCYTE [DISTWIDTH] IN BLOOD BY AUTOMATED COUNT: 12.3 % (ref 11–15)
GLUCOSE BLD-MCNC: 106 MG/DL (ref 70–99)
HCT VFR BLD AUTO: 44.9 %
HGB BLD-MCNC: 15 G/DL
IMM GRANULOCYTES # BLD AUTO: 0.06 X10(3) UL (ref 0–1)
IMM GRANULOCYTES NFR BLD: 0.6 %
LYMPHOCYTES # BLD AUTO: 2.37 X10(3) UL (ref 1–4)
LYMPHOCYTES NFR BLD AUTO: 22.7 %
MCH RBC QN AUTO: 30.7 PG (ref 26–34)
MCHC RBC AUTO-ENTMCNC: 33.4 G/DL (ref 31–37)
MCV RBC AUTO: 91.8 FL
MONOCYTES # BLD AUTO: 0.9 X10(3) UL (ref 0.1–1)
MONOCYTES NFR BLD AUTO: 8.6 %
NEUTROPHILS # BLD AUTO: 6.82 X10 (3) UL (ref 1.5–7.7)
NEUTROPHILS # BLD AUTO: 6.82 X10(3) UL (ref 1.5–7.7)
NEUTROPHILS NFR BLD AUTO: 65.5 %
OSMOLALITY SERPL CALC.SUM OF ELEC: 287 MOSM/KG (ref 275–295)
PLATELET # BLD AUTO: 240 10(3)UL (ref 150–450)
POTASSIUM SERPL-SCNC: 3.7 MMOL/L (ref 3.5–5.1)
RBC # BLD AUTO: 4.89 X10(6)UL
SODIUM SERPL-SCNC: 138 MMOL/L (ref 136–145)
WBC # BLD AUTO: 10.4 X10(3) UL (ref 4–11)

## 2024-03-31 PROCEDURE — 72148 MRI LUMBAR SPINE W/O DYE: CPT | Performed by: EMERGENCY MEDICINE

## 2024-03-31 PROCEDURE — 99223 1ST HOSP IP/OBS HIGH 75: CPT | Performed by: HOSPITALIST

## 2024-03-31 RX ORDER — MORPHINE SULFATE 4 MG/ML
4 INJECTION, SOLUTION INTRAMUSCULAR; INTRAVENOUS ONCE
Status: COMPLETED | OUTPATIENT
Start: 2024-03-31 | End: 2024-03-31

## 2024-03-31 RX ORDER — ENEMA 19; 7 G/133ML; G/133ML
1 ENEMA RECTAL ONCE AS NEEDED
Status: DISCONTINUED | OUTPATIENT
Start: 2024-03-31 | End: 2024-04-01

## 2024-03-31 RX ORDER — MORPHINE SULFATE 2 MG/ML
1 INJECTION, SOLUTION INTRAMUSCULAR; INTRAVENOUS EVERY 2 HOUR PRN
Status: DISCONTINUED | OUTPATIENT
Start: 2024-03-31 | End: 2024-04-01

## 2024-03-31 RX ORDER — GABAPENTIN 300 MG/1
300 CAPSULE ORAL NIGHTLY
Status: DISCONTINUED | OUTPATIENT
Start: 2024-03-31 | End: 2024-04-01

## 2024-03-31 RX ORDER — BISACODYL 10 MG
10 SUPPOSITORY, RECTAL RECTAL
Status: DISCONTINUED | OUTPATIENT
Start: 2024-03-31 | End: 2024-04-01

## 2024-03-31 RX ORDER — ONDANSETRON 2 MG/ML
4 INJECTION INTRAMUSCULAR; INTRAVENOUS EVERY 6 HOURS PRN
Status: DISCONTINUED | OUTPATIENT
Start: 2024-03-31 | End: 2024-04-01

## 2024-03-31 RX ORDER — ALPRAZOLAM 0.25 MG/1
0.25 TABLET ORAL 2 TIMES DAILY PRN
Status: DISCONTINUED | OUTPATIENT
Start: 2024-03-31 | End: 2024-04-01

## 2024-03-31 RX ORDER — POLYETHYLENE GLYCOL 3350 17 G/17G
17 POWDER, FOR SOLUTION ORAL DAILY PRN
Status: DISCONTINUED | OUTPATIENT
Start: 2024-03-31 | End: 2024-04-01

## 2024-03-31 RX ORDER — ONDANSETRON 2 MG/ML
4 INJECTION INTRAMUSCULAR; INTRAVENOUS ONCE
Status: COMPLETED | OUTPATIENT
Start: 2024-03-31 | End: 2024-03-31

## 2024-03-31 RX ORDER — SENNOSIDES 8.6 MG
17.2 TABLET ORAL NIGHTLY PRN
Status: DISCONTINUED | OUTPATIENT
Start: 2024-03-31 | End: 2024-04-01

## 2024-03-31 RX ORDER — MORPHINE SULFATE 2 MG/ML
2 INJECTION, SOLUTION INTRAMUSCULAR; INTRAVENOUS EVERY 2 HOUR PRN
Status: DISCONTINUED | OUTPATIENT
Start: 2024-03-31 | End: 2024-04-01

## 2024-03-31 RX ORDER — ACETAMINOPHEN 325 MG/1
650 TABLET ORAL EVERY 4 HOURS PRN
Status: DISCONTINUED | OUTPATIENT
Start: 2024-03-31 | End: 2024-04-01

## 2024-03-31 RX ORDER — PROCHLORPERAZINE EDISYLATE 5 MG/ML
5 INJECTION INTRAMUSCULAR; INTRAVENOUS EVERY 8 HOURS PRN
Status: DISCONTINUED | OUTPATIENT
Start: 2024-03-31 | End: 2024-04-01

## 2024-03-31 RX ORDER — HYDROCODONE BITARTRATE AND ACETAMINOPHEN 5; 325 MG/1; MG/1
1 TABLET ORAL EVERY 4 HOURS PRN
Status: DISCONTINUED | OUTPATIENT
Start: 2024-03-31 | End: 2024-04-01

## 2024-03-31 RX ORDER — BUPROPION HYDROCHLORIDE 300 MG/1
300 TABLET ORAL
Status: DISCONTINUED | OUTPATIENT
Start: 2024-04-01 | End: 2024-04-01

## 2024-03-31 RX ORDER — HYDROCODONE BITARTRATE AND ACETAMINOPHEN 5; 325 MG/1; MG/1
2 TABLET ORAL EVERY 4 HOURS PRN
Status: DISCONTINUED | OUTPATIENT
Start: 2024-03-31 | End: 2024-04-01

## 2024-03-31 RX ORDER — MORPHINE SULFATE 4 MG/ML
4 INJECTION, SOLUTION INTRAMUSCULAR; INTRAVENOUS EVERY 2 HOUR PRN
Status: DISCONTINUED | OUTPATIENT
Start: 2024-03-31 | End: 2024-04-01

## 2024-03-31 NOTE — ED QUICK NOTES
Orders for admission, patient is aware of plan and ready to go upstairs. Any questions, please call ED ELKE Mitchell at extension [phone].     Patient Covid vaccination status: Unvaccinated     COVID Test Ordered in ED: None    COVID Suspicion at Admission: N/A    Running Infusions:  None    Mental Status/LOC at time of transport: x4    Other pertinent information:   CIWA score: N/A   NIH score:  N/A

## 2024-03-31 NOTE — ED QUICK NOTES
Per Dr. Mcgraw, hold off on blood work. Pt does not want to be admitted. Will re-evaluate blood work after MRI results.

## 2024-03-31 NOTE — TELEPHONE ENCOUNTER
Patient called on Sunday early afternoon complaining of new left leg pain. He has had chronic right leg pain s/p 2 surgeries and is scheduled Monday for an MYRA with Dr Pittman. His last MRI was just over a week ago, no new injuries. All path on MRI was right sided. Complains of \"inability to walk.\" I recommended ER visit based on new sx, patient and wife understood instructions and agreed.

## 2024-03-31 NOTE — ED INITIAL ASSESSMENT (HPI)
Patient presents to ED with lumbar back pain. Hx of 2 surgeries within the last 5 months. Weakness in legs. Denies incontinence.

## 2024-04-01 ENCOUNTER — APPOINTMENT (OUTPATIENT)
Dept: GENERAL RADIOLOGY | Facility: HOSPITAL | Age: 39
End: 2024-04-01
Attending: PHYSICAL MEDICINE & REHABILITATION
Payer: COMMERCIAL

## 2024-04-01 ENCOUNTER — APPOINTMENT (OUTPATIENT)
Dept: SURGERY | Facility: CLINIC | Age: 39
End: 2024-04-01
Payer: COMMERCIAL

## 2024-04-01 VITALS
BODY MASS INDEX: 26 KG/M2 | TEMPERATURE: 98 F | DIASTOLIC BLOOD PRESSURE: 87 MMHG | OXYGEN SATURATION: 94 % | WEIGHT: 164 LBS | RESPIRATION RATE: 18 BRPM | HEART RATE: 88 BPM | SYSTOLIC BLOOD PRESSURE: 133 MMHG

## 2024-04-01 LAB
ALBUMIN SERPL-MCNC: 4.5 G/DL (ref 3.2–4.8)
ALBUMIN/GLOB SERPL: 1.7 {RATIO} (ref 1–2)
ALP LIVER SERPL-CCNC: 78 U/L
ALT SERPL-CCNC: 11 U/L
ANION GAP SERPL CALC-SCNC: 6 MMOL/L (ref 0–18)
AST SERPL-CCNC: 13 U/L (ref ?–34)
BASOPHILS # BLD AUTO: 0.06 X10(3) UL (ref 0–0.2)
BASOPHILS NFR BLD AUTO: 0.6 %
BILIRUB SERPL-MCNC: 0.5 MG/DL (ref 0.3–1.2)
BUN BLD-MCNC: 14 MG/DL (ref 9–23)
BUN/CREAT SERPL: 14.3 (ref 10–20)
CALCIUM BLD-MCNC: 10 MG/DL (ref 8.7–10.4)
CHLORIDE SERPL-SCNC: 104 MMOL/L (ref 98–112)
CHOLEST SERPL-MCNC: 132 MG/DL (ref ?–200)
CO2 SERPL-SCNC: 29 MMOL/L (ref 21–32)
CREAT BLD-MCNC: 0.98 MG/DL
DEPRECATED RDW RBC AUTO: 41.6 FL (ref 35.1–46.3)
EGFRCR SERPLBLD CKD-EPI 2021: 100 ML/MIN/1.73M2 (ref 60–?)
EOSINOPHIL # BLD AUTO: 0.21 X10(3) UL (ref 0–0.7)
EOSINOPHIL NFR BLD AUTO: 2.3 %
ERYTHROCYTE [DISTWIDTH] IN BLOOD BY AUTOMATED COUNT: 12.3 % (ref 11–15)
GLOBULIN PLAS-MCNC: 2.7 G/DL (ref 2.8–4.4)
GLUCOSE BLD-MCNC: 109 MG/DL (ref 70–99)
HCT VFR BLD AUTO: 44.7 %
HDLC SERPL-MCNC: 43 MG/DL (ref 40–59)
HGB BLD-MCNC: 15.1 G/DL
IMM GRANULOCYTES # BLD AUTO: 0.07 X10(3) UL (ref 0–1)
IMM GRANULOCYTES NFR BLD: 0.8 %
LDLC SERPL CALC-MCNC: 63 MG/DL (ref ?–100)
LYMPHOCYTES # BLD AUTO: 2.65 X10(3) UL (ref 1–4)
LYMPHOCYTES NFR BLD AUTO: 28.4 %
MAGNESIUM SERPL-MCNC: 2 MG/DL (ref 1.6–2.6)
MCH RBC QN AUTO: 30.9 PG (ref 26–34)
MCHC RBC AUTO-ENTMCNC: 33.8 G/DL (ref 31–37)
MCV RBC AUTO: 91.4 FL
MONOCYTES # BLD AUTO: 1.02 X10(3) UL (ref 0.1–1)
MONOCYTES NFR BLD AUTO: 10.9 %
NEUTROPHILS # BLD AUTO: 5.31 X10 (3) UL (ref 1.5–7.7)
NEUTROPHILS # BLD AUTO: 5.31 X10(3) UL (ref 1.5–7.7)
NEUTROPHILS NFR BLD AUTO: 57 %
NONHDLC SERPL-MCNC: 89 MG/DL (ref ?–130)
OSMOLALITY SERPL CALC.SUM OF ELEC: 289 MOSM/KG (ref 275–295)
PLATELET # BLD AUTO: 236 10(3)UL (ref 150–450)
POTASSIUM SERPL-SCNC: 4 MMOL/L (ref 3.5–5.1)
PROT SERPL-MCNC: 7.2 G/DL (ref 5.7–8.2)
RBC # BLD AUTO: 4.89 X10(6)UL
SODIUM SERPL-SCNC: 139 MMOL/L (ref 136–145)
TRIGL SERPL-MCNC: 154 MG/DL (ref 30–149)
VLDLC SERPL CALC-MCNC: 23 MG/DL (ref 0–30)
WBC # BLD AUTO: 9.3 X10(3) UL (ref 4–11)

## 2024-04-01 PROCEDURE — 3E0R3BZ INTRODUCTION OF ANESTHETIC AGENT INTO SPINAL CANAL, PERCUTANEOUS APPROACH: ICD-10-PCS | Performed by: PHYSICAL MEDICINE & REHABILITATION

## 2024-04-01 PROCEDURE — 64493 INJ PARAVERT F JNT L/S 1 LEV: CPT | Performed by: PHYSICAL MEDICINE & REHABILITATION

## 2024-04-01 PROCEDURE — 64483 NJX AA&/STRD TFRM EPI L/S 1: CPT | Performed by: PHYSICAL MEDICINE & REHABILITATION

## 2024-04-01 PROCEDURE — 3E0R33Z INTRODUCTION OF ANTI-INFLAMMATORY INTO SPINAL CANAL, PERCUTANEOUS APPROACH: ICD-10-PCS | Performed by: PHYSICAL MEDICINE & REHABILITATION

## 2024-04-01 PROCEDURE — 99233 SBSQ HOSP IP/OBS HIGH 50: CPT | Performed by: HOSPITALIST

## 2024-04-01 RX ORDER — IOPAMIDOL 408 MG/ML
INJECTION, SOLUTION INTRATHECAL AS NEEDED
Status: DISCONTINUED | OUTPATIENT
Start: 2024-04-01 | End: 2024-04-01 | Stop reason: HOSPADM

## 2024-04-01 RX ORDER — LIDOCAINE HYDROCHLORIDE 10 MG/ML
INJECTION, SOLUTION EPIDURAL; INFILTRATION; INTRACAUDAL; PERINEURAL AS NEEDED
Status: DISCONTINUED | OUTPATIENT
Start: 2024-04-01 | End: 2024-04-01 | Stop reason: HOSPADM

## 2024-04-01 RX ORDER — TRIAMCINOLONE ACETONIDE 40 MG/ML
INJECTION, SUSPENSION INTRA-ARTICULAR; INTRAMUSCULAR AS NEEDED
Status: DISCONTINUED | OUTPATIENT
Start: 2024-04-01 | End: 2024-04-01 | Stop reason: HOSPADM

## 2024-04-01 RX ORDER — GABAPENTIN 300 MG/1
300 CAPSULE ORAL 3 TIMES DAILY
Status: DISCONTINUED | OUTPATIENT
Start: 2024-04-01 | End: 2024-04-01

## 2024-04-01 RX ORDER — BETAMETHASONE SODIUM PHOSPHATE AND BETAMETHASONE ACETATE 3; 3 MG/ML; MG/ML
INJECTION, SUSPENSION INTRA-ARTICULAR; INTRALESIONAL; INTRAMUSCULAR; SOFT TISSUE AS NEEDED
Status: DISCONTINUED | OUTPATIENT
Start: 2024-04-01 | End: 2024-04-01 | Stop reason: HOSPADM

## 2024-04-01 NOTE — PHYSICAL THERAPY NOTE
PHYSICAL THERAPY EVALUATION - INPATIENT     Room Number: 411/411-A  Evaluation Date: 4/1/2024  Type of Evaluation: Initial   Physician Order: PT Eval and Treat    Presenting Problem: intractable low back pain, leg weakness, s/p MYRA 4/1  Co-Morbidities : Hx spine sx November 2023 and 2/28/23  Reason for Therapy: Mobility Dysfunction and Discharge Planning    PHYSICAL THERAPY ASSESSMENT   Patient is a 38 year old male admitted 3/31/2024 for intractable low back pain, leg weakness, s/p MYRA 4/1. Prior to admission, patient's baseline is Independent with ADLs/iADLs/functional mobility.  Patient is currently functioning near baseline with bed mobility, transfers, gait, stair negotiation, and standing prolonged periods.  Patient is requiring supervision and stand-by assist as a result of the following impairments: decreased endurance/aerobic capacity and limited lumbar ROM. Based on demonstrating good understanding of spinal precautions and safe functional mobility observed this date, no further skilled IP PT required at this time. RN made aware. Patient agreeable. Safe for DC to home from a PT standpoint.     Patient will benefit from continued skilled PT Services at discharge to promote functional independence and safety with additional support and return home with OP PT when medically cleared.    PLAN  Patient has been evaluated and presents with no skilled Physical Therapy needs at this time.  Patient will be discharged from Physical Therapy services. Please re-order if a new functional limitation presents during this admission.    PHYSICAL THERAPY MEDICAL/SOCIAL HISTORY     Problem List  Principal Problem:    Intractable low back pain      HOME SITUATION  Home Situation  Type of Home: House  Home Layout: Multi-level;Able to live on main level  Stairs to Enter : 7  Railing: Yes  Lives With: Spouse  Drives: Yes  Patient Owned Equipment: None  Patient Regularly Uses: Glasses     SUBJECTIVE  \"I couldn't even do this  before\"    PHYSICAL THERAPY EXAMINATION   OBJECTIVE  Precautions: Spine  Fall Risk: Standard fall risk    WEIGHT BEARING RESTRICTION  Weight Bearing Restriction: None    PAIN ASSESSMENT  Ratin    COGNITION  Overall Cognitive Status:  WFL - within functional limits    RANGE OF MOTION AND STRENGTH ASSESSMENT  Upper extremity ROM and strength are within functional limits   Lower extremity ROM is within functional limits   Lower extremity strength is within functional limits     BALANCE  Static Sitting: Normal  Dynamic Sitting: Good  Static Standing: Fair +  Dynamic Standing: Fair    AM-PAC '6-Clicks' INPATIENT SHORT FORM - BASIC MOBILITY  How much difficulty does the patient currently have...  Patient Difficulty: Turning over in bed (including adjusting bedclothes, sheets and blankets)?: A Little   Patient Difficulty: Sitting down on and standing up from a chair with arms (e.g., wheelchair, bedside commode, etc.): A Little   Patient Difficulty: Moving from lying on back to sitting on the side of the bed?: A Little   How much help from another person does the patient currently need...   Help from Another: Moving to and from a bed to a chair (including a wheelchair)?: A Little   Help from Another: Need to walk in hospital room?: A Little   Help from Another: Climbing 3-5 steps with a railing?: A Little     AM-PAC Score:  Raw Score: 18   Approx Degree of Impairment: 46.58%   Standardized Score (AM-PAC Scale): 43.63   CMS Modifier (G-Code): CK    FUNCTIONAL ABILITY STATUS  Functional Mobility/Gait Assessment  Gait Assistance: Supervision  Distance (ft): 400  Assistive Device: None  Pattern: Within Functional Limits (decreased david speed)  Stairs: Stairs  How Many Stairs: 12  Device: 1 Rail  Assist:  (Stand by assist)  Pattern: Ascend and Descend  Ascend and Descend : Reciprocal  Rolling: supervision  Supine to Sit: supervision via log roll technique  Sit to Supine: supervision via log roll technique   Sit to Stand:  supervision    Exercise/Education Provided:  Bed mobility  Body mechanics  Energy conservation  Functional activity tolerated  Gait training  Posture  Transfer training  Stair negotiation  Re-enforced use of spinal precautions and log rolling during functional mobility - patient familiar due to prior spine surgeries. Patient with good carryover throughout.    The patient's Approx Degree of Impairment: 46.58% has been calculated based on documentation in the [facility] '6 clicks' Inpatient Basic Mobility Short Form.  Research supports that patients with this level of impairment may benefit from OP PT when medically cleared.  Final disposition will be made by interdisciplinary medical team.    RN approved activity. Educated patient on POC and benefits of mobilization. Agreeable to participate. Patient reporting no pain. Educated patient on post-op spinal precautions, log rolling, progressive mobility, and frequent position changes.   Patient End of Session: In bed;Call light within reach;Needs met;RN aware of session/findings;All patient questions and concerns addressed    Patient was able to achieve the following ...   Patient able to transfer   Supervision, safe for home    Patient able to ambulate on level surfaces   Supervision, safe for home     Patient Evaluation Complexity Level:  History Low - no personal factors and/or co-morbidities   Examination of body systems Low -  addressing 1-2 elements   Clinical Presentation Low- Stable   Clinical Decision Making  Low Complexity     Gait Training: 15 minutes

## 2024-04-01 NOTE — ED PROVIDER NOTES
Patient Seen in: [facility] Emergency Department    History     Chief Complaint   Patient presents with    Back Pain       HPI    The patient presents to the ED complaining of severe lower back pain that is much worse with standing.  He states when lying down his pain is almost completely gone.  He had recent back surgery on 2024 and 1 prior back surgery in November of last year.  He states since his back surgery his pain is increased and he has had pain shooting down his right leg and had MRI for this last week.  Today he has pain to down his left leg as well.  He states no bowel bladder symptoms and can still walk but has to shuffle due to the pain.  No other complaints.    History reviewed.   Past Medical History:   Diagnosis Date    Eye infection, right        History reviewed.   Past Surgical History:   Procedure Laterality Date    HAND/FINGER SURGERY UNLISTED      LUMBAR SPINE SURGERY  2023    L4-5 LUMBAR LAMINECTOMY, RESECTION OF DISC         Medications :  Medications Prior to Admission   Medication Sig Dispense Refill Last Dose    gabapentin 300 MG Oral Cap Start with night time dose only. If well tolerated, increase to two times daily. If well tolerated, increase to three times daily. 90 capsule 0 3/31/2024 at 1400    ALPRAZolam 0.25 MG Oral Tab Take 1 tablet (0.25 mg total) by mouth 2 (two) times daily as needed.   Past Week    buPROPion  MG Oral Tablet 24 Hr Take 1 tablet (300 mg total) by mouth before breakfast.   3/31/2024 at am    naproxen 500 MG Oral Tab Take 1 tablet (500 mg total) by mouth 2 (two) times daily with meals.   3/27/2024    methylPREDNISolone 4 MG Oral Tablet Therapy Pack As directed 1 each 0 3/29/2024    [] HYDROcodone-acetaminophen 5-325 MG Oral Tab Take 1 tablet by mouth every 6 (six) hours as needed for Pain. 28 tablet 0     [] acetaminophen 500 MG Oral Tab Take 2 tablets (1,000 mg total) by mouth every 6 (six) hours as needed for Pain. 56  tablet 0         Family History   Problem Relation Age of Onset    Cancer Father 62        Bladder       Smoking Status:   Social History     Socioeconomic History    Marital status:    Tobacco Use    Smoking status: Every Day     Packs/day: .5     Types: Cigarettes    Smokeless tobacco: Never   Vaping Use    Vaping Use: Never used   Substance and Sexual Activity    Alcohol use: Yes    Drug use: Never       Constitutional and vital signs reviewed.      Social History and Family History elements reviewed from today, pertinent positives to the presenting problem noted.    Physical Exam     ED Triage Vitals [03/31/24 1544]   /80   Pulse 94   Resp 18   Temp 98.4 °F (36.9 °C)   Temp src Temporal   SpO2 97 %   O2 Device None (Room air)       All measures to prevent infection transmission during my interaction with the patient were taken. Handwashing was performed prior to and after the exam.  Stethoscope and any equipment used during my examination was cleaned with super sani-cloth germicidal wipes following the exam.     Physical Exam  Vitals and nursing note reviewed.   Constitutional:       General: He is not in acute distress.     Appearance: He is well-developed. He is not ill-appearing.   HENT:      Head: Normocephalic and atraumatic.   Eyes:      General:         Right eye: No discharge.         Left eye: No discharge.      Conjunctiva/sclera: Conjunctivae normal.   Neck:      Trachea: No tracheal deviation.   Cardiovascular:      Rate and Rhythm: Normal rate.   Pulmonary:      Effort: Pulmonary effort is normal. No respiratory distress.      Breath sounds: No stridor.   Abdominal:      General: There is no distension.      Palpations: Abdomen is soft.   Musculoskeletal:         General: No deformity.   Skin:     General: Skin is warm and dry.   Neurological:      Mental Status: He is alert and oriented to person, place, and time.      Sensory: No sensory deficit.      Motor: No weakness.    Psychiatric:         Mood and Affect: Mood normal.         Behavior: Behavior normal.         ED Course        Labs Reviewed   BASIC METABOLIC PANEL (8) - Abnormal; Notable for the following components:       Result Value    Glucose 106 (*)     All other components within normal limits   CBC WITH DIFFERENTIAL WITH PLATELET    Narrative:     The following orders were created for panel order CBC With Differential With Platelet.                  Procedure                               Abnormality         Status                                     ---------                               -----------         ------                                     CBC W/ DIFFERENTIAL[[id number]]                              Final result                                                 Please view results for these tests on the individual orders.   COMP METABOLIC PANEL (14)   LIPID PANEL   CBC WITH DIFFERENTIAL WITH PLATELET   MAGNESIUM   CBC W/ DIFFERENTIAL       As Interpreted by me    Imaging Results Available and Reviewed while in ED: MRI SPINE LUMBAR (CPT=72148)    Result Date: 3/31/2024  CONCLUSION:  1.  Post right L4-5 laminotomy.  Within the lateral body there is a stable tract of fluid extending from the skin surface to the laminotomy site, measuring approximately 5 x 2 x 2 cm. 2.  At L4-5 there has been a right-sided laminotomy.  Right paracentral disc protrusion with caudal extrusion.  Within the right laminotomy side blocking the entrance of the right neural foramen is a cystic focus measuring 16 x 9 x 13 mm, which could represent a small seroma or synovial cyst.  Collectively these findings cause severe central canal narrowing and severe right and moderate left neural foraminal narrowing.  These findings also potentially contact the transitioning bilateral L5 nerve roots.  The size of the disc protrusion as well as the degree of central canal narrowing is worse compared to recent MRI from 3/22/24.    Dictated by (CST):  Chang Coe MD on 3/31/2024 at 6:41 PM     Finalized by (CST): Chang Coe MD on 3/31/2024 at 6:49 PM         ED Medications Administered:   Medications   morphINE PF 2 MG/ML injection 1 mg ( Intravenous See Alternative 3/31/24 2107)     Or   morphINE PF 2 MG/ML injection 2 mg ( Intravenous See Alternative 3/31/24 2107)     Or   morphINE PF 4 MG/ML injection 4 mg (4 mg Intravenous Given 3/31/24 2107)   acetaminophen (Tylenol) tab 650 mg ( Oral See Alternative 3/31/24 2153)     Or   HYDROcodone-acetaminophen (Norco) 5-325 MG per tab 1 tablet ( Oral See Alternative 3/31/24 2153)     Or   HYDROcodone-acetaminophen (Norco) 5-325 MG per tab 2 tablet (2 tablets Oral Given 3/31/24 2153)   polyethylene glycol (PEG 3350) (Miralax) 17 g oral packet 17 g (has no administration in time range)   sennosides (Senokot) tab 17.2 mg (has no administration in time range)   bisacodyl (Dulcolax) 10 MG rectal suppository 10 mg (has no administration in time range)   fleet enema (Fleet) 7-19 GM/118ML rectal enema 133 mL (has no administration in time range)   ondansetron (Zofran) 4 MG/2ML injection 4 mg (has no administration in time range)   prochlorperazine (Compazine) 10 MG/2ML injection 5 mg (has no administration in time range)   ALPRAZolam (Xanax) tab 0.25 mg (has no administration in time range)   gabapentin (Neurontin) cap 300 mg (300 mg Oral Given 3/31/24 2153)   buPROPion ER (Wellbutrin XL) 24 hr tab 300 mg (has no administration in time range)   morphINE PF 4 MG/ML injection 4 mg (4 mg Intravenous Given 3/31/24 1926)   ondansetron (Zofran) 4 MG/2ML injection 4 mg (4 mg Intravenous Given 3/31/24 1926)         MDM     Vitals:    03/31/24 1845 03/31/24 1930 03/31/24 1957 03/31/24 2004   BP: 123/82 (!) 130/92 127/87    BP Location:   Right arm    Pulse: 89 78 78    Resp:  18 18    Temp:   98.4 °F (36.9 °C)    TempSrc:   Oral    SpO2: 93% 98% 95%    Weight:   74.4 kg 74.4 kg     *I personally reviewed and interpreted all ED  vitals.    Pulse Ox: 95%, Room air, Normal     Monitor Interpretation:   normal sinus rhythm as interpreted by me.  The cardiac monitor was ordered to monitor heart rate.    Differential Diagnosis/ Diagnostic Considerations: Cauda equina, low back pain, seroma, other    Complicating Factors: The patient already has does not have any pertinent problems on file. to contribute to the complexity of this ED evaluation.    Medical Decision Making  Patient presents to the ED with worsened low back pain and now pain rating down both of his legs.  He is neurologically intact but MRI imaging shows worsened spinal stenosis.  I discussed with Dr. Lay for neurosurgery consultation who recommends admission for management.  I discussed with Dr. Torres for admission.    Problems Addressed:  Intractable low back pain: acute illness or injury  Spinal stenosis of lumbar region without neurogenic claudication: acute illness or injury that poses a threat to life or bodily functions    Amount and/or Complexity of Data Reviewed  Labs: ordered. Decision-making details documented in ED Course.  Radiology: ordered and independent interpretation performed. Decision-making details documented in ED Course.     Details: I personally reviewed the patient's MRI lumbar spine and noted a seroma.  Discussion of management or test interpretation with external provider(s):  I discussed with Dr. Lay for neurosurgery consultation who recommends admission for management.  I discussed with Dr. Torres for admission.        Condition upon leaving the department: Stable    Disposition and Plan     Clinical Impression:  1. Intractable low back pain    2. Spinal stenosis of lumbar region without neurogenic claudication        Disposition:  Admit    Follow-up:  No follow-up provider specified.    Medications Prescribed:  Current Discharge Medication List          Hospital Problems       Present on Admission  Date Reviewed: 3/25/2024             ICD-10-CM Noted POA    * (Principal) Intractable low back pain M54.59 3/31/2024 Unknown

## 2024-04-01 NOTE — PLAN OF CARE
Patient Alert and Oriented x4. Given PRN morphine for pain. Fall precautions in place.   Problem: Patient Centered Care  Goal: Patient preferences are identified and integrated in the patient's plan of care  Description: Interventions:  - What would you like us to know as we care for you?   - Provide timely, complete, and accurate information to patient/family  - Incorporate patient and family knowledge, values, beliefs, and cultural backgrounds into the planning and delivery of care  - Encourage patient/family to participate in care and decision-making at the level they choose  - Honor patient and family perspectives and choices  Outcome: Progressing

## 2024-04-01 NOTE — PROGRESS NOTES
[facility]  part of [facility]    Progress Note    Byron Garcia Patient Status:  Observation    1985 MRN [id number]   Location [facility] 4W/SW/SE Attending Reji Torres MD   Hosp Day # 0 PCP RAJIV CALVILLO       Subjective:   Byron Garcia is a(n) 38 year old male was seen and examined  No acute events overnight  Lying in bed, NAD  Pain persists but controlled     Objective:   Blood pressure 130/83, pulse 75, temperature 98.4 °F (36.9 °C), temperature source Oral, resp. rate 18, weight 164 lb (74.4 kg), SpO2 94%.    General: NAD  Neck: No JVD  Lungs: CTA bilat  Heart: RRR, S1, S2  Abdomen: Soft, NT/ND, BS+x4  Pulses: 2+ bilat DP  Skin: no rashes or legions noted  Neurological:  AAOx3, RLE strength 3/5, LLE 4/5, sensation intact b/l    Current Inpatient Medications:     Current Facility-Administered Medications:     morphINE PF 2 MG/ML injection 1 mg, 1 mg, Intravenous, Q2H PRN **OR** morphINE PF 2 MG/ML injection 2 mg, 2 mg, Intravenous, Q2H PRN **OR** morphINE PF 4 MG/ML injection 4 mg, 4 mg, Intravenous, Q2H PRN    acetaminophen (Tylenol) tab 650 mg, 650 mg, Oral, Q4H PRN **OR** HYDROcodone-acetaminophen (Norco) 5-325 MG per tab 1 tablet, 1 tablet, Oral, Q4H PRN **OR** HYDROcodone-acetaminophen (Norco) 5-325 MG per tab 2 tablet, 2 tablet, Oral, Q4H PRN    polyethylene glycol (PEG 3350) (Miralax) 17 g oral packet 17 g, 17 g, Oral, Daily PRN    sennosides (Senokot) tab 17.2 mg, 17.2 mg, Oral, Nightly PRN    bisacodyl (Dulcolax) 10 MG rectal suppository 10 mg, 10 mg, Rectal, Daily PRN    fleet enema (Fleet) 7-19 GM/118ML rectal enema 133 mL, 1 enema, Rectal, Once PRN    ondansetron (Zofran) 4 MG/2ML injection 4 mg, 4 mg, Intravenous, Q6H PRN    prochlorperazine (Compazine) 10 MG/2ML injection 5 mg, 5 mg, Intravenous, Q8H PRN    ALPRAZolam (Xanax) tab 0.25 mg, 0.25 mg, Oral, BID PRN    gabapentin (Neurontin) cap 300 mg, 300 mg, Oral, Nightly    buPROPion ER  (Wellbutrin XL) 24 hr tab 300 mg, 300 mg, Oral, Before breakfast    Assessment and Plan:   Intractable low back pain  Also with pain referring down to b/l LE's  Due to worsening central canal narrowing, severe R and moderate L neural foraminal narrowing  In the setting of previous laminectomy x 2  Neurosurgery and PMR have been consulted  To undergo LESI today  Cont pain control  Cont gabapentin   Cont PT/OT     Tobacco use  Counseled on cessation      DVT Ppx: SCDs  Full code    Results:     Recent Labs   Lab 03/31/24 1932 04/01/24  0342   RBC 4.89 4.89   HGB 15.0 15.1   HCT 44.9 44.7   MCV 91.8 91.4   MCH 30.7 30.9   MCHC 33.4 33.8   RDW 12.3 12.3   NEPRELIM 6.82 5.31   WBC 10.4 9.3   .0 236.0         Recent Labs   Lab 03/31/24 1932 04/01/24  0342   * 109*   BUN 15 14   CREATSERUM 1.01 0.98   EGFRCR 96 100   CA 10.2 10.0    139   K 3.7 4.0    104   CO2 32.0 29.0         Imaging:   MRI SPINE LUMBAR (CPT=72148)    Result Date: 3/31/2024  CONCLUSION:  1.  Post right L4-5 laminotomy.  Within the lateral body there is a stable tract of fluid extending from the skin surface to the laminotomy site, measuring approximately 5 x 2 x 2 cm. 2.  At L4-5 there has been a right-sided laminotomy.  Right paracentral disc protrusion with caudal extrusion.  Within the right laminotomy side blocking the entrance of the right neural foramen is a cystic focus measuring 16 x 9 x 13 mm, which could represent a small seroma or synovial cyst.  Collectively these findings cause severe central canal narrowing and severe right and moderate left neural foraminal narrowing.  These findings also potentially contact the transitioning bilateral L5 nerve roots.  The size of the disc protrusion as well as the degree of central canal narrowing is worse compared to recent MRI from 3/22/24.    Dictated by (CST): Chang Coe MD on 3/31/2024 at 6:41 PM     Finalized by (CST): Chang Coe MD on 3/31/2024 at 6:49 PM                  Reji Torres MD  4/1/2024

## 2024-04-01 NOTE — CONSULTS
[facility]  part of [facility]    Neurosurgery Consultation    Byron Garcia Patient Status:  Inpatient    1985 MRN [id number]   Location [facility] 4W/SW/SE Attending Reji Torres MD   Hosp Day # 1 PCP RAJIV CALVILLO     Date of Admission:  3/31/2024  Date of Consult:  2024    Reason for Consultation:  L4-5 disc herniation    History of Present Illness:  Byron Garcia is a a(n) 38 year old male who is well-known to Dr. Dinero having undergone L4-5 MLD on  and redo L4-5 MLD on  presented to the emergency department with worsening bilateral buttocks and posterior thigh shooting pain and inability to ambulate due to weakness and pain.  Patient was scheduled to have an outpatient epidural steroid injection at L4-5 level with Dr. Pittman today.  Repeat MRI lumbar spine shows a degree of worsening central canal stenosis and disc protrusion.  Patient denies changes in his bowel or bladder, saddle anesthesia, numbness in the lower extremity.    History:  Past Medical History:   Diagnosis Date    Eye infection, right      Past Surgical History:   Procedure Laterality Date    HAND/FINGER SURGERY UNLISTED      LUMBAR SPINE SURGERY  2023    L4-5 LUMBAR LAMINECTOMY, RESECTION OF DISC     Family History   Problem Relation Age of Onset    Cancer Father 62        Bladder      reports that he has been smoking cigarettes. He has been smoking an average of 0.5 packs per day. He has never used smokeless tobacco. He reports current alcohol use. He reports that he does not use drugs.    Allergies:  No Known Allergies    Medications:    Current Facility-Administered Medications:     gabapentin (Neurontin) cap 300 mg, 300 mg, Oral, TID    morphINE PF 2 MG/ML injection 1 mg, 1 mg, Intravenous, Q2H PRN **OR** morphINE PF 2 MG/ML injection 2 mg, 2 mg, Intravenous, Q2H PRN **OR** morphINE PF 4 MG/ML injection 4 mg, 4 mg, Intravenous, Q2H PRN    acetaminophen (Tylenol)  tab 650 mg, 650 mg, Oral, Q4H PRN **OR** HYDROcodone-acetaminophen (Norco) 5-325 MG per tab 1 tablet, 1 tablet, Oral, Q4H PRN **OR** HYDROcodone-acetaminophen (Norco) 5-325 MG per tab 2 tablet, 2 tablet, Oral, Q4H PRN    polyethylene glycol (PEG 3350) (Miralax) 17 g oral packet 17 g, 17 g, Oral, Daily PRN    sennosides (Senokot) tab 17.2 mg, 17.2 mg, Oral, Nightly PRN    bisacodyl (Dulcolax) 10 MG rectal suppository 10 mg, 10 mg, Rectal, Daily PRN    fleet enema (Fleet) 7-19 GM/118ML rectal enema 133 mL, 1 enema, Rectal, Once PRN    ondansetron (Zofran) 4 MG/2ML injection 4 mg, 4 mg, Intravenous, Q6H PRN    prochlorperazine (Compazine) 10 MG/2ML injection 5 mg, 5 mg, Intravenous, Q8H PRN    ALPRAZolam (Xanax) tab 0.25 mg, 0.25 mg, Oral, BID PRN    buPROPion ER (Wellbutrin XL) 24 hr tab 300 mg, 300 mg, Oral, Before breakfast    Review of Systems:  A 10-point system was reviewed.  Pertinent positives and negatives are noted in HPI.      Physical Exam:  Temp:  [98.4 °F (36.9 °C)] 98.4 °F (36.9 °C)  Pulse:  [75-94] 75  Resp:  [18] 18  BP: (120-130)/(74-92) 130/83  SpO2:  [93 %-98 %] 94 %  No intake/output data recorded.      Neurological Exam:  Strength:  5 of 5 bilateral lower extremities    Sensation:  Intact in bilateral lower extremities throughout    DTRs:  2+ out of 4 bilateral lower extremities    Incision:  Clean, dry, intact    Abdomen:  Soft, non-distended, non-tender, with no rebound or guarding.  No peritoneal signs.     Extremities:  Non-tender, no lower extremity edema noted.      Labs:  Lab Results   Component Value Date    WBC 9.3 04/01/2024    HGB 15.1 04/01/2024    .0 04/01/2024    BUN 14 04/01/2024     04/01/2024    K 4.0 04/01/2024    CO2 29.0 04/01/2024     (H) 04/01/2024    ALB 4.5 04/01/2024       Imaging:  XR PAIN CLINIC FLUOROSCOPY - N/C    Result Date: 4/1/2024   Fluoroscopy support was provided.  Images demonstrate lumbar spinal injection. Please see separate report for  additional details.     Dictated by (CST): Charli Hunt MD on 4/01/2024 at 1:59 PM     Finalized by (CST): Charli Hunt MD on 4/01/2024 at 2:02 PM          MRI SPINE LUMBAR (CPT=72148)    Result Date: 3/31/2024  CONCLUSION:  1.  Post right L4-5 laminotomy.  Within the lateral body there is a stable tract of fluid extending from the skin surface to the laminotomy site, measuring approximately 5 x 2 x 2 cm. 2.  At L4-5 there has been a right-sided laminotomy.  Right paracentral disc protrusion with caudal extrusion.  Within the right laminotomy side blocking the entrance of the right neural foramen is a cystic focus measuring 16 x 9 x 13 mm, which could represent a small seroma or synovial cyst.  Collectively these findings cause severe central canal narrowing and severe right and moderate left neural foraminal narrowing.  These findings also potentially contact the transitioning bilateral L5 nerve roots.  The size of the disc protrusion as well as the degree of central canal narrowing is worse compared to recent MRI from 3/22/24.    Dictated by (CST): Chang Coe MD on 3/31/2024 at 6:41 PM     Finalized by (CST): Chang Coe MD on 3/31/2024 at 6:49 PM              Assessment/Plan:  Principal Problem:    Intractable low back pain    Mr. Garcia is a 38-year-old male who presented to the emergency department was found to have worsening L4-5 disc protrusion and central canal stenosis status post redo L4-5 MLD 5 weeks ago.  We reviewed the MRI and discussed the findings with the patient.  There is evidence of an acute L4-5 disc extrusion and resultant moderate to severe central canal stenosis.  There are no red flag symptoms on exam including bowel or bladder changes, saddle anesthesia.  Due to the acuteness of the disc herniation we would like patient undergo his scheduled epidural steroid injection and have asked PMR to evaluate patient.  We further discussed surgical intervention in the form of an L4-5 interbody  fusion to fully eliminate the annular tear and disc extrusion as well as eliminate motion at the level.  Will plan to reevaluate patient after his epidural steroid injection.  I have added gabapentin 300 mg 3 times daily to help control radicular pain in his lower extremities.    More than 60 minutes were spent in consultation and coordination of this patient's care. All questions and concerns were addressed. We appreciate the opportunity to participate in the care of this patient. Please do not hesitate to call our office ([phone]) with any issues.    Hang Rice PA-C  4/1/2024  3:39 PM

## 2024-04-01 NOTE — OCCUPATIONAL THERAPY NOTE
OCCUPATIONAL THERAPY EVALUATION - INPATIENT     Room Number: [facility] PACU/[facility] PACU  Evaluation Date: 2024  Type of Evaluation: Quick Eval  Presenting Problem: intractable low back pain s/p MYRA   Hx spine sx 2023 and 23     Physician Order: IP Consult to Occupational Therapy  Reason for Therapy: ADL/IADL Dysfunction and Discharge Planning    OCCUPATIONAL THERAPY ASSESSMENT   Patient is a 38 year old male admitted 3/31/2024 for  intractable low back pain s/p MYRA .  Prior to admission, patient's baseline is independent.  Patient is currently functioning near baseline with  ADLs and fx mobility/transfers . Stated he had no concerns r/t ability to perform self-cares upon discharge. Anticipate patient will progress to safely discharge home with additional support as needed.     PLAN  Patient has been evaluated and presents with no skilled Occupational Therapy needs  at this time.  Patient will be discharged from Occupational Therapy services. Please re-order if a new functional limitation presents during this admission.     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  Drives: Yes  Patient Regularly Uses: Glasses  Use of Assistive Device(s): None    Prior Level of G[place]: Independent    SUBJECTIVE  \"I feel so much better.\"    OCCUPATIONAL THERAPY EXAMINATION    OBJECTIVE  Precautions: Spine  Fall Risk: Standard fall risk    WEIGHT BEARING RESTRICTION  None    PAIN ASSESSMENT  Ratin    COGNITION  Overall Cognitive Status:  WFL - within functional limits    SENSATION  Light touch:  intact    RANGE OF MOTION   Upper extremity ROM is within functional limits     STRENGTH ASSESSMENT  Upper extremity strength is within  functional limits     COORDINATION  Gross Motor: WFL   Fine Motor: WFL     ACTIVITIES OF DAILY LIVING ASSESSMENT  AM-PAC ‘6-Clicks’ Inpatient Daily Activity Short Form  How much help from another person does the patient currently need…  -   Putting on and taking off regular lower body clothing?: A Little  -   Bathing (including washing, rinsing, drying)?: A Little  -   Toileting, which includes using toilet, bedpan or urinal? : A Little  -   Putting on and taking off regular upper body clothing?: None  -   Taking care of personal grooming such as brushing teeth?: None  -   Eating meals?: None    AM-PAC Score:  Score: 21  Approx Degree of Impairment: 32.79%  Standardized Score (AM-PAC Scale): 44.27  CMS Modifier (G-Code): CJ    BED MOBILITY  Supine <> Sit: Indep  Comments:  Patient familiar with log roll technique to ensure spine precautions during bed mobility.    FUNCTIONAL TRANSFER ASSESSMENT  Sit <> Stand from EOB: Indep    FUNCTIONAL MOBILITY  SUP for community distance fx mobility without a device.    FUNCTIONAL ADL ASSESSMENT  Eating: independent seated (per obs)   Grooming: supervision standing at sink (per obs)   UB Dressing: independent seated (per obs)   LB Dressing: supervision - patient receptive to education on performing figure-4 technique with B LE to ensure spine precaution adherence during ADLs  Toileting: supervision seated (per obs)      EDUCATION PROVIDED  Patient: Role of Occupational Therapy; Plan of Care; Discharge Recommendations; Functional Transfer Techniques; Fall Prevention; Posture/Positioning; Energy Conservation; Compensatory ADL Techniques; Proper Body Mechanics  Patient's Response to Education: Verbalized Understanding; Returned Demonstration    The patient's Approx Degree of Impairment: 32.79% has been calculated based on documentation in the [facility] '6 clicks' Inpatient Daily Activity Short Form.  Research supports that patients with this level of impairment may benefit from  discharge home.  Final disposition will be made by interdisciplinary medical team.    Patient End of Session: Needs met;Call light within reach;RN aware of session/findings;All patient questions and concerns addressed;In bed    Patient was able to achieve the following ...   Patient able to toilet transfer  safely and independently based on similar fx t/f's    Patient able to dress lower extremities   SUP    Patient/Caregiver able to demonstrate safety with ADLS  at previous functional level     Patient Evaluation Complexity Level:   Occupational Profile/Medical History LOW - Brief history including review of medical or therapy records    Specific performance deficits impacting engagement in ADL/IADL LOW  1 - 3 performance deficits    Client Assessment/Performance Deficits LOW - No comorbidities nor modifications of tasks    Clinical Decision Making LOW - Analysis of occupational profile, problem-focused assessments, limited treatment options    Overall Complexity LOW     OT Session Time  Therapeutic Activity: 15 minutes    DONTA Torres/L  [facility]  #[id number]

## 2024-04-01 NOTE — H&P
[facility]  part of [facility]    History & Physical    Byron Garcia Patient Status:  Observation    1985 MRN [id number]   Location [facility] 4W/SW/SE Attending Reji Torres MD   Hosp Day # 0 PCP RAJIV CALVILLO     Date:  3/31/2024  Date of Admission:  3/31/2024    History provided by: Pt, wife  Chief Complaint:     Chief Complaint   Patient presents with    Back Pain       HPI:   Byron Garcia is a(n) 38 year old male with hx of tobacco use and lumbar spine surgery presents with b/l LE pain and back pain. Pt has had a R hemilaminectomy for severe R L5 radiculopathy from hareniated disk in 2023. He subsequently had a revision L4-L5 laminectomy 2024 for recurrent pain. He now presents again with worsening radicular symptoms bilaterally down his legs, R worse than left. He denies any trauma or injury to the area. He denies any saddle anesthesia or loss of bowel/bladder function. He does endorse weakness of LE's, R worse than L. Neurosurgery has been consulted. Pt underwent repeat MRI today which showed severe central canal narrowing and severe right and moderate left neural foraminal narrowing at L4-L5, which has worsened compared to the scan on 3/22/24. Pt will be admitted for further evaluation and management.     History     Past Medical History:   Diagnosis Date    Eye infection, right      Past Surgical History:   Procedure Laterality Date    HAND/FINGER SURGERY UNLISTED      LUMBAR SPINE SURGERY  2023    L4-5 LUMBAR LAMINECTOMY, RESECTION OF DISC     Family History   Problem Relation Age of Onset    Cancer Father 62        Bladder     Social History:  Social History     Socioeconomic History    Marital status:    Tobacco Use    Smoking status: Every Day     Packs/day: .5     Types: Cigarettes    Smokeless tobacco: Never   Vaping Use    Vaping Use: Never used   Substance and Sexual Activity    Alcohol use: Yes    Drug use: Never      Social Determinants of Health     Food Insecurity: No Food Insecurity (2024)    Food Insecurity     Food Insecurity: Never true   Transportation Needs: No Transportation Needs (2024)    Transportation Needs     Lack of Transportation: No   Housing Stability: Low Risk  (2024)    Housing Stability     Housing Instability: No     Allergies/Medications:   Allergies: No Known Allergies  Medications Prior to Admission   Medication Sig    naproxen 500 MG Oral Tab Take 500 mg by mouth 2 (two) times daily with meals.    gabapentin 300 MG Oral Cap Start with night time dose only. If well tolerated, increase to two times daily. If well tolerated, increase to three times daily.    methylPREDNISolone 4 MG Oral Tablet Therapy Pack As directed    [] HYDROcodone-acetaminophen 5-325 MG Oral Tab Take 1 tablet by mouth every 6 (six) hours as needed for Pain.    ALPRAZolam 0.25 MG Oral Tab Take 1 tablet (0.25 mg total) by mouth 2 (two) times daily as needed.    [] acetaminophen 500 MG Oral Tab Take 2 tablets (1,000 mg total) by mouth every 6 (six) hours as needed for Pain.    buPROPion  MG Oral Tablet 24 Hr Take 1 tablet (300 mg total) by mouth before breakfast.       Review of Systems:   A comprehensive 14 point review of systems was completed.  Pertinent positives and negatives noted in the the HPI.     Physical Exam:   Vital Signs:  Blood pressure 127/87, pulse 78, temperature 98.4 °F (36.9 °C), temperature source Oral, resp. rate 18, weight 164 lb (74.4 kg), SpO2 95%.     Physical Exam:    General: NAD  Neck: No JVD  Lungs: CTA bilat  Heart: RRR, S1, S2  Abdomen: Soft, NT/ND, BS+x4  Pulses: 2+ bilat DP  Skin: no rashes or legions noted  Neurological:  AAOx3, RLE strength 3/5, LLE 4/5, sensation intact b/l  Results:     Lab Results   Component Value Date    WBC 10.4 2024    HGB 15.0 2024    HCT 44.9 2024    .0 2024    CREATSERUM 1.01 2024    BUN 15  03/31/2024     03/31/2024    K 3.7 03/31/2024     03/31/2024    CO2 32.0 03/31/2024     (H) 03/31/2024    CA 10.2 03/31/2024       MRI SPINE LUMBAR (CPT=72148)    Result Date: 3/31/2024  CONCLUSION:  1.  Post right L4-5 laminotomy.  Within the lateral body there is a stable tract of fluid extending from the skin surface to the laminotomy site, measuring approximately 5 x 2 x 2 cm. 2.  At L4-5 there has been a right-sided laminotomy.  Right paracentral disc protrusion with caudal extrusion.  Within the right laminotomy side blocking the entrance of the right neural foramen is a cystic focus measuring 16 x 9 x 13 mm, which could represent a small seroma or synovial cyst.  Collectively these findings cause severe central canal narrowing and severe right and moderate left neural foraminal narrowing.  These findings also potentially contact the transitioning bilateral L5 nerve roots.  The size of the disc protrusion as well as the degree of central canal narrowing is worse compared to recent MRI from 3/22/24.    Dictated by (CST): Chang Coe MD on 3/31/2024 at 6:41 PM     Finalized by (CST): Chang Coe MD on 3/31/2024 at 6:49 PM               Assessment/Plan:   Intractable low back pain  Also with pain referring down to b/l LE's  Due to worsening central canal narrowing, severe R and moderate L neural foraminal narrowing  In the setting of previous laminectomy x 2  Neurosurgery has been consulted, await eval  Cont pain control  Cont gabapentin   Cont PT/OT    Tobacco use  Counseled on cessation     DVT Ppx: SCDs  Full code    MDM: [facility] Reji Torres MD  3/31/2024

## 2024-04-01 NOTE — INTERVAL H&P NOTE
Pre-op Diagnosis: Intractable low back pain    The above referenced H&P was reviewed by Tavon Pittman DO on 4/1/2024, the patient was examined and no significant changes have occurred in the patient's condition since the H&P was performed.  I discussed with the patient and/or legal representative the potential benefits, risks and side effects of this procedure; the likelihood of the patient achieving goals; and potential problems that might occur during recuperation.  I discussed reasonable alternatives to the procedure, including risks, benefits and side effects related to the alternatives and risks related to not receiving this procedure.  We will proceed with procedure as planned.

## 2024-04-01 NOTE — PLAN OF CARE
Pt alert and oriented x4. On room air. VSS. Pt had Bilateral L5 transforaminal epidural steroid injection today. Tolerated well. No longer has numbness to BLE. Pt cleared for discharge. Instructions provided. Escorted to private vehicle via wheelchair.     Problem: Patient Centered Care  Goal: Patient preferences are identified and integrated in the patient's plan of care  Description: Interventions:  - What would you like us to know as we care for you?   - Provide timely, complete, and accurate information to patient/family  - Incorporate patient and family knowledge, values, beliefs, and cultural backgrounds into the planning and delivery of care  - Encourage patient/family to participate in care and decision-making at the level they choose  - Honor patient and family perspectives and choices  4/1/2024 1647 by Audrey Al RN  Outcome: Completed     Problem: PAIN - ADULT  Goal: Verbalizes/displays adequate comfort level or patient's stated pain goal  Description: INTERVENTIONS:  - Encourage pt to monitor pain and request assistance  - Assess pain using appropriate pain scale  - Administer analgesics based on type and severity of pain and evaluate response  - Implement non-pharmacological measures as appropriate and evaluate response  - Consider cultural and social influences on pain and pain management  - Manage/alleviate anxiety  - Utilize distraction and/or relaxation techniques  - Monitor for opioid side effects  - Notify MD/LIP if interventions unsuccessful or patient reports new pain  - Anticipate increased pain with activity and pre-medicate as appropriate  4/1/2024 1647 by Audrey Al, RN  Outcome: Completed     Problem: RISK FOR INFECTION - ADULT  Goal: Absence of fever/infection during anticipated neutropenic period  Description: INTERVENTIONS  - Monitor WBC  - Administer growth factors as ordered  - Implement neutropenic guidelines  4/1/2024 1647 by Audrey Al, RN  Outcome:  Completed     Problem: SAFETY ADULT - FALL  Goal: Free from fall injury  Description: INTERVENTIONS:  - Assess pt frequently for physical needs  - Identify cognitive and physical deficits and behaviors that affect risk of falls.  - Bowman fall precautions as indicated by assessment.  - Educate pt/family on patient safety including physical limitations  - Instruct pt to call for assistance with activity based on assessment  - Modify environment to reduce risk of injury  - Provide assistive devices as appropriate  - Consider OT/PT consult to assist with strengthening/mobility  - Encourage toileting schedule  4/1/2024 1647 by Audrey Al, RN  Outcome: Completed     Problem: DISCHARGE PLANNING  Goal: Discharge to home or other facility with appropriate resources  Description: INTERVENTIONS:  - Identify barriers to discharge w/pt and caregiver  - Include patient/family/discharge partner in discharge planning  - Arrange for needed discharge resources and transportation as appropriate  - Identify discharge learning needs (meds, wound care, etc)  - Arrange for interpreters to assist at discharge as needed  - Consider post-discharge preferences of patient/family/discharge partner  - Complete POLST form as appropriate  - Assess patient's ability to be responsible for managing their own health  - Refer to Case Management Department for coordinating discharge planning if the patient needs post-hospital services based on physician/LIP order or complex needs related to functional status, cognitive ability or social support system  4/1/2024 1647 by Audrey Al, RN  Outcome: Completed

## 2024-04-02 NOTE — PAYOR COMM NOTE
--------------  ADMISSION REVIEW     Payor: RAJAN SUÁREZ  Subscriber #:  [id number]  Authorization Number: [id number]    Admit date: 3/31/24  Admit time:  8:08 PM       REVIEW DOCUMENTATION:     Patient Seen in: [facility] Emergency Department    History     Chief Complaint   Patient presents with    Back Pain       HPI    The patient presents to the ED complaining of severe lower back pain that is much worse with standing.  He states when lying down his pain is almost completely gone.  He had recent back surgery on 2/28/2024 and 1 prior back surgery in November of last year.  He states since his back surgery his pain is increased and he has had pain shooting down his right leg and had MRI for this last week.  Today he has pain to down his left leg as well.  He states no bowel bladder symptoms and can still walk but has to shuffle due to the pain.  No other complaints.    History reviewed.   Past Medical History:   Diagnosis Date    Eye infection, right        History reviewed.   Past Surgical History:   Procedure Laterality Date    HAND/FINGER SURGERY UNLISTED  2008    LUMBAR SPINE SURGERY  11/08/2023    L4-5 LUMBAR LAMINECTOMY, RESECTION OF DISC       Physical Exam     ED Triage Vitals [03/31/24 1544]   /80   Pulse 94   Resp 18   Temp 98.4 °F (36.9 °C)   Temp src Temporal   SpO2 97 %   O2 Device None (Room air)       Physical Exam  Vitals and nursing note reviewed.   Constitutional:       General: He is not in acute distress.     Appearance: He is well-developed. He is not ill-appearing.   HENT:      Head: Normocephalic and atraumatic.   Eyes:      General:         Right eye: No discharge.         Left eye: No discharge.      Conjunctiva/sclera: Conjunctivae normal.   Neck:      Trachea: No tracheal deviation.   Cardiovascular:      Rate and Rhythm: Normal rate.   Pulmonary:      Effort: Pulmonary effort is normal. No respiratory distress.      Breath sounds: No stridor.   Abdominal:      General: There  is no distension.      Palpations: Abdomen is soft.   Musculoskeletal:         General: No deformity.   Skin:     General: Skin is warm and dry.   Neurological:      Mental Status: He is alert and oriented to person, place, and time.      Sensory: No sensory deficit.      Motor: No weakness.   Psychiatric:         Mood and Affect: Mood normal.         Behavior: Behavior normal.         ED Course        Labs Reviewed   BASIC METABOLIC PANEL (8) - Abnormal; Notable for the following components:       Result Value    Glucose 106 (*)     All other components within normal limits   Imaging Results Available and Reviewed while in ED: MRI SPINE LUMBAR (CPT=72148)    Result Date: 3/31/2024  CONCLUSION:  1.  Post right L4-5 laminotomy.  Within the lateral body there is a stable tract of fluid extending from the skin surface to the laminotomy site, measuring approximately 5 x 2 x 2 cm. 2.  At L4-5 there has been a right-sided laminotomy.  Right paracentral disc protrusion with caudal extrusion.  Within the right laminotomy side blocking the entrance of the right neural foramen is a cystic focus measuring 16 x 9 x 13 mm, which could represent a small seroma or synovial cyst.  Collectively these findings cause severe central canal narrowing and severe right and moderate left neural foraminal narrowing.  These findings also potentially contact the transitioning bilateral L5 nerve roots.  The size of the disc protrusion as well as the degree of central canal narrowing is worse compared to recent MRI from 3/22/24.        ED Medications Administered:   Medications   morphINE PF 2 MG/ML injection 1 mg ( Intravenous See Alternative 3/31/24 2107)     Or   morphINE PF 2 MG/ML injection 2 mg ( Intravenous See Alternative 3/31/24 2107)     Or   morphINE PF 4 MG/ML injection 4 mg (4 mg Intravenous Given 3/31/24 2107)   acetaminophen (Tylenol) tab 650 mg ( Oral See Alternative 3/31/24 2153)     Or   HYDROcodone-acetaminophen (Norco) 5-325  MG per tab 1 tablet ( Oral See Alternative 3/31/24 2153)     Or   HYDROcodone-acetaminophen (Norco) 5-325 MG per tab 2 tablet (2 tablets Oral Given 3/31/24 2153)   polyethylene glycol (PEG 3350) (Miralax) 17 g oral packet 17 g (has no administration in time range)   sennosides (Senokot) tab 17.2 mg (has no administration in time range)   bisacodyl (Dulcolax) 10 MG rectal suppository 10 mg (has no administration in time range)   fleet enema (Fleet) 7-19 GM/118ML rectal enema 133 mL (has no administration in time range)   ondansetron (Zofran) 4 MG/2ML injection 4 mg (has no administration in time range)   prochlorperazine (Compazine) 10 MG/2ML injection 5 mg (has no administration in time range)   ALPRAZolam (Xanax) tab 0.25 mg (has no administration in time range)   gabapentin (Neurontin) cap 300 mg (300 mg Oral Given 3/31/24 2153)   buPROPion ER (Wellbutrin XL) 24 hr tab 300 mg (has no administration in time range)   morphINE PF 4 MG/ML injection 4 mg (4 mg Intravenous Given 3/31/24 1926)   ondansetron (Zofran) 4 MG/2ML injection 4 mg (4 mg Intravenous Given 3/31/24 1926)       Disposition and Plan     Clinical Impression:  1. Intractable low back pain    2. Spinal stenosis of lumbar region without neurogenic claudication        Disposition:  Admit    Signed by Shahid Mcgraw MD on 3/31/2024 11:49 PM           HISTORY AND PHYSICAL      Chief Complaint   Patient presents with    Back Pain         HPI:   Byron Garcia is a(n) 38 year old male with hx of tobacco use and lumbar spine surgery presents with b/l LE pain and back pain. Pt has had a R hemilaminectomy for severe R L5 radiculopathy from hareniated disk in Nov 2023. He subsequently had a revision L4-L5 laminectomy Feb 2024 for recurrent pain. He now presents again with worsening radicular symptoms bilaterally down his legs, R worse than left. He denies any trauma or injury to the area. He denies any saddle anesthesia or loss of bowel/bladder function.  He does endorse weakness of LE's, R worse than L. Neurosurgery has been consulted. Pt underwent repeat MRI today which showed severe central canal narrowing and severe right and moderate left neural foraminal narrowing at L4-L5, which has worsened compared to the scan on 3/22/24. Pt will be admitted for further evaluation and management.       Lab Results   Component Value Date     WBC 10.4 03/31/2024     HGB 15.0 03/31/2024     HCT 44.9 03/31/2024     .0 03/31/2024     CREATSERUM 1.01 03/31/2024     BUN 15 03/31/2024      03/31/2024     K 3.7 03/31/2024      03/31/2024     CO2 32.0 03/31/2024      (H) 03/31/2024     CA 10.2 03/31/2024         ssessment/Plan:   Intractable low back pain  Also with pain referring down to b/l LE's  Due to worsening central canal narrowing, severe R and moderate L neural foraminal narrowing  In the setting of previous laminectomy x 2  Neurosurgery has been consulted, await eval  Cont pain control  Cont gabapentin   Cont PT/OT     Tobacco use  Counseled on cessation      DVT Ppx: SCDs          4-1-24  Operative Report     DATE OF PROCEDURE: 4/1/2024   PREOPERATIVE DIAGNOSES: Intractable low back pain   POSTOPERATIVE DIAGNOSES:   Intractable low back pain   PROCEDURES: Bilateral L5 transforaminal epidural steroid injections done under fluoroscopic guidance with contrast enhancement.             MEDICATIONS ADMINISTERED IN LAST 1 DAY:          betamethasone sodium phosphate & acetate (Celestone) 6 (3-3) MG/ML injection       Date Action Dose Route User    Discharged on 4/1/2024 4/1/2024 1258 Given 3 mg Intramuscular (Back) Tavon Pittman DO          buPROPion ER (Wellbutrin XL) 24 hr tab 300 mg       Date Action Dose Route User    Discharged on 4/1/2024 4/1/2024 1115 Given 300 mg Oral Audrey Al RN          gabapentin (Neurontin) cap 300 mg       Date Action Dose Route User    Discharged on 4/1/2024 4/1/2024 1115 Given 300 mg Oral Servando  ELKE Ventura          gabapentin (Neurontin) cap 300 mg       Date Action Dose Route User    Discharged on 4/1/2024 4/1/2024 1652 Given 300 mg Oral Audrey Al RN          HYDROcodone-acetaminophen (Norco) 5-325 MG per tab 2 tablet       Date Action Dose Route User    Discharged on 4/1/2024 4/1/2024 1116 Given 2 tablet Oral Audrey Al RN          iopamidol (ISOVUE-M 200) 41% injection       Date Action Dose Route User    Discharged on 4/1/2024 4/1/2024 1257 Given 8 mL Intrathecal (Back) Tavon Pittman, DO          lidocaine PF (Xylocaine-MPF) 1% injection       Date Action Dose Route User    Discharged on 4/1/2024 4/1/2024 1257 Given 12 mL (none) Tavon Pittman, DO          morphINE PF 2 MG/ML injection 2 mg       Date Action Dose Route User    Discharged on 4/1/2024 4/1/2024 1139 Given 2 mg Intravenous Audrey Al RN          triamcinolone acetonide (Kenalog-40) 40 MG/ML injection       Date Action Dose Route User    Discharged on 4/1/2024 4/1/2024 1258 Given 40 mg Intramuscular (Back) Tavon Pittman, DO            Vitals (last day) before discharge       Date/Time Temp Pulse Resp BP SpO2 Weight O2 Device O2 Flow Rate (L/min) [facility]    04/01/24 1551 98.4 °F (36.9 °C) 88 18 133/87 94 % -- None (Room air) --     04/01/24 1250 -- -- -- -- 94 % -- -- -- AF    04/01/24 1245 -- -- -- -- 93 % -- -- -- AF    04/01/24 1240 -- -- -- -- 93 % -- -- -- AF    04/01/24 0808 98.4 °F (36.9 °C) 75 18 130/83 94 % -- None (Room air) -- LB    04/01/24 0413 98.4 °F (36.9 °C) 75 18 120/74 93 % -- None (Room air) --     03/31/24 2004 -- -- -- -- -- 164 lb -- -- AM    03/31/24 1957 98.4 °F (36.9 °C) 78 18 127/87 95 % 164 lb None (Room air) -- DG    03/31/24 1930 -- 78 18 130/92 98 % -- None (Room air) -- SZ    03/31/24 1845 -- 89 -- 123/82 93 % -- None (Room air) -- AK    03/31/24 1544 98.4 °F (36.9 °C) 94 18 127/80 97 % -- None (Room air) -- SL

## 2024-04-09 ENCOUNTER — MED REC SCAN ONLY (OUTPATIENT)
Dept: PHYSICAL MEDICINE AND REHAB | Facility: CLINIC | Age: 39
End: 2024-04-09

## 2024-04-15 ENCOUNTER — TELEMEDICINE (OUTPATIENT)
Dept: PHYSICAL MEDICINE AND REHAB | Facility: CLINIC | Age: 39
End: 2024-04-15
Payer: COMMERCIAL

## 2024-04-15 DIAGNOSIS — M54.16 CHRONIC RIGHT-SIDED LUMBAR RADICULOPATHY: Primary | ICD-10-CM

## 2024-04-15 RX ORDER — GABAPENTIN 600 MG/1
600 TABLET ORAL 3 TIMES DAILY
Qty: 90 TABLET | Refills: 0 | Status: SHIPPED | OUTPATIENT
Start: 2024-04-15

## 2024-04-15 NOTE — PATIENT INSTRUCTIONS
-Follow up in 4 weeks  -Gabapentin to be increased to 600mg three times daily  -Will try to return back to work with full duty in 4 weeks if possible  -Keep up follow up with neurosurgery

## 2024-04-15 NOTE — PROGRESS NOTES
[facility]    Telemedicine Visit - Follow Up Evaluation    Telehealth Verbal Consent   I conducted a telehealth visit with Byron Garcia today, 04/15/24, which was completed using two-way, real-time interactive audio and video communication. This has been done in good sharif to provide continuity of care in the best interest of the provider-patient relationship, due to the COVID -19 public health crisis/national emergency where restrictions of face-to-face office visits are ongoing. Every conscious effort was taken to allow for sufficient and adequate time to complete the visit.  The patient was made aware of the limitations of the telehealth visit, including treatment limitations as no physical exam could be performed.  The patient was advised to call 911 or to go to the ER in case there was an emergency.  The patient was also advised of the potential privacy & security concerns related to the telehealth platform.   The patient was made aware of where to find [facility]'s notice of privacy practices, telehealth consent form and other related consent forms and documents.  which are located on the [facility] website. The patient verbally agreed to telehealth consent form, related consents and the risks discussed.    Lastly, the patient confirmed that they were in [place].   Included in this visit, time may have been spent reviewing labs, medications, radiology tests and decision making. Appropriate medical decision-making and tests are ordered as detailed in the plan of care above.  Coding/billing information is submitted for this visit based on complexity of care and/or time spent for the visit.      HISTORY OF PRESENT ILLNESS:     Patient is following up low back pain with lumbar radiculopathy.  He states overall he has noted good improvement after the epidural injection.  He states the radicular symptoms have improved however he still has axial low back pain.  He has been  attending physical therapy and doing home exercises.  Right now, they are focusing more on muscle spasm management with dry needling and has not been pushing it too hard in therapy.  He continues gabapentin 300 mg 3 times daily which she is tolerating well.  He denies any changes in strength or bowel bladder.  Overall he is feeling encouraged with his recent progress.  He still has very restricted range of motion at the lumbar spine.      IMAGING:   No new imaging    All imaging results were reviewed and discussed with patient.      ASSESSMENT:     1. Chronic right-sided lumbar radiculopathy          PLAN:   Byron Garcia is a 38 year old male following up for low back pain with lumbar radiculopathy.  He has responded well to the recent epidural injection and facet injection.  Recommend that he continue working on dry needling and myofascial component of pain with physical therapy.  I do believe some of his pain is heightened with underlying anxiety which has improved with the gabapentin and I have recommended increasing this to 600 mg 3 times daily.  Recommend that he continue working with restrictions of no heavy lifting as he is currently doing and follow-up with me in 4 weeks in the office.  The goal will be to progress him slowly over the next 4 weeks.  If he is able to do so we will try to return him back to work with less restrictions in 4 weeks.      Follow-up:  1 month    We discussed that a telemedicine visit is in place of an office visit; however, this limits the ability to perform a thorough physical examination which may affect objective findings related to a specific condition and can affect treatment.    The patient verbalized understanding with this plan and was in agreement.  There are no barriers to learning.  All questions were answered.  Please note Dragon dictation software was used to dictate this note which may result in inadvertent typos.    Tavon Pittman D.O. FAAPMR & CAQSM  Physical  Medicine and Rehabilitation/Sports Medicine    PAST MEDICAL HISTORY:     Past Medical History:    Eye infection, right         PAST SURGICAL HISTORY:     Past Surgical History:   Procedure Laterality Date    Hand/finger surgery unlisted  2008    Lumbar spine surgery  11/08/2023    L4-5 LUMBAR LAMINECTOMY, RESECTION OF DISC         CURRENT MEDICATIONS:     Current Outpatient Medications   Medication Sig Dispense Refill    gabapentin 600 MG Oral Tab Take 1 tablet (600 mg total) by mouth 3 (three) times daily. 90 tablet 0    buPROPion  MG Oral Tablet 24 Hr Take 1 tablet (300 mg total) by mouth before breakfast.           ALLERGIES:   No Known Allergies      FAMILY HISTORY:     Family History   Problem Relation Age of Onset    Cancer Father 62        Bladder          SOCIAL HISTORY:     Social History     Socioeconomic History    Marital status:    Tobacco Use    Smoking status: Every Day     Current packs/day: 0.50     Types: Cigarettes    Smokeless tobacco: Never   Vaping Use    Vaping status: Never Used   Substance and Sexual Activity    Alcohol use: Yes    Drug use: Never     Social Determinants of Health     Food Insecurity: No Food Insecurity (3/31/2024)    Food Insecurity     Food Insecurity: Never true   Transportation Needs: No Transportation Needs (3/31/2024)    Transportation Needs     Lack of Transportation: No   Housing Stability: Low Risk  (3/31/2024)    Housing Stability     Housing Instability: No          REVIEW OF SYSTEMS:   As noted in HPI      PHYSICAL EXAM:   General: No immediate distress  Head: Normocephalic/ Atraumatic  Eyes: Extra-occular movements intact  Ears/Nose/Throat:  External appearance identifies normal appearance without obvious deformity  Cardiovascular: No cyanosis, clubbing or edema  Respiratory: Non-labored respirations  Skin: No lesions noted   Neurological: alert & oriented x 3, attentive, able to follow commands, comprehention intact, spontaneous speech  intact  Psychiatric: Mood and affect appropriate  Musculoskeletal Exam:  No change since last exam        LABS:   No results found for: \"EAG\", \"A1C\"  Lab Results   Component Value Date    WBC 9.3 04/01/2024    RBC 4.89 04/01/2024    HGB 15.1 04/01/2024    HCT 44.7 04/01/2024    MCV 91.4 04/01/2024    MCH 30.9 04/01/2024    MCHC 33.8 04/01/2024    RDW 12.3 04/01/2024    .0 04/01/2024     Lab Results   Component Value Date     (H) 04/01/2024    BUN 14 04/01/2024    BUNCREA 14.3 04/01/2024    CREATSERUM 0.98 04/01/2024    ANIONGAP 6 04/01/2024    CA 10.0 04/01/2024    OSMOCALC 289 04/01/2024    ALKPHO 78 04/01/2024    AST 13 04/01/2024    ALT 11 04/01/2024    BILT 0.5 04/01/2024    TP 7.2 04/01/2024    ALB 4.5 04/01/2024    GLOBULIN 2.7 (L) 04/01/2024     04/01/2024    K 4.0 04/01/2024     04/01/2024    CO2 29.0 04/01/2024     No results found for: \"PTP\", \"PT\", \"INR\"  No results found for: \"VITD\", \"QVITD\", \"[id number]\"

## 2024-04-22 ENCOUNTER — TELEPHONE (OUTPATIENT)
Dept: SURGERY | Facility: CLINIC | Age: 39
End: 2024-04-22

## 2024-04-22 NOTE — TELEPHONE ENCOUNTER
[place] [facility] received reports from [facility].     Date of Service: 04/09/2024 - Physical Therapy Initial Evaluation    Date of Service: 04/09/2024 - Physical Therapy Plan of Care    Hang Rice, Physician Assistant, reviewed reports.     Reports sent to scanning department.

## 2024-05-01 ENCOUNTER — TELEPHONE (OUTPATIENT)
Dept: PHYSICAL MEDICINE AND REHAB | Facility: CLINIC | Age: 39
End: 2024-05-01

## 2024-05-01 NOTE — TELEPHONE ENCOUNTER
Incoming call from patient who reports he is having some low back pain. Pt would like to take muscle relaxer but is concerned regarding taking with Gabapentin. Pt currently taking 600MG nightly and 300MG BID, pt denies any adverse or side effects from this increase. Pt not currently comfortable fully increasing Gabapentin 600MG TID.   Pt educated that Robaxin would be best to be used during the day and Cyclobenzaprine at night. Both should only be used as needed and neither should be taken while driving. Informed pt that if nightly muscle relaxer is needed, take somewhat  from the Gabapentin 45 min - 1 hr. Pt also informed d/t hx GERD that advil/aleve/naproxen are not recommended, but if absolutely needed take with food. RN recommended Tylenol PRN. Pt verbalized understanding to all education listed above, denies further questions. Nothing further needed at this time.

## 2024-05-02 ENCOUNTER — MED REC SCAN ONLY (OUTPATIENT)
Dept: PHYSICAL MEDICINE AND REHAB | Facility: CLINIC | Age: 39
End: 2024-05-02

## 2024-05-03 ENCOUNTER — TELEPHONE (OUTPATIENT)
Dept: SURGERY | Facility: CLINIC | Age: 39
End: 2024-05-03

## 2024-05-03 ENCOUNTER — OFFICE VISIT (OUTPATIENT)
Dept: SURGERY | Facility: CLINIC | Age: 39
End: 2024-05-03
Payer: COMMERCIAL

## 2024-05-03 VITALS
BODY MASS INDEX: 26 KG/M2 | HEART RATE: 86 BPM | WEIGHT: 168 LBS | DIASTOLIC BLOOD PRESSURE: 82 MMHG | SYSTOLIC BLOOD PRESSURE: 121 MMHG

## 2024-05-03 DIAGNOSIS — Z98.890 STATUS POST LAMINECTOMY: ICD-10-CM

## 2024-05-03 DIAGNOSIS — Z98.890 STATUS POST LUMBAR MICRODISCECTOMY: Primary | ICD-10-CM

## 2024-05-03 PROCEDURE — 3074F SYST BP LT 130 MM HG: CPT

## 2024-05-03 PROCEDURE — 3079F DIAST BP 80-89 MM HG: CPT

## 2024-05-03 PROCEDURE — 99024 POSTOP FOLLOW-UP VISIT: CPT

## 2024-05-03 RX ORDER — HYDROCODONE BITARTRATE AND ACETAMINOPHEN 5; 325 MG/1; MG/1
1 TABLET ORAL EVERY 4 HOURS PRN
Qty: 30 TABLET | Refills: 0 | Status: ON HOLD | OUTPATIENT
Start: 2024-05-03 | End: 2024-05-08

## 2024-05-03 NOTE — TELEPHONE ENCOUNTER
Patient is requesting to speak with clinical staff in regards to pain level not being tolerable, in his lower back into sciatic an down the left leg. Patient states that the last 48 hours has been pretty bad, unable to move off of couch. Patient states he can walk just very painful debating going back to the ER.     Patient states he was last in the ER on 04/01/2024 and received an Epidural shot.     PSR checked the schedule for Dr. Dinero and Dru Kruger an patient informed me that he does not feel he can wait until 05/07/2024 for the first available apt.

## 2024-05-03 NOTE — PROGRESS NOTES
Last office visit: 3/21/24  Last procedure: 2/28/24  Most recent imaging dated on: 3/31/24  Physical Therapy / Injections: Patient has been compliant and completed Physical Therapy / Injections.   Numbness / Tingling: Patient reports numbness and tingling on left leg.  Pain Level: 7/10. Patient states that they are having pain located on their lower back and left leg.

## 2024-05-03 NOTE — PROGRESS NOTES
[facility]        [facility]      [address]  [address]  [place] [address]    PHONE  [phone]          FAX  [phone]    [email or web address]      OFFICE FOLLOW-UP NOTE            Byron Garcia    : 1985    MRN: [id number]  CSN: [id number]      PCP: RAJIV CALVILLO  Referring Provider: No ref. provider found    Insurance: Payor: [facility] PPO / Plan: BCBS PPO / Product Type: PPO /           Date of Visit: 5/3/2024    Reason for Visit:   Chief Complaint    Follow - Up                         History of Present Care:  Byron Garcia is a a(n) 38 year old, male who is now 2 months status post redo L4-5 microdiscectomy.  Patient returns to the office due to severe low back pain with sitting and standing as well as left lower extremity pain which radiates to the buttocks and into the top of the foot.  Patient has been working with physical therapy and undergoing dry needling as well as manual massage.  He has also had epidural steroid injection and facet cyst aspiration. Patient was doing quite well following this procedure but quickly became acutely worse.  He does endorse some numbness to the left lateral thigh and anterior shin.  Patient denies bowel or bladder changes or weakness in the lower extremities.      History:   Past Medical History:    Eye infection, right      Past Surgical History:   Procedure Laterality Date    Hand/finger surgery unlisted      Lumbar spine surgery  2023    L4-5 LUMBAR LAMINECTOMY, RESECTION OF DISC      Family History   Problem Relation Age of Onset    Cancer Father 62        Bladder      Social History     Socioeconomic History    Marital status:      Spouse name: Not on file    Number of children: Not on file    Years of education: Not on file    Highest education level: Not on file   Occupational History    Not on file   Tobacco Use    Smoking status:  Every Day     Current packs/day: 0.50     Types: Cigarettes    Smokeless tobacco: Never   Vaping Use    Vaping status: Never Used   Substance and Sexual Activity    Alcohol use: Yes    Drug use: Never    Sexual activity: Not on file   Other Topics Concern    Not on file   Social History Narrative    Not on file     Social Determinants of Health     Financial Resource Strain: Low Risk  (4/20/2024)    Received from [facility]    Overall Financial Resource Strain (CARDIA)     Difficulty of Paying Living Expenses: Not hard at all   Food Insecurity: No Food Insecurity (4/20/2024)    Received from [facility]    Hunger Vital Sign     Worried About Running Out of Food in the Last Year: Never true     Ran Out of Food in the Last Year: Never true   Transportation Needs: No Transportation Needs (4/20/2024)    Received from [facility]    PRAPARE - Transportation     Lack of Transportation (Medical): No     Lack of Transportation (Non-Medical): No   Physical Activity: Not on file   Stress: Not on file   Social Connections: Not on file   Housing Stability: Low Risk  (4/20/2024)    Received from [facility]    Housing Stability Vital Sign     Unable to Pay for Housing in the Last Year: No     Number of Places Lived in the Last Year: 1     In the last 12 months, was there a time when you did not have a steady place to sleep or slept in a shelter (including now)?: No        Allergies:  No Known Allergies      Medications:  Current Outpatient Medications   Medication Sig Dispense Refill    gabapentin 600 MG Oral Tab Take 1 tablet (600 mg total) by mouth 3 (three) times daily. 90 tablet 0    buPROPion  MG Oral Tablet 24 Hr Take 1 tablet (300 mg total) by mouth before breakfast.          Review of Systems:  A 10-point system was reviewed.  Pertinent positives and negatives are noted in HPI.      Physical Exam:  /82   Pulse 86   Wt 168  lb (76.2 kg)   BMI 26.31 kg/m²         Neurological Exam:    Straight leg raise negative bilaterally    Motor Strength:  Strength in lower extremities 5 out of 5 with encouragement    Sensation to light touch:  Intact in bilateral lower extremities throughout    Incision:  Clean, dry, intact      Abdomen:  Soft, non-distended, non-tender, with no rebound or guarding.  No peritoneal signs.     Extremities:  Non-tender, no lower extremity edema noted.      Labs:  CBC:  Lab Results   Component Value Date    WBC 9.3 04/01/2024    HGB 15.1 04/01/2024    HCT 44.7 04/01/2024    MCV 91.4 04/01/2024    .0 04/01/2024      BMG:  Lab Results   Component Value Date     04/01/2024    K 4.0 04/01/2024    CO2 29.0 04/01/2024     04/01/2024    BUN 14 04/01/2024      INR:  No results found for: \"INR\", \"PROTIME\"       Diagnostics:  No new imaging to review    Diagnosis:  1. Status post lumbar microdiscectomy  - MRI SPINE LUMBAR (CPT=72148); Future  - X-RAY LUMBAR SPINE FLEX/EXTEN; Future    2. Status post laminectomy  - MRI SPINE LUMBAR (CPT=72148); Future  - X-RAY LUMBAR SPINE FLEX/EXTEN; Future      Assessment/Plan:  Byron Garcia returns to the office 2 months following redo L4-5 microdiscectomy.  Patient endorses acute worsening of low back pain and left lower extremity pain.  We previously discussed at the time of redo microdiscectomy that patient would qualify for either TLIF versus MLD.  We decided to undergo less invasive procedure in the hopes of curative low back pain and right lower extremity pain.  Patient's right lower extremity radiculopathy has resolved but now endorses left lower extremity discomfort which is concerning for reherniation.  Due to patient's acute worsening in the last 10 days I would like to order an updated MRI of the lumbar spine.  I will also order flexion-extension x-rays of the lumbar spine to rule out instability.  Will plan to see patient back in the office for imaging  review.    More than 30 minutes were spent during this visit and the coordination of this patient's care. All questions and concerns were addressed. We appreciate the opportunity to participate in the care of this patient. Please do not hesitate to call our office ([phone]) with any issues.    This note has been dictated utilizing voice recognition software. Unfortunately, this may lead to occasional typographical errors. If there are any questions regarding this, please do not hesitate to contact our office.           Hang Rice PA-C    5/3/2024  12:05 PM

## 2024-05-03 NOTE — PATIENT INSTRUCTIONS

## 2024-05-03 NOTE — TELEPHONE ENCOUNTER
Called and spoke to patient's wife as patient was in the shower.  She states the patient has been in the same pain as he was before the last surgery and would like to discuss having a fusion as previously offered by Dr Dinero.  Discussed with JOSE A Puente.  Pt should come in today for an appointment.  Apt made.  She was appreciative.

## 2024-05-03 NOTE — TELEPHONE ENCOUNTER
Pt called while on the other line with CS. Pt states was told to get MRI this weekend but was not ordered as stat.Pt states that CS changed xray order and for provider to confirm is ok.

## 2024-05-03 NOTE — TELEPHONE ENCOUNTER
Discussed with JOSE A Puente.  We cannot change order to STAT unless the test is emergent.  Pt does not have red flag symptoms.  He should get the MRI and xray as planned on 5-6-24.  Left detailed message on identified voicemail.

## 2024-05-04 ENCOUNTER — APPOINTMENT (OUTPATIENT)
Dept: MRI IMAGING | Facility: HOSPITAL | Age: 39
DRG: 867 | End: 2024-05-04
Attending: EMERGENCY MEDICINE
Payer: COMMERCIAL

## 2024-05-04 ENCOUNTER — HOSPITAL ENCOUNTER (INPATIENT)
Facility: HOSPITAL | Age: 39
LOS: 4 days | Discharge: HOME | DRG: 867 | End: 2024-05-08
Attending: EMERGENCY MEDICINE | Admitting: INTERNAL MEDICINE
Payer: COMMERCIAL

## 2024-05-04 DIAGNOSIS — M46.46 DISCITIS OF LUMBAR REGION: Primary | ICD-10-CM

## 2024-05-04 LAB
ANION GAP SERPL CALC-SCNC: 6 MMOL/L (ref 0–18)
BASOPHILS # BLD AUTO: 0.05 X10(3) UL (ref 0–0.2)
BASOPHILS NFR BLD AUTO: 0.7 %
BILIRUB UR QL: NEGATIVE
BUN BLD-MCNC: 15 MG/DL (ref 9–23)
BUN/CREAT SERPL: 15.8 (ref 10–20)
CALCIUM BLD-MCNC: 9.7 MG/DL (ref 8.7–10.4)
CHLORIDE SERPL-SCNC: 106 MMOL/L (ref 98–112)
CLARITY UR: CLEAR
CO2 SERPL-SCNC: 30 MMOL/L (ref 21–32)
CREAT BLD-MCNC: 0.95 MG/DL
DEPRECATED RDW RBC AUTO: 42.5 FL (ref 35.1–46.3)
EGFRCR SERPLBLD CKD-EPI 2021: 104 ML/MIN/1.73M2 (ref 60–?)
EOSINOPHIL # BLD AUTO: 0.12 X10(3) UL (ref 0–0.7)
EOSINOPHIL NFR BLD AUTO: 1.8 %
ERYTHROCYTE [DISTWIDTH] IN BLOOD BY AUTOMATED COUNT: 13 % (ref 11–15)
GLUCOSE BLD-MCNC: 92 MG/DL (ref 70–99)
GLUCOSE UR-MCNC: NORMAL MG/DL
HCT VFR BLD AUTO: 42.2 %
HGB BLD-MCNC: 14.5 G/DL
HGB UR QL STRIP.AUTO: NEGATIVE
IMM GRANULOCYTES # BLD AUTO: 0.02 X10(3) UL (ref 0–1)
IMM GRANULOCYTES NFR BLD: 0.3 %
KETONES UR-MCNC: NEGATIVE MG/DL
LEUKOCYTE ESTERASE UR QL STRIP.AUTO: NEGATIVE
LYMPHOCYTES # BLD AUTO: 1.47 X10(3) UL (ref 1–4)
LYMPHOCYTES NFR BLD AUTO: 21.7 %
MCH RBC QN AUTO: 31.2 PG (ref 26–34)
MCHC RBC AUTO-ENTMCNC: 34.4 G/DL (ref 31–37)
MCV RBC AUTO: 90.8 FL
MONOCYTES # BLD AUTO: 0.48 X10(3) UL (ref 0.1–1)
MONOCYTES NFR BLD AUTO: 7.1 %
NEUTROPHILS # BLD AUTO: 4.64 X10 (3) UL (ref 1.5–7.7)
NEUTROPHILS # BLD AUTO: 4.64 X10(3) UL (ref 1.5–7.7)
NEUTROPHILS NFR BLD AUTO: 68.4 %
NITRITE UR QL STRIP.AUTO: NEGATIVE
OSMOLALITY SERPL CALC.SUM OF ELEC: 294 MOSM/KG (ref 275–295)
PH UR: 7.5 [PH] (ref 5–8)
PLATELET # BLD AUTO: 202 10(3)UL (ref 150–450)
POTASSIUM SERPL-SCNC: 4.2 MMOL/L (ref 3.5–5.1)
PROT UR-MCNC: NEGATIVE MG/DL
RBC # BLD AUTO: 4.65 X10(6)UL
SODIUM SERPL-SCNC: 142 MMOL/L (ref 136–145)
SP GR UR STRIP: 1.01 (ref 1–1.03)
UROBILINOGEN UR STRIP-ACNC: NORMAL
WBC # BLD AUTO: 6.8 X10(3) UL (ref 4–11)

## 2024-05-04 PROCEDURE — 99223 1ST HOSP IP/OBS HIGH 75: CPT | Performed by: INTERNAL MEDICINE

## 2024-05-04 PROCEDURE — 72148 MRI LUMBAR SPINE W/O DYE: CPT | Performed by: EMERGENCY MEDICINE

## 2024-05-04 RX ORDER — ENEMA 19; 7 G/133ML; G/133ML
1 ENEMA RECTAL ONCE AS NEEDED
Status: DISCONTINUED | OUTPATIENT
Start: 2024-05-04 | End: 2024-05-08

## 2024-05-04 RX ORDER — BISACODYL 10 MG
10 SUPPOSITORY, RECTAL RECTAL
Status: DISCONTINUED | OUTPATIENT
Start: 2024-05-04 | End: 2024-05-08

## 2024-05-04 RX ORDER — PROCHLORPERAZINE EDISYLATE 5 MG/ML
5 INJECTION INTRAMUSCULAR; INTRAVENOUS EVERY 8 HOURS PRN
Status: DISCONTINUED | OUTPATIENT
Start: 2024-05-04 | End: 2024-05-08

## 2024-05-04 RX ORDER — MORPHINE SULFATE 4 MG/ML
4 INJECTION, SOLUTION INTRAMUSCULAR; INTRAVENOUS EVERY 2 HOUR PRN
Status: DISCONTINUED | OUTPATIENT
Start: 2024-05-04 | End: 2024-05-08

## 2024-05-04 RX ORDER — POLYETHYLENE GLYCOL 3350 17 G/17G
17 POWDER, FOR SOLUTION ORAL DAILY PRN
Status: DISCONTINUED | OUTPATIENT
Start: 2024-05-04 | End: 2024-05-08

## 2024-05-04 RX ORDER — ONDANSETRON 2 MG/ML
4 INJECTION INTRAMUSCULAR; INTRAVENOUS EVERY 6 HOURS PRN
Status: DISCONTINUED | OUTPATIENT
Start: 2024-05-04 | End: 2024-05-08

## 2024-05-04 RX ORDER — ACETAMINOPHEN 325 MG/1
650 TABLET ORAL EVERY 4 HOURS PRN
Status: DISCONTINUED | OUTPATIENT
Start: 2024-05-04 | End: 2024-05-05

## 2024-05-04 RX ORDER — MORPHINE SULFATE 2 MG/ML
1 INJECTION, SOLUTION INTRAMUSCULAR; INTRAVENOUS EVERY 2 HOUR PRN
Status: DISCONTINUED | OUTPATIENT
Start: 2024-05-04 | End: 2024-05-08

## 2024-05-04 RX ORDER — BUPROPION HYDROCHLORIDE 300 MG/1
300 TABLET ORAL
Status: DISCONTINUED | OUTPATIENT
Start: 2024-05-05 | End: 2024-05-08

## 2024-05-04 RX ORDER — MORPHINE SULFATE 2 MG/ML
2 INJECTION, SOLUTION INTRAMUSCULAR; INTRAVENOUS EVERY 2 HOUR PRN
Status: DISCONTINUED | OUTPATIENT
Start: 2024-05-04 | End: 2024-05-08

## 2024-05-04 RX ORDER — ACETAMINOPHEN 500 MG
500 TABLET ORAL EVERY 4 HOURS PRN
Status: DISCONTINUED | OUTPATIENT
Start: 2024-05-04 | End: 2024-05-08

## 2024-05-04 RX ORDER — SODIUM CHLORIDE 9 MG/ML
INJECTION, SOLUTION INTRAVENOUS CONTINUOUS
Status: DISCONTINUED | OUTPATIENT
Start: 2024-05-04 | End: 2024-05-08

## 2024-05-04 RX ORDER — DIAZEPAM 5 MG/1
5 TABLET ORAL ONCE
Status: COMPLETED | OUTPATIENT
Start: 2024-05-04 | End: 2024-05-04

## 2024-05-04 RX ORDER — GABAPENTIN 600 MG/1
600 TABLET ORAL 3 TIMES DAILY
Status: DISCONTINUED | OUTPATIENT
Start: 2024-05-04 | End: 2024-05-08

## 2024-05-04 RX ORDER — HYDROCODONE BITARTRATE AND ACETAMINOPHEN 5; 325 MG/1; MG/1
1 TABLET ORAL EVERY 4 HOURS PRN
Status: DISCONTINUED | OUTPATIENT
Start: 2024-05-04 | End: 2024-05-05

## 2024-05-04 RX ORDER — HEPARIN SODIUM 5000 [USP'U]/ML
5000 INJECTION, SOLUTION INTRAVENOUS; SUBCUTANEOUS EVERY 8 HOURS SCHEDULED
Status: DISCONTINUED | OUTPATIENT
Start: 2024-05-04 | End: 2024-05-06

## 2024-05-04 RX ORDER — HYDROCODONE BITARTRATE AND ACETAMINOPHEN 5; 325 MG/1; MG/1
2 TABLET ORAL EVERY 4 HOURS PRN
Status: DISCONTINUED | OUTPATIENT
Start: 2024-05-04 | End: 2024-05-05

## 2024-05-04 RX ORDER — SENNOSIDES 8.6 MG
17.2 TABLET ORAL NIGHTLY PRN
Status: DISCONTINUED | OUTPATIENT
Start: 2024-05-04 | End: 2024-05-08

## 2024-05-04 RX ORDER — MORPHINE SULFATE 4 MG/ML
4 INJECTION, SOLUTION INTRAMUSCULAR; INTRAVENOUS ONCE
Status: COMPLETED | OUTPATIENT
Start: 2024-05-04 | End: 2024-05-04

## 2024-05-04 RX ORDER — KETOROLAC TROMETHAMINE 30 MG/ML
30 INJECTION, SOLUTION INTRAMUSCULAR; INTRAVENOUS ONCE
Status: COMPLETED | OUTPATIENT
Start: 2024-05-04 | End: 2024-05-04

## 2024-05-04 NOTE — ED QUICK NOTES
Orders for admission, patient is aware of plan and ready to go upstairs. Any questions, please call ED RN Farnaz CANAS RN at extension [phone].     Patient Covid vaccination status: Unvaccinated     COVID Test Ordered in ED: None    COVID Suspicion at Admission: N/A    Running Infusions:      Mental Status/LOC at time of transport: A&Ox4    Other pertinent information:   CIWA score: N/A   NIH score:  N/A

## 2024-05-04 NOTE — H&P
[facility]  part of [facility]  HISTORY AND PHYSICAL       Byron Garcia Patient Status:  Emergency    1985  38 year old CSN [id number]   Location  Attending Manuel Tadeo MD     PCP RAJIV CALVILLO         DATE OF ADMISSION: 2024     CHIEF COMPLAINT: Low back pain    HISTORY OF PRESENT ILLNESS  This is a 38 year oldmale who presented complaining of worsening low back pain.  Patient states he has been battling with low back pain for some time.  He initially received some relief after previous epidural injection.  Patient stated lower back pain began progressively worsening over the past several days.  Patient was initially evaluated in the neurosurgery clinic and at that time was determined to be stable and sent home with further outpatient evaluation including MRI.  After returning home, patient symptoms began to worsen.  Patient stated having increasing pain, with radiation down the legs causing difficulties with ambulation prompting visit to ED for further evaluation.  Patient denied loss of bowel or bladder control.  At time of interview, patient reported some improvement of pain with medications in ED.  Patient otherwise denied chest pain, shortness of breath, abdominal pain, nausea vomiting, fevers or chills.    PAST MEDICAL HISTORY  Past Medical History:    Eye infection, right        PAST SURGICAL HISTORY  Past Surgical History:   Procedure Laterality Date    Hand/finger surgery unlisted      Lumbar spine surgery  2023    L4-5 LUMBAR LAMINECTOMY, RESECTION OF DISC       ALLERGIES   Patient has no known allergies.    MEDICATIONS  Patient's Medications   New Prescriptions    No medications on file   Previous Medications    BUPROPION  MG ORAL TABLET 24 HR    Take 1 tablet (300 mg total) by mouth before breakfast.    GABAPENTIN 600 MG ORAL TAB    Take 1 tablet (600 mg total) by mouth 3 (three) times daily.    HYDROCODONE-ACETAMINOPHEN 5-325 MG ORAL TAB     Take 1 tablet by mouth every 4 (four) hours as needed for Pain.   Modified Medications    No medications on file   Discontinued Medications    No medications on file       SOCIAL HISTORY  Social History     Socioeconomic History    Marital status:    Tobacco Use    Smoking status: Every Day     Current packs/day: 0.50     Types: Cigarettes    Smokeless tobacco: Never   Vaping Use    Vaping status: Never Used   Substance and Sexual Activity    Alcohol use: Yes    Drug use: Never       FAMILY HISTORY  Family History   Problem Relation Age of Onset    Cancer Father 62        Bladder       PHYSICAL EXAM  Vital signs:  BP (!) 131/91   Pulse 71   Temp 98.2 °F (36.8 °C) (Temporal)   Resp 20   SpO2 97%      GENERAL:  Awake and alert, in mild acute distress.  HEART:  Regular rhythm.  Regular rate   LUNGS:  Air entry was good.  No crackles or wheezes   ABDOMEN: Soft and non-tender.    MUSCULOSKELETAL: Decreased range of motion of lower extremities due to pain  EXTREMITIES: No edema appreciated  PSYCHIATRIC: Normal mood      IMAGING  MRI SPINE LUMBAR (CPT=72148)    Result Date: 5/4/2024  CONCLUSION:   Edema signal in the posterior half of the L4-5 intervertebral disc with small contiguous ventral epidural collection measuring 1.5 x 0.5 x 1.0 centimeter. The findings are suspicious for discitis with ventral epidural phlegmon or abscess.  The ventral epidural collection is stable from March 31 but there is increasing bone edema at L4 and 5. These changes are new from the Feb MR exams.   Stable complex T2 hyperintense fluid collection in the right paraspinal soft tissues extending down to the right laminectomy bed at L4-5 and extending along the right aspect of the thecal sac.  This may be sterile or infected    Dictated by (CST): Theo Guajardo MD on 5/04/2024 at 2:46 PM     Finalized by (CST): Theo Guajardo MD on 5/04/2024 at 3:20 PM           Data:  Recent Labs   Lab 05/04/24  1238   RBC 4.65   HGB 14.5   HCT 42.2    MCV 90.8   MCH 31.2   MCHC 34.4   RDW 13.0   NEPRELIM 4.64   WBC 6.8   .0     Recent Labs   Lab 05/04/24  1238   GLU 92   BUN 15   CREATSERUM 0.95   CA 9.7      K 4.2      CO2 30.0       ASSESSMENT/PLAN    Acute on chronic low back pain  -Acute worsening of pain over the past 48 hours or so  -Development of radicular pain down lower extremities and difficulties with ambulation  -History of previous epidural injections  -MRI lumbar spine reviewed.  Noted edema in the posterior half of L4-5.  Also noted small ventral epidural collection.  Findings suspicious for discitis with ventral epidural phlegmon or abscess.  -Neurosurgery, IR, ID were consulted in ED  -Possible IR drainage and or biopsy  -As no current signs of sepsis, holding antibiotics at this time until drainage and biopsy can be performed  -Pain control as able, close monitoring with IV narcotics  -PT/OT when cleared by neurosurgery    Elevated blood pressure reading  -Patient denies history of hypertension  -Likely related to pain  -Continue to monitor      Plan of care discussed with patient at bedside.  Discussed with ED physician and RN. Decision made that pt needs hospitalization for further management/monitoring.      Byron Mcfarland MD    This note was prepared using Dragon Medical voice recognition dictation software. As a result errors may occur. When identified these errors have been corrected. While every attempt is made to correct errors during dictation discrepancies may still exist

## 2024-05-04 NOTE — ED PROVIDER NOTES
Patient Seen in: [facility] Emergency Department      History     Chief Complaint   Patient presents with    Back Pain    Numbness Weakness     Stated Complaint: back pain, surgery in feb    Subjective:   HPI    38-year-old male with history of lumbar spine disease status post redo L4-5 microdiscectomy in February presents with complaints of markedly increased back pain along with numbness to his left lower extremity from his buttocks to the top of his foot.  He denies focal weakness.  He denies incontinence of bowel or bladder.  He denies anterior abdominal pain.  No fevers.  The patient was seen by the PA at his neurosurgeons office yesterday who recommended a repeat MRI of the lumbar spine and plans for flexion and extension films of the lumbar spine.  The patient reports that the pain significantly worsened after leaving the office prompting him to come to the ED today.    Objective:   Past Medical History:    Eye infection, right              Past Surgical History:   Procedure Laterality Date    Hand/finger surgery unlisted  2008    Lumbar spine surgery  11/08/2023    L4-5 LUMBAR LAMINECTOMY, RESECTION OF DISC                Social History     Socioeconomic History    Marital status:    Tobacco Use    Smoking status: Every Day     Current packs/day: 0.50     Types: Cigarettes    Smokeless tobacco: Never   Vaping Use    Vaping status: Never Used   Substance and Sexual Activity    Alcohol use: Yes    Drug use: Never     Social Determinants of Health     Financial Resource Strain: Low Risk  (4/20/2024)    Received from [facility]    Overall Financial Resource Strain (CARDIA)     Difficulty of Paying Living Expenses: Not hard at all   Food Insecurity: No Food Insecurity (4/20/2024)    Received from [facility]    Hunger Vital Sign     Worried About Running Out of Food in the Last Year: Never true     Ran Out of Food in the Last Year: Never true    Transportation Needs: No Transportation Needs (4/20/2024)    Received from [facility]    PRAPARE - Transportation     Lack of Transportation (Medical): No     Lack of Transportation (Non-Medical): No   Housing Stability: Low Risk  (4/20/2024)    Received from [facility]    Housing Stability Vital Sign     Unable to Pay for Housing in the Last Year: No     Number of Places Lived in the Last Year: 1     In the last 12 months, was there a time when you did not have a steady place to sleep or slept in a shelter (including now)?: No              Review of Systems    Positive for stated complaint: back pain, surgery in feb  Other systems are as noted in HPI.  Constitutional and vital signs reviewed.      All other systems reviewed and negative except as noted above.    Physical Exam     ED Triage Vitals [05/04/24 1146]   BP (!) 147/102   Pulse 95   Resp 20   Temp 98.2 °F (36.8 °C)   Temp src Temporal   SpO2 98 %   O2 Device None (Room air)       Current:BP (!) 135/94   Pulse 84   Temp 98.2 °F (36.8 °C) (Temporal)   Resp 20   SpO2 95%         Physical Exam      General Appearance: alert, uncomfortable appearing  Eyes: pupils equal and round no pallor or injection  ENT, Mouth: mucous membranes moist  Respiratory: there are no retractions, lungs are clear to auscultation  Cardiovascular: regular rate and rhythm  Gastrointestinal:  abdomen is soft and non tender, no masses, bowel sounds normal  Neurological: Speech normal.  Motor and sensation is intact and symmetric to bilateral lower extremities.  Skin: warm and dry, no rashes.  Musculoskeletal: neck is supple non tender        Extremities are symmetrical, full range of motion  Back: There is tenderness to palpation to the midline of the low lumbar spine.  No overlying erythema or skin changes noted.  The tenderness is below the surgical wound from previous surgery.  Psychiatric: patient is oriented X 3, there is no  agitation    DIFFERENTIAL DIAGNOSIS: After history and physical exam differential diagnosis was considered for back pain including but not limited to muscular pain, herniated disc, spine fracture, intra-abdominal causes and urinary tract infection        ED Course     Labs Reviewed   BASIC METABOLIC PANEL (8) - Normal   CBC WITH DIFFERENTIAL WITH PLATELET    Narrative:     The following orders were created for panel order CBC With Differential With Platelet.  Procedure                               Abnormality         Status                     ---------                               -----------         ------                     CBC W/ DIFFERENTIAL[[id number]]                              Final result                 Please view results for these tests on the individual orders.   URINALYSIS WITH CULTURE REFLEX   BLOOD CULTURE   BLOOD CULTURE   CBC W/ DIFFERENTIAL                    MDM      Lab and MRI results noted.  Discussed with Dr. Morrow, neurosurgery on-call.  Will obtain blood cultures and send a urinalysis.  Plan to admit for further evaluation and treatment.  Also discussed with Dr. Mcfarland, hospitalist.  Also discussed with Dr. Callaway, interventional radiology.  Also communicated with Dr. Cardoza, infectious disease.  Admission disposition: 5/4/2024  4:07 PM                                        Medical Decision Making      Disposition and Plan     Clinical Impression:  1. Discitis of lumbar region         Disposition:  Admit  5/4/2024  4:07 pm    Follow-up:  No follow-up provider specified.        Medications Prescribed:  Current Discharge Medication List                            Hospital Problems       Present on Admission  Date Reviewed: 5/3/2024            ICD-10-CM Noted POA    * (Principal) Discitis of lumbar region M46.46 5/4/2024 Unknown

## 2024-05-04 NOTE — ED INITIAL ASSESSMENT (HPI)
Patient ambulatory to triage, c/o lower back pain with radiation to left leg. Progressively getting worse, now having numbness and tingling to left leg. Was seen by provider yesterday. Advised to come to ER if having numbness/tingling. Has been going on for 24 hours now. Denies any loss of bowel/urine. Had discectomy and laminectomy in February of L4 and L5

## 2024-05-05 LAB
ALBUMIN SERPL-MCNC: 3.8 G/DL (ref 3.2–4.8)
ALBUMIN/GLOB SERPL: 1.7 {RATIO} (ref 1–2)
ALP LIVER SERPL-CCNC: 66 U/L
ALT SERPL-CCNC: 14 U/L
ANION GAP SERPL CALC-SCNC: 7 MMOL/L (ref 0–18)
AST SERPL-CCNC: 14 U/L (ref ?–34)
BASOPHILS # BLD AUTO: 0.03 X10(3) UL (ref 0–0.2)
BASOPHILS NFR BLD AUTO: 0.6 %
BILIRUB SERPL-MCNC: 0.3 MG/DL (ref 0.3–1.2)
BUN BLD-MCNC: 12 MG/DL (ref 9–23)
BUN/CREAT SERPL: 13.8 (ref 10–20)
CALCIUM BLD-MCNC: 9.2 MG/DL (ref 8.7–10.4)
CHLORIDE SERPL-SCNC: 107 MMOL/L (ref 98–112)
CO2 SERPL-SCNC: 27 MMOL/L (ref 21–32)
CREAT BLD-MCNC: 0.87 MG/DL
CRP SERPL-MCNC: 1.4 MG/DL (ref ?–1)
DEPRECATED RDW RBC AUTO: 42.7 FL (ref 35.1–46.3)
EGFRCR SERPLBLD CKD-EPI 2021: 112 ML/MIN/1.73M2 (ref 60–?)
EOSINOPHIL # BLD AUTO: 0.12 X10(3) UL (ref 0–0.7)
EOSINOPHIL NFR BLD AUTO: 2.3 %
ERYTHROCYTE [DISTWIDTH] IN BLOOD BY AUTOMATED COUNT: 12.9 % (ref 11–15)
ERYTHROCYTE [SEDIMENTATION RATE] IN BLOOD: 46 MM/HR
GLOBULIN PLAS-MCNC: 2.3 G/DL (ref 2–3.5)
GLUCOSE BLD-MCNC: 117 MG/DL (ref 70–99)
HCT VFR BLD AUTO: 37.8 %
HGB BLD-MCNC: 13.2 G/DL
IMM GRANULOCYTES # BLD AUTO: 0.02 X10(3) UL (ref 0–1)
IMM GRANULOCYTES NFR BLD: 0.4 %
INR BLD: 1 (ref 0.8–1.2)
LYMPHOCYTES # BLD AUTO: 1.63 X10(3) UL (ref 1–4)
LYMPHOCYTES NFR BLD AUTO: 30.6 %
MAGNESIUM SERPL-MCNC: 1.8 MG/DL (ref 1.6–2.6)
MCH RBC QN AUTO: 31.9 PG (ref 26–34)
MCHC RBC AUTO-ENTMCNC: 34.9 G/DL (ref 31–37)
MCV RBC AUTO: 91.3 FL
MONOCYTES # BLD AUTO: 0.51 X10(3) UL (ref 0.1–1)
MONOCYTES NFR BLD AUTO: 9.6 %
NEUTROPHILS # BLD AUTO: 3.02 X10 (3) UL (ref 1.5–7.7)
NEUTROPHILS # BLD AUTO: 3.02 X10(3) UL (ref 1.5–7.7)
NEUTROPHILS NFR BLD AUTO: 56.5 %
OSMOLALITY SERPL CALC.SUM OF ELEC: 293 MOSM/KG (ref 275–295)
PLATELET # BLD AUTO: 181 10(3)UL (ref 150–450)
POTASSIUM SERPL-SCNC: 3.9 MMOL/L (ref 3.5–5.1)
PROT SERPL-MCNC: 6.1 G/DL (ref 5.7–8.2)
PROTHROMBIN TIME: 13.8 SECONDS (ref 11.6–14.8)
RBC # BLD AUTO: 4.14 X10(6)UL
SODIUM SERPL-SCNC: 141 MMOL/L (ref 136–145)
WBC # BLD AUTO: 5.3 X10(3) UL (ref 4–11)

## 2024-05-05 PROCEDURE — 99233 SBSQ HOSP IP/OBS HIGH 50: CPT | Performed by: INTERNAL MEDICINE

## 2024-05-05 PROCEDURE — 99253 IP/OBS CNSLTJ NEW/EST LOW 45: CPT | Performed by: NEUROLOGICAL SURGERY

## 2024-05-05 RX ORDER — VANCOMYCIN HYDROCHLORIDE
15 EVERY 12 HOURS
Status: DISCONTINUED | OUTPATIENT
Start: 2024-05-05 | End: 2024-05-08

## 2024-05-05 RX ORDER — MAGNESIUM SULFATE HEPTAHYDRATE 40 MG/ML
2 INJECTION, SOLUTION INTRAVENOUS ONCE
Status: COMPLETED | OUTPATIENT
Start: 2024-05-05 | End: 2024-05-05

## 2024-05-05 RX ORDER — DIAZEPAM 5 MG/1
10 TABLET ORAL EVERY 8 HOURS PRN
Status: DISCONTINUED | OUTPATIENT
Start: 2024-05-05 | End: 2024-05-08

## 2024-05-05 RX ORDER — HYDROCODONE BITARTRATE AND ACETAMINOPHEN 10; 325 MG/1; MG/1
2 TABLET ORAL EVERY 4 HOURS PRN
Status: DISCONTINUED | OUTPATIENT
Start: 2024-05-05 | End: 2024-05-06

## 2024-05-05 NOTE — OCCUPATIONAL THERAPY NOTE
Received OT orders from hospitalist for eval and and treat when cleared by neurosurgery. Await nuerosurg consultation. Will follow.

## 2024-05-05 NOTE — CONSULTS
[facility]  part of [facility] ID CONSULT NOTE    Byron Garcia Patient Status:  Inpatient    1985 MRN [id number]   Location [facility] 4W/SW/SE Attending Byron Mcfarland MD   Hosp Day # 1 PCP RAJIV CALVILLO       Reason for Consultation:  Discitis, possible epidural abscess    ASSESSMENT:    Antibiotics: vancomycin -, cefepime -    # H/o chronic back pain, s/p 23 L4/5 laminectomy.  S/p 24 L4/5 microdiscectomy.  S/p lumbar injection on 24.   # Acute worsening lumbar pain with MRI findings of discitis and possible small epidural abscess    PLAN:    - IR aspiration planned   - given possible epidural abscess will start vancomycin, cefepime prior to aspiration  - check ESR, CRP  - f/u BC  - monitor for improvement  - Follow fever curve, wbc  - Reviewed labs, micro, imaging reports, available old records  - d/w patient, wife  - d/w staff  - d/w Dr Mcfarland  - will follow    History of Present Illness:  Byron Garcia is a a(n) 38 year old male with chronic back pain, s/p 23 L4/5 laminectomy.  Was doing well postop for few months and pain worsened.  S/p 24 L4/5 microdiscectomy.  Continued pain, had lumbar injection on 24.  Now presents with progressive worsening pain over past few days causing difficulty ambulating due to pain.  No fever, no night sweats or chills.  MRI showed Edema signal in the posterior half of the L4-5 intervertebral disc with small contiguous ventral epidural collection measuring 1.5 x 0.5 x 1.0 centimeter. The findings are suspicious for discitis with ventral epidural phlegmon or abscess.   IR planning aspiration on .      History:  Past Medical History:    Eye infection, right     Past Surgical History:   Procedure Laterality Date    Hand/finger surgery unlisted      Lumbar spine surgery  2023    L4-5 LUMBAR LAMINECTOMY, RESECTION OF DISC     Family History   Problem Relation Age of Onset     Cancer Father 62        Bladder      reports that he has been smoking cigarettes. He has never used smokeless tobacco. He reports current alcohol use. He reports that he does not use drugs.    Allergies:  No Known Allergies    Medications:    Current Facility-Administered Medications:     HYDROcodone-acetaminophen (Norco)  MG per tab 2 tablet, 2 tablet, Oral, Q4H PRN    diazePAM (Valium) tab 10 mg, 10 mg, Oral, Q8H PRN    ceFEPIme (Maxipime) 1 g in sodium chloride 0.9% 100 mL IVPB-MBP, 1 g, Intravenous, Q8H    buPROPion ER (Wellbutrin XL) 24 hr tab 300 mg, 300 mg, Oral, Before breakfast    gabapentin (Neurontin) tab 600 mg, 600 mg, Oral, TID    sodium chloride 0.9% infusion, , Intravenous, Continuous    heparin (Porcine) 5000 UNIT/ML injection 5,000 Units, 5,000 Units, Subcutaneous, Q8H AIDAN    acetaminophen (Tylenol Extra Strength) tab 500 mg, 500 mg, Oral, Q4H PRN    morphINE PF 2 MG/ML injection 1 mg, 1 mg, Intravenous, Q2H PRN **OR** morphINE PF 2 MG/ML injection 2 mg, 2 mg, Intravenous, Q2H PRN **OR** morphINE PF 4 MG/ML injection 4 mg, 4 mg, Intravenous, Q2H PRN    ondansetron (Zofran) 4 MG/2ML injection 4 mg, 4 mg, Intravenous, Q6H PRN    prochlorperazine (Compazine) 10 MG/2ML injection 5 mg, 5 mg, Intravenous, Q8H PRN    polyethylene glycol (PEG 3350) (Miralax) 17 g oral packet 17 g, 17 g, Oral, Daily PRN    sennosides (Senokot) tab 17.2 mg, 17.2 mg, Oral, Nightly PRN    bisacodyl (Dulcolax) 10 MG rectal suppository 10 mg, 10 mg, Rectal, Daily PRN    fleet enema (Fleet) 7-19 GM/118ML rectal enema 133 mL, 1 enema, Rectal, Once PRN    Review of Systems:  CONSTITUTIONAL:  No weight loss, weakness or fatigue.  HEENT:  Eyes:  No visual loss, blurred vision, double vision or yellow sclerae. Ears, Nose, Throat:  No hearing loss, sneezing, congestion, runny nose or sore throat.  SKIN:  No rash or itching.  CARDIOVASCULAR:  No chest pain, chest pressure or chest discomfort  RESPIRATORY:  No shortness of  breath, cough or sputum.  GASTROINTESTINAL:  No anorexia, nausea, vomiting or diarrhea. No abdominal pain or blood.  GENITOURINARY:  No Burning on urination.   NEUROLOGICAL:  No headache, dizziness, syncope, paralysis, ataxia, numbness or tingling in the extremities.  MUSCULOSKELETAL:  No muscle, back pain, joint pain or stiffness.    Physical Exam:  Vital signs: Blood pressure 129/89, pulse 72, temperature 97.8 °F (36.6 °C), temperature source Oral, resp. rate 16, weight 172 lb 11.2 oz (78.3 kg), SpO2 95%.    General: Alert, oriented, NAD  HEENT: Moist mucous membranes. EOMI  Neck: No lymphadenopathy.  Supple.  Cardiovascular: No chest wall tenderness  Respiratory: Symmetric expansion  Abdomen: Soft, nontender, nondistended.   Musculoskeletal: No edema noted  Integument: No lesions. No erythema.    Laboratory Data: Reviewed in EMR    Microbiology: Reviewed in EMR    Radiology: Reviewed    Thank you for allowing us to participate in the care of this patient. Please do not hesitate to call if you have any questions.     We will continue to follow with you and will make further recommendations based on his progress.    DO Kamryn [facility]  [phone]  5/5/2024

## 2024-05-05 NOTE — PLAN OF CARE
Pt is A&O X4. Breathing on room air. Voiding freely. Up with one assist and walker. Given Norco and Morphine prn for pain. NPO since midnight for possible IR drainage or biopsy today. Call light within reach. Safety precaution in place.    Problem: Patient Centered Care  Goal: Patient preferences are identified and integrated in the patient's plan of care  Description: Interventions:  - What would you like us to know as we care for you?   - Provide timely, complete, and accurate information to patient/family  - Incorporate patient and family knowledge, values, beliefs, and cultural backgrounds into the planning and delivery of care  - Encourage patient/family to participate in care and decision-making at the level they choose  - Honor patient and family perspectives and choices  Outcome: Progressing     Problem: Patient/Family Goals  Goal: Patient/Family Long Term Goal  Description: Patient's Long Term Goal: Able to perform daily living activities without pain    Interventions:  - Pain management  - Possible IR drainage or biopsy  - PT/OT  - See additional Care Plan goals for specific interventions  Outcome: Progressing  Goal: Patient/Family Short Term Goal  Description: Patient's Short Term Goal: Return home    Interventions:   - Follows plan of care  - Monitor labs  - Pain controlled  - See additional Care Plan goals for specific interventions  Outcome: Progressing     Problem: PAIN - ADULT  Goal: Verbalizes/displays adequate comfort level or patient's stated pain goal  Description: INTERVENTIONS:  - Encourage pt to monitor pain and request assistance  - Assess pain using appropriate pain scale  - Administer analgesics based on type and severity of pain and evaluate response  - Implement non-pharmacological measures as appropriate and evaluate response  - Consider cultural and social influences on pain and pain management  - Manage/alleviate anxiety  - Utilize distraction and/or relaxation techniques  - Monitor  for opioid side effects  - Notify MD/LIP if interventions unsuccessful or patient reports new pain  - Anticipate increased pain with activity and pre-medicate as appropriate  Outcome: Progressing     Problem: RISK FOR INFECTION - ADULT  Goal: Absence of fever/infection during anticipated neutropenic period  Description: INTERVENTIONS  - Monitor WBC  - Administer growth factors as ordered  - Implement neutropenic guidelines  Outcome: Progressing     Problem: SAFETY ADULT - FALL  Goal: Free from fall injury  Description: INTERVENTIONS:  - Assess pt frequently for physical needs  - Identify cognitive and physical deficits and behaviors that affect risk of falls.  - [place] fall precautions as indicated by assessment.  - Educate pt/family on patient safety including physical limitations  - Instruct pt to call for assistance with activity based on assessment  - Modify environment to reduce risk of injury  - Provide assistive devices as appropriate  - Consider OT/PT consult to assist with strengthening/mobility  - Encourage toileting schedule  Outcome: Progressing     Problem: DISCHARGE PLANNING  Goal: Discharge to home or other facility with appropriate resources  Description: INTERVENTIONS:  - Identify barriers to discharge w/pt and caregiver  - Include patient/family/discharge partner in discharge planning  - Arrange for needed discharge resources and transportation as appropriate  - Identify discharge learning needs (meds, wound care, etc)  - Arrange for interpreters to assist at discharge as needed  - Consider post-discharge preferences of patient/family/discharge partner  - Complete POLST form as appropriate  - Assess patient's ability to be responsible for managing their own health  - Refer to Case Management Department for coordinating discharge planning if the patient needs post-hospital services based on physician/LIP order or complex needs related to functional status, cognitive ability or social support  system  Outcome: Progressing

## 2024-05-05 NOTE — SPIRITUAL CARE NOTE
Spiritual Care Visit Note    Patient Name: Byron Garcia Date of Spiritual Care Visit: 24   : 1985 Primary Dx: Discitis of lumbar region       Referred By: Referral From: Nurse, Advanced Directives    Spiritual Care Taxonomy:    Intended Effects: Aligning care plan with patient's values              Visit Type/Summary:     - PoA: Patient unable to complete PoAH at this time:  remains available for follow up.    Spiritual Care support can be requested via an Epic consult.     For urgent/immediate needs, please contact the On Call  at: [place]: ext [phone]    Chaplain Andreas.

## 2024-05-05 NOTE — PROGRESS NOTES
[facility]  part of [facility]ist Progress Note     Byron Garcia Patient Status:  Inpatient    1985 MRN [id number]   Location [facility] 4W/SW/SE Attending Byron Mcfarland MD   Hosp Day # 1 PCP RAJIV CALVILLO     Chief Complaint:   Chief Complaint   Patient presents with    Back Pain    Numbness Weakness        Subjective:     Patient seen lying in bed.  Reports still having significant levels of pain in the back, worse with movement.  Still having radiation of pain down leg with some numbness and tingling.  Some improvement with pain medications.    Objective:      Vital signs:  Vitals:    24 1735 245 24 0449 24 0817   BP: 128/77 126/74 132/80 129/89   BP Location: Right arm Right arm Right arm Right arm   Pulse: 85 81 70 72   Resp: 18 18 17 16   Temp: 97.9 °F (36.6 °C) 97.9 °F (36.6 °C) 97.6 °F (36.4 °C) 97.8 °F (36.6 °C)   TempSrc: Oral Oral Oral Oral   SpO2: 97% 96% 96% 95%       Intake/Output Summary (Last 24 hours) at 2024 1037  Last data filed at 2024 1728  Gross per 24 hour   Intake 1000 ml   Output --   Net 1000 ml           Physical Exam:    GENERAL:  Awake and alert, in no acute distress.  HEART:  Regular rhythm.  Regular rate   LUNGS:  Air entry was good.  No crackles or wheezes   ABDOMEN: Soft and non-tender.    MUSCULOSKELETAL: Decreased range of motion of lower extremities due to pain  EXTREMITIES: No edema appreciated  PSYCHIATRIC: Normal mood    Diagnostic Data:    Labs:    Recent Labs   Lab 24  1238 24   WBC 6.8 5.3   HGB 14.5 13.2   MCV 90.8 91.3   .0 181.0   INR  --  1.00       Recent Labs   Lab 24  1238 24  05   GLU 92 117*   BUN 15 12   CREATSERUM 0.95 0.87   CA 9.7 9.2   ALB  --  3.8    141   K 4.2 3.9    107   CO2 30.0 27.0   ALKPHO  --  66   AST  --  14   ALT  --  14   BILT  --  0.3   TP  --  6.1           CrCl cannot be calculated (Unknown ideal  weight.).    Recent Labs   Lab 05/05/24  0524   PTP 13.8   INR 1.00            COVID-19  No results found for: \"COVID19\"    Pro-Calcitonin  No results for input(s): \"PCT\" in the last 168 hours.    Cardiac  No results for input(s): \"TROP\", \"PBNP\" in the last 168 hours.    Inflammatory Markers  No results for input(s): \"CRP\", \"KRYSTYNA\", \"LDH\", \"DDIMER\" in the last 168 hours.    Culture:  No results found for this visit on 05/04/24.    MRI SPINE LUMBAR (CPT=72148)    Result Date: 5/4/2024  CONCLUSION:   Edema signal in the posterior half of the L4-5 intervertebral disc with small contiguous ventral epidural collection measuring 1.5 x 0.5 x 1.0 centimeter. The findings are suspicious for discitis with ventral epidural phlegmon or abscess.  The ventral epidural collection is stable from March 31 but there is increasing bone edema at L4 and 5. These changes are new from the Feb MR exams.   Stable complex T2 hyperintense fluid collection in the right paraspinal soft tissues extending down to the right laminectomy bed at L4-5 and extending along the right aspect of the thecal sac.  This may be sterile or infected    Dictated by (CST): Theo Guajardo MD on 5/04/2024 at 2:46 PM     Finalized by (CST): Theo Guajardo MD on 5/04/2024 at 3:20 PM                 Medications:    buPROPion ER  300 mg Oral Before breakfast    gabapentin  600 mg Oral TID    heparin  5,000 Units Subcutaneous Q8H [facility]       Assessment & Plan:       Acute on chronic low back pain  -Acute worsening of pain around 48 hours prior to admission  -Development of radicular pain down lower extremities and difficulties with ambulation  -History of previous epidural injections  -MRI lumbar spine reviewed.  Noted edema in the posterior half of L4-5.  Also noted small ventral epidural collection.  Findings suspicious for discitis with ventral epidural phlegmon or abscess.  -Neurosurgery, IR, ID were consulted   -Discussed with IR, plans for biopsy and drainage of fluid  collection on 5/6.  -Starting antibiotics per ID.  -Pain control as able, close monitoring with IV narcotics  -PT/OT when cleared by neurosurgery     Elevated blood pressure reading  -Resolving  -Patient denies history of hypertension  -Likely related to pain  -Continue to monitor        Plan of care discussed with patient at bedside and with Rn. Discussed management/test result(s) with ID and IR consultant    Quality:  DVT Prophylaxis: Heparin  CODE status: Full  Estimated date of discharge: TBD  Discharge is dependent on: clinical stability    Byron Mcfarland MD          This note was prepared using Dragon Medical voice recognition dictation software. As a result errors may occur. When identified these errors have been corrected. While every attempt is made to correct errors during dictation discrepancies may still exist

## 2024-05-05 NOTE — PHYSICAL THERAPY NOTE
PT Orders and pt chart reviewed. Per EMR, PT evaluation pending neurosurg consult and POC. Will continue to follow and evaluate when deemed appropriate. Thank you,    Niesha Arriaza, PT  5/5/2024

## 2024-05-05 NOTE — PLAN OF CARE
Patient alert and oriented x 4. Still with numbness/tingling to LLE. Morphine and Norco given for pain, as well as valium for muscle spasms. Patient states pain is worse with movement. Subcutaneous Heparin held per Dr Callaway for procedure tomorrow. Started on IV Cefepime and IV Vanco per ID. Tolerating diet, patient requesting Miralax for constipation. Plans for biopsy and drainage of fluid collection tomorrow 5/6. Dr Dinero to follow up with patient tomorrow.      Problem: Patient Centered Care  Goal: Patient preferences are identified and integrated in the patient's plan of care  Description: Interventions:  - What would you like us to know as we care for you? I had surgery in february  - Provide timely, complete, and accurate information to patient/family  - Incorporate patient and family knowledge, values, beliefs, and cultural backgrounds into the planning and delivery of care  - Encourage patient/family to participate in care and decision-making at the level they choose  - Honor patient and family perspectives and choices  Outcome: Progressing     Problem: Patient/Family Goals  Goal: Patient/Family Long Term Goal  Description: Patient's Long Term Goal: Less pain    Interventions:  - Pain medications as needed  - PT/OT  - Drainage/biopsy   - See additional Care Plan goals for specific interventions  Outcome: Progressing  Goal: Patient/Family Short Term Goal  Description: Patient's Short Term Goal: Pain management    Interventions:   - Oral/IV pain meds as needed  - Position change  - See additional Care Plan goals for specific interventions  Outcome: Progressing     Problem: PAIN - ADULT  Goal: Verbalizes/displays adequate comfort level or patient's stated pain goal  Description: INTERVENTIONS:  - Encourage pt to monitor pain and request assistance  - Assess pain using appropriate pain scale  - Administer analgesics based on type and severity of pain and evaluate response  - Implement non-pharmacological measures  as appropriate and evaluate response  - Consider cultural and social influences on pain and pain management  - Manage/alleviate anxiety  - Utilize distraction and/or relaxation techniques  - Monitor for opioid side effects  - Notify MD/LIP if interventions unsuccessful or patient reports new pain  - Anticipate increased pain with activity and pre-medicate as appropriate  Outcome: Progressing     Problem: RISK FOR INFECTION - ADULT  Goal: Absence of fever/infection during anticipated neutropenic period  Description: INTERVENTIONS  - Monitor WBC  - Administer growth factors as ordered  - Implement neutropenic guidelines  Outcome: Progressing     Problem: SAFETY ADULT - FALL  Goal: Free from fall injury  Description: INTERVENTIONS:  - Assess pt frequently for physical needs  - Identify cognitive and physical deficits and behaviors that affect risk of falls.  - [place] fall precautions as indicated by assessment.  - Educate pt/family on patient safety including physical limitations  - Instruct pt to call for assistance with activity based on assessment  - Modify environment to reduce risk of injury  - Provide assistive devices as appropriate  - Consider OT/PT consult to assist with strengthening/mobility  - Encourage toileting schedule  Outcome: Progressing     Problem: DISCHARGE PLANNING  Goal: Discharge to home or other facility with appropriate resources  Description: INTERVENTIONS:  - Identify barriers to discharge w/pt and caregiver  - Include patient/family/discharge partner in discharge planning  - Arrange for needed discharge resources and transportation as appropriate  - Identify discharge learning needs (meds, wound care, etc)  - Arrange for interpreters to assist at discharge as needed  - Consider post-discharge preferences of patient/family/discharge partner  - Complete POLST form as appropriate  - Assess patient's ability to be responsible for managing their own health  - Refer to Case Management  Department for coordinating discharge planning if the patient needs post-hospital services based on physician/LIP order or complex needs related to functional status, cognitive ability or social support system  Outcome: Progressing

## 2024-05-05 NOTE — PROGRESS NOTES
[facility] Pharmacy Dosing Service      Initial Pharmacokinetic Consult for Vancomycin Dosing     Byron Garcia is a 38 year old male who is being initiated on vancomycin therapy for osteomyelitis.  Pharmacy has been asked to dose vancomycin by Dr Cardoza .  The initial treatment and monitoring approach will be steady state AUC strategy.        Weight and Temperature:    Wt Readings from Last 1 Encounters:   24 78.3 kg (172 lb 11.2 oz)        Temp Readings from Last 1 Encounters:   24 97.8 °F (36.6 °C) (Oral)      Labs:   Recent Labs   Lab 24  1238 24   CREATSERUM 0.95 0.87      Estimated Creatinine Clearance: 105.5 mL/min (based on SCr of 0.87 mg/dL).     Recent Labs   Lab 24  1238 24   WBC 6.8 5.3          The Pharmacokinetic Target is:     to 600 mg-h/L and trough <=15 mg/L    Renal Dosing Considerations:    None     Assessment/Plan:   Initial/Loading dose: Will receive 1250 mg IV (15 mg/kg, capped at 2250 mg) x 1 initial dose.      Maintenance dose: Pharmacy will dose vancomycin at 1250 mg IV every 12 hours    Monitorin) Plan for vancomycin peak and trough to be obtained at steady state    2) Pharmacy will order SCr as clinically indicated to assess renal function.    3) Pharmacy will monitor for toxicity and efficacy, adjust vancomycin dose and/or frequency, and order vancomycin levels as appropriate per the Pharmacy and Therapeutics Committee approved protocol until discontinuation of the medication.       We appreciate the opportunity to assist in the care of this patient.     Rula Baker, KennD  2024  12:55 PM  Health system Pharmacy Extension: [phone]

## 2024-05-05 NOTE — CONSULTS
[facility]  part of [facility]    Neurosurgery Consult  2024    Byron Garcia Patient Status:  Inpatient    1985 MRN [id number]   Location [facility] 4W/SW/SE Attending Byron Mcfarland MD   Hosp Day # 1 PCP RAJIV CALVILLO     REASON FOR CONSULT:    LBP with LLE radiculopathy    HISTORY OF PRESENT ILLNESS:  Byron Garcia is a(n) 38 year old male who has had 2 prior laminectomies and was recently seen by Dr. Dinero's team for F/U.  He presents to the ED with worsening back pain and LLE radiculopathy.  He has no weakness nor bowel/bladder habit changes.      MRI shows post surgical changes with ventral scar tissue vs. Phlegmon and possible discitis vs. Modic changes     REVIEW OF SYSTEMS:  The 12 point checklist was reviewed and was negative except:none    ALLERGIES:  No Known Allergies    MEDICATIONS:  Medications Prior to Admission   Medication Sig Dispense Refill Last Dose    HYDROcodone-acetaminophen 5-325 MG Oral Tab Take 1 tablet by mouth every 4 (four) hours as needed for Pain. 30 tablet 0 2024    gabapentin 600 MG Oral Tab Take 1 tablet (600 mg total) by mouth 3 (three) times daily. 90 tablet 0 2024    buPROPion  MG Oral Tablet 24 Hr Take 1 tablet (300 mg total) by mouth before breakfast.   2024       HISTORY:  Past Medical History:    Eye infection, right     Past Surgical History:   Procedure Laterality Date    Hand/finger surgery unlisted      Lumbar spine surgery  2023    L4-5 LUMBAR LAMINECTOMY, RESECTION OF DISC     Family History   Problem Relation Age of Onset    Cancer Father 62        Bladder     Byron  reports that he has been smoking cigarettes. He has never used smokeless tobacco. He reports current alcohol use. He reports that he does not use drugs.    PHYSICAL EXAMINATION:  Vital Signs:  /89 (BP Location: Right arm)   Pulse 72   Temp 97.8 °F (36.6 °C) (Oral)   Resp 16   Wt 172 lb 11.2 oz (78.3 kg)   SpO2  95%   BMI 27.05 kg/m²     Neuro    Motor: BOURGEOIS with good strength    Sensory: intact to LT throughout     Incision: no drainage, mild edema, no erythema    REVIEW OF STUDIES:  MRI : as above      ASSESSMENT AND PLAN:  LBP and LLE radiculopathy  Appreciate ID recs  Biopsy of disc space tomorrow  Tailor meds  Norco 10 2 ts q 4  Valium 10 mg q 8  Will not start steroids in setting of possible infection  Will follow  Call with questions    Magalis Morrow DO    5/5/2024  12:36 PM

## 2024-05-06 ENCOUNTER — APPOINTMENT (OUTPATIENT)
Dept: CT IMAGING | Facility: HOSPITAL | Age: 39
DRG: 867 | End: 2024-05-06
Attending: RADIOLOGY
Payer: COMMERCIAL

## 2024-05-06 LAB
ANION GAP SERPL CALC-SCNC: 4 MMOL/L (ref 0–18)
BASOPHILS # BLD AUTO: 0.06 X10(3) UL (ref 0–0.2)
BASOPHILS NFR BLD AUTO: 0.9 %
BUN BLD-MCNC: 11 MG/DL (ref 9–23)
BUN/CREAT SERPL: 11.3 (ref 10–20)
CALCIUM BLD-MCNC: 9.5 MG/DL (ref 8.7–10.4)
CHLORIDE SERPL-SCNC: 107 MMOL/L (ref 98–112)
CO2 SERPL-SCNC: 29 MMOL/L (ref 21–32)
CREAT BLD-MCNC: 0.97 MG/DL
DEPRECATED RDW RBC AUTO: 44.1 FL (ref 35.1–46.3)
EGFRCR SERPLBLD CKD-EPI 2021: 101 ML/MIN/1.73M2 (ref 60–?)
EOSINOPHIL # BLD AUTO: 0.16 X10(3) UL (ref 0–0.7)
EOSINOPHIL NFR BLD AUTO: 2.5 %
ERYTHROCYTE [DISTWIDTH] IN BLOOD BY AUTOMATED COUNT: 12.5 % (ref 11–15)
GLUCOSE BLD-MCNC: 102 MG/DL (ref 70–99)
HCT VFR BLD AUTO: 41.1 %
HGB BLD-MCNC: 13.4 G/DL
IMM GRANULOCYTES # BLD AUTO: 0.07 X10(3) UL (ref 0–1)
IMM GRANULOCYTES NFR BLD: 1.1 %
LYMPHOCYTES # BLD AUTO: 1.79 X10(3) UL (ref 1–4)
LYMPHOCYTES NFR BLD AUTO: 28.3 %
MAGNESIUM SERPL-MCNC: 1.9 MG/DL (ref 1.6–2.6)
MCH RBC QN AUTO: 30.7 PG (ref 26–34)
MCHC RBC AUTO-ENTMCNC: 32.6 G/DL (ref 31–37)
MCV RBC AUTO: 94.1 FL
MONOCYTES # BLD AUTO: 0.54 X10(3) UL (ref 0.1–1)
MONOCYTES NFR BLD AUTO: 8.5 %
NEUTROPHILS # BLD AUTO: 3.71 X10 (3) UL (ref 1.5–7.7)
NEUTROPHILS # BLD AUTO: 3.71 X10(3) UL (ref 1.5–7.7)
NEUTROPHILS NFR BLD AUTO: 58.7 %
OSMOLALITY SERPL CALC.SUM OF ELEC: 290 MOSM/KG (ref 275–295)
PLATELET # BLD AUTO: 188 10(3)UL (ref 150–450)
POTASSIUM SERPL-SCNC: 4.2 MMOL/L (ref 3.5–5.1)
RBC # BLD AUTO: 4.37 X10(6)UL
SODIUM SERPL-SCNC: 140 MMOL/L (ref 136–145)
WBC # BLD AUTO: 6.3 X10(3) UL (ref 4–11)

## 2024-05-06 PROCEDURE — 009U3ZX DRAINAGE OF SPINAL CANAL, PERCUTANEOUS APPROACH, DIAGNOSTIC: ICD-10-PCS | Performed by: RADIOLOGY

## 2024-05-06 PROCEDURE — 99024 POSTOP FOLLOW-UP VISIT: CPT | Performed by: NEUROLOGICAL SURGERY

## 2024-05-06 PROCEDURE — 77012 CT SCAN FOR NEEDLE BIOPSY: CPT | Performed by: RADIOLOGY

## 2024-05-06 PROCEDURE — 10160 PNXR ASPIR ABSC HMTMA BULLA: CPT | Performed by: RADIOLOGY

## 2024-05-06 PROCEDURE — 99233 SBSQ HOSP IP/OBS HIGH 50: CPT | Performed by: INTERNAL MEDICINE

## 2024-05-06 RX ORDER — ONDANSETRON 2 MG/ML
4 INJECTION INTRAMUSCULAR; INTRAVENOUS ONCE
Status: COMPLETED | OUTPATIENT
Start: 2024-05-06 | End: 2024-05-06

## 2024-05-06 RX ORDER — MIDAZOLAM HYDROCHLORIDE 1 MG/ML
1 INJECTION INTRAMUSCULAR; INTRAVENOUS EVERY 5 MIN PRN
Status: DISCONTINUED | OUTPATIENT
Start: 2024-05-06 | End: 2024-05-06

## 2024-05-06 RX ORDER — FLUMAZENIL 0.1 MG/ML
0.2 INJECTION INTRAVENOUS AS NEEDED
Status: DISCONTINUED | OUTPATIENT
Start: 2024-05-06 | End: 2024-05-06

## 2024-05-06 RX ORDER — MIDAZOLAM HYDROCHLORIDE 1 MG/ML
INJECTION INTRAMUSCULAR; INTRAVENOUS
Status: COMPLETED
Start: 2024-05-06 | End: 2024-05-06

## 2024-05-06 RX ORDER — NALOXONE HYDROCHLORIDE 0.4 MG/ML
80 INJECTION, SOLUTION INTRAMUSCULAR; INTRAVENOUS; SUBCUTANEOUS AS NEEDED
Status: DISCONTINUED | OUTPATIENT
Start: 2024-05-06 | End: 2024-05-06

## 2024-05-06 RX ORDER — HYDROCODONE BITARTRATE AND ACETAMINOPHEN 10; 325 MG/1; MG/1
1 TABLET ORAL EVERY 4 HOURS PRN
Status: DISCONTINUED | OUTPATIENT
Start: 2024-05-06 | End: 2024-05-07

## 2024-05-06 RX ORDER — HYDROCODONE BITARTRATE AND ACETAMINOPHEN 10; 325 MG/1; MG/1
2 TABLET ORAL EVERY 4 HOURS PRN
Status: DISCONTINUED | OUTPATIENT
Start: 2024-05-06 | End: 2024-05-07

## 2024-05-06 RX ORDER — HEPARIN SODIUM 5000 [USP'U]/ML
5000 INJECTION, SOLUTION INTRAVENOUS; SUBCUTANEOUS EVERY 8 HOURS SCHEDULED
Status: DISCONTINUED | OUTPATIENT
Start: 2024-05-06 | End: 2024-05-08

## 2024-05-06 RX ORDER — ONDANSETRON 2 MG/ML
INJECTION INTRAMUSCULAR; INTRAVENOUS
Status: COMPLETED
Start: 2024-05-06 | End: 2024-05-06

## 2024-05-06 RX ORDER — SODIUM CHLORIDE 9 MG/ML
INJECTION, SOLUTION INTRAVENOUS CONTINUOUS
Status: DISCONTINUED | OUTPATIENT
Start: 2024-05-06 | End: 2024-05-06

## 2024-05-06 NOTE — PLAN OF CARE
Patient A&Ox4. Biopsy done today, awaiting results. Dressing in place to lower back. Monitoring vital signs. No acute changes noted throughout shift. Receiving IV abx per MD order. Tolerating diet. Voiding freely. SCDs for DVT prophylaxis. Pain medication provided as needed. Up with standby assist and a walker. Encouraged frequent ambulation and use of incentive spirometer. Fall precautions maintained- bed alarm on, bed locked in lowest position, call light and personal belongings within reach, non-skid socks in place to bilateral feet. Frequent rounding by nursing staff. Plan TBD.       Problem: Patient Centered Care  Goal: Patient preferences are identified and integrated in the patient's plan of care  Description: Interventions:  - What would you like us to know as we care for you?  I want to be able to get in the pool with my daughters  - Provide timely, complete, and accurate information to patient/family  - Incorporate patient and family knowledge, values, beliefs, and cultural backgrounds into the planning and delivery of care  - Encourage patient/family to participate in care and decision-making at the level they choose  - Honor patient and family perspectives and choices  Outcome: Progressing     Problem: Patient/Family Goals  Goal: Patient/Family Long Term Goal  Description: Patient's Long Term Goal: return to baseline ADLS  Interventions:  - pt/ot  -pain control  -mobolize  - See additional Care Plan goals for specific interventions  Outcome: Progressing  Goal: Patient/Family Short Term Goal  Description: Patient's Short Term Goal: home 1-2 days  Interventions:   - vs/labs wnl  -voiding  -tolerating diet  -pain well controlled  -pt/ot cleared   - See additional Care Plan goals for specific interventions  Outcome: Progressing     Problem: PAIN - ADULT  Goal: Verbalizes/displays adequate comfort level or patient's stated pain goal  Description: INTERVENTIONS:  - Encourage pt to monitor pain and request  assistance  - Assess pain using appropriate pain scale  - Administer analgesics based on type and severity of pain and evaluate response  - Implement non-pharmacological measures as appropriate and evaluate response  - Consider cultural and social influences on pain and pain management  - Manage/alleviate anxiety  - Utilize distraction and/or relaxation techniques  - Monitor for opioid side effects  - Notify MD/LIP if interventions unsuccessful or patient reports new pain  - Anticipate increased pain with activity and pre-medicate as appropriate  Outcome: Progressing     Problem: RISK FOR INFECTION - ADULT  Goal: Absence of fever/infection during anticipated neutropenic period  Description: INTERVENTIONS  - Monitor WBC  - Administer growth factors as ordered  - Implement neutropenic guidelines  Outcome: Progressing     Problem: SAFETY ADULT - FALL  Goal: Free from fall injury  Description: INTERVENTIONS:  - Assess pt frequently for physical needs  - Identify cognitive and physical deficits and behaviors that affect risk of falls.  - [place] fall precautions as indicated by assessment.  - Educate pt/family on patient safety including physical limitations  - Instruct pt to call for assistance with activity based on assessment  - Modify environment to reduce risk of injury  - Provide assistive devices as appropriate  - Consider OT/PT consult to assist with strengthening/mobility  - Encourage toileting schedule  Outcome: Progressing     Problem: DISCHARGE PLANNING  Goal: Discharge to home or other facility with appropriate resources  Description: INTERVENTIONS:  - Identify barriers to discharge w/pt and caregiver  - Include patient/family/discharge partner in discharge planning  - Arrange for needed discharge resources and transportation as appropriate  - Identify discharge learning needs (meds, wound care, etc)  - Arrange for interpreters to assist at discharge as needed  - Consider post-discharge preferences of  patient/family/discharge partner  - Complete POLST form as appropriate  - Assess patient's ability to be responsible for managing their own health  - Refer to Case Management Department for coordinating discharge planning if the patient needs post-hospital services based on physician/LIP order or complex needs related to functional status, cognitive ability or social support system  Outcome: Progressing     Problem: SKIN/TISSUE INTEGRITY - ADULT  Goal: Skin integrity remains intact  Description: INTERVENTIONS  - Assess and document risk factors for pressure ulcer development  - Assess and document skin integrity  - Monitor for areas of redness and/or skin breakdown  - Initiate interventions, skin care algorithm/standards of care as needed  Outcome: Progressing  Goal: Incision(s), wounds(s) or drain site(s) healing without S/S of infection  Description: INTERVENTIONS:  - Assess and document risk factors for pressure ulcer development  - Assess and document skin integrity  - Assess and document dressing/incision, wound bed, drain sites and surrounding tissue  - Implement wound care per orders  - Initiate isolation precautions as appropriate  - Initiate Pressure Ulcer prevention bundle as indicated  Outcome: Progressing     Problem: MUSCULOSKELETAL - ADULT  Goal: Return mobility to safest level of function  Description: INTERVENTIONS:  - Assess patient stability and activity tolerance for standing, transferring and ambulating w/ or w/o assistive devices  - Assist with transfers and ambulation using safe patient handling equipment as needed  - Ensure adequate protection for wounds/incisions during mobilization  - Obtain PT/OT consults as needed  - Advance activity as appropriate  - Communicate ordered activity level and limitations with patient/family  Outcome: Progressing  Goal: Maintain proper alignment of affected body part  Description: INTERVENTIONS:  - Support and protect limb and body alignment per provider's  orders  - Instruct and reinforce with patient and family use of appropriate assistive device and precautions (e.g. spinal or hip dislocation precautions)  Outcome: Progressing     Problem: Impaired Functional Mobility  Goal: Achieve highest/safest level of mobility/gait  Description: Interventions:  - Assess patient's functional ability and stability  - Promote increasing activity/tolerance for mobility and gait  - Educate and engage patient/family in tolerated activity level and precautions  Outcome: Progressing     Problem: Impaired Activities of Daily Living  Goal: Achieve highest/safest level of independence in self care  Description: Interventions:  - Assess ability and encourage patient to participate in ADLs to maximize function  - Promote sitting position while performing ADLs such as feeding, grooming, and bathing  - Educate and encourage patient/family in tolerated functional activity level and precautions during self-care  Outcome: Progressing

## 2024-05-06 NOTE — OCCUPATIONAL THERAPY NOTE
Physical & Occupational Therapy Contact Note    Orders received and chart reviewed. Attempted to see patient for Physical & Occupational Therapy services at 0910. Patient not available secondary to pt is currently off floor for procedure- biopsy and fluid drainage.     Will re-attempt pending patient availability/appropriateness as schedule allows, otherwise will re-schedule visit.

## 2024-05-06 NOTE — PAYOR COMM NOTE
--------------  ADMISSION REVIEW     Payor: BCMIKE SUÁREZ  Subscriber #:  [id number]  Authorization Number: [id number]    Admit date: 5/4/24  Admit time:  5:30 PM       REVIEW DOCUMENTATION:     ED Provider Notes        ED Provider Notes signed by Manuel Tadeo MD at 5/4/2024  4:58 PM       Author: Manuel Tadeo MD Service: -- Author Type: Physician    Filed: 5/4/2024  4:58 PM Date of Service: 5/4/2024  1:23 PM Status: Signed    : Manuel Tadeo MD (Physician)           Patient Seen in: [facility] Emergency Department      History     Chief Complaint   Patient presents with    Back Pain    Numbness Weakness     Stated Complaint: back pain, surgery in feb    Subjective:   HPI    38-year-old male with history of lumbar spine disease status post redo L4-5 microdiscectomy in February presents with complaints of markedly increased back pain along with numbness to his left lower extremity from his buttocks to the top of his foot.  He denies focal weakness.  He denies incontinence of bowel or bladder.  He denies anterior abdominal pain.  No fevers.  The patient was seen by the PA at his neurosurgeons office yesterday who recommended a repeat MRI of the lumbar spine and plans for flexion and extension films of the lumbar spine.  The patient reports that the pain significantly worsened after leaving the office prompting him to come to the ED today.    Objective:   Past Medical History:    Eye infection, right     Past Surgical History:   Procedure Laterality Date    Hand/finger surgery unlisted  2008    Lumbar spine surgery  11/08/2023    L4-5 LUMBAR LAMINECTOMY, RESECTION OF DISC       Physical Exam     ED Triage Vitals [05/04/24 1146]   BP (!) 147/102   Pulse 95   Resp 20   Temp 98.2 °F (36.8 °C)   Temp src Temporal   SpO2 98 %   O2 Device None (Room air)       Current:BP (!) 135/94   Pulse 84   Temp 98.2 °F (36.8 °C) (Temporal)   Resp 20   SpO2 95%     Physical Exam      General Appearance: alert,  uncomfortable appearing  Eyes: pupils equal and round no pallor or injection  ENT, Mouth: mucous membranes moist  Respiratory: there are no retractions, lungs are clear to auscultation  Cardiovascular: regular rate and rhythm  Gastrointestinal:  abdomen is soft and non tender, no masses, bowel sounds normal  Neurological: Speech normal.  Motor and sensation is intact and symmetric to bilateral lower extremities.  Skin: warm and dry, no rashes.  Musculoskeletal: neck is supple non tender        Extremities are symmetrical, full range of motion  Back: There is tenderness to palpation to the midline of the low lumbar spine.  No overlying erythema or skin changes noted.  The tenderness is below the surgical wound from previous surgery.  Psychiatric: patient is oriented X 3, there is no agitation    Disposition and Plan     Clinical Impression:  1. Discitis of lumbar region         Disposition:  Admit  5/4/2024  4:07 pm  Signed by Manuel Tadeo MD on 5/4/2024  4:58 PM         HISTORY AND PHYSICAL      MRI SPINE LUMBAR (CPT=72148)     Result Date: 5/4/2024  CONCLUSION:          Edema signal in the posterior half of the L4-5 intervertebral disc with small contiguous ventral epidural collection measuring 1.5 x 0.5 x 1.0 centimeter. The findings are suspicious for discitis with ventral epidural phlegmon or abscess.  The ventral epidural collection is stable from March 31 but there is increasing bone edema at L4 and 5. These changes are new from the Feb MR exams.   Stable complex T2 hyperintense fluid collection in the right paraspinal soft tissues extending down to the right laminectomy bed at L4-5 and extending along the right aspect of the thecal sac.  This may be sterile or infected       Lab 05/04/24  1238   RBC 4.65   HGB 14.5   HCT 42.2   MCV 90.8   MCH 31.2   MCHC 34.4   RDW 13.0   NEPRELIM 4.64   WBC 6.8   .0          Recent Labs   Lab 05/04/24  1238   GLU 92   BUN 15   CREATSERUM 0.95   CA 9.7      K  4.2      CO2 30.0         ASSESSMENT/PLAN     Acute on chronic low back pain  -Acute worsening of pain over the past 48 hours or so  -Development of radicular pain down lower extremities and difficulties with ambulation  -History of previous epidural injections  -MRI lumbar spine reviewed.  Noted edema in the posterior half of L4-5.  Also noted small ventral epidural collection.  Findings suspicious for discitis with ventral epidural phlegmon or abscess.  -Neurosurgery, IR, ID were consulted in ED  -Possible IR drainage and or biopsy  -As no current signs of sepsis, holding antibiotics at this time until drainage and biopsy can be performed  -Pain control as able, close monitoring with IV narcotics  -PT/OT when cleared by neurosurgery     Elevated blood pressure reading  -Patient denies history of hypertension  -Likely related to pain  -Continue to monitor      Neurosurgery Consult  5/5/2024    MRI shows post surgical changes with ventral scar tissue vs. Phlegmon and possible discitis vs. Modic changes     ASSESSMENT AND PLAN:  LBP and LLE radiculopathy  Appreciate ID recs  Biopsy of disc space tomorrow  Tailor meds  Norco 10 2 ts q 4  Valium 10 mg q 8  Will not start steroids in setting of possible infection  Will follow        ID CONSULT     Reason for Consultation:  Discitis, possible epidural abscess     ASSESSMENT:     Antibiotics: vancomycin 5/5-, cefepime 5/5-     # H/o chronic back pain, s/p 11/8/23 L4/5 laminectomy.  S/p 2/28/24 L4/5 microdiscectomy.  S/p lumbar injection on 4/1/24.   # Acute worsening lumbar pain with MRI findings of discitis and possible small epidural abscess     PLAN:     - IR aspiration planned 5/6  - given possible epidural abscess will start vancomycin, cefepime prior to aspiration  - check ESR, CRP  - f/u BC  - monitor for improvement  - Follow fever curve, wbc  - Reviewed labs, micro, imaging reports, available old records    Physical Exam:  Vital signs: Blood pressure  129/89, pulse 72, temperature 97.8 °F (36.6 °C), temperature source Oral, resp. rate 16, weight 172 lb 11.2 oz (78.3 kg), SpO2 95%.     General: Alert, oriented, NAD  HEENT: Moist mucous membranes. EOMI  Neck: No lymphadenopathy.  Supple.  Cardiovascular: No chest wall tenderness  Respiratory: Symmetric expansion  Abdomen: Soft, nontender, nondistended.   Musculoskeletal: No edema noted  Integument: No lesions. No erythema.        IR PROCEDURE  5-6-24     Procedure: CT guided aspiration of paraspinal fluid collection     Pre-Procedure Diagnosis:  post laminectomy complex paraspinal fluid collection     Post-Procedure Diagnosis: same     Anesthesia:  Sedation     Findings:  5 ml turbid yellow fluid aspirated             MEDICATIONS ADMINISTERED IN LAST 1 DAY:  buPROPion ER (Wellbutrin XL) 24 hr tab 300 mg       Date Action Dose Route User    5/6/2024 0519 Given 300 mg Oral Laury Mosquera RN    5/5/2024 1131 Given 300 mg Oral JohnsonAysha foley RN          ceFEPIme (Maxipime) 1 g in sodium chloride 0.9% 100 mL IVPB-MBP       Date Action Dose Route User    5/6/2024 0519 New Bag 1 g Intravenous Laury Mosquera RN    5/5/2024 2138 New Bag 1 g Intravenous Laury Mosquera RN    5/5/2024 1512 New Bag 1 g Intravenous Aysha Johnson RN          diazePAM (Valium) tab 10 mg       Date Action Dose Route User    5/6/2024 0927 Given 10 mg Oral Paolo Yoo RN    5/6/2024 0137 Given 10 mg Oral Laury Mosquera RN    5/5/2024 1520 Given 5 mg Oral JohnsonAysha foley RN          fentaNYL (Sublimaze) 50 mcg/mL injection 50 mcg       Date Action Dose Route User    5/6/2024 0825 Given 50 mcg Intravenous Benigno Morfin RN          fentaNYL (Sublimaze) 50 mcg/mL injection       Date Action Dose Route User    5/6/2024 0825 Given 50 mcg Intravenous Benigno Morfin RN          gabapentin (Neurontin) tab 600 mg       Date Action Dose Route User    5/6/2024 0926 Given 600 mg Oral Paolo Yoo RN    5/5/2024 2135 Given 600 mg Oral  Laury Mosquera RN    5/5/2024 1702 Given 600 mg Oral JohnsonAysha foley RN          HYDROcodone-acetaminophen (Norco)  MG per tab 2 tablet       Date Action Dose Route User    5/6/2024 0527 Given 1 tablet Oral Laury Mosquera RN    5/5/2024 2135 Given 2 tablet Oral Laury Mosquera RN    5/5/2024 1736 Given 1 tablet Oral Aysha Johnson RN    5/5/2024 1332 Given 1 tablet Oral Aysha Johnson RN          midazolam (Versed) 2 MG/2ML injection 1 mg       Date Action Dose Route User    5/6/2024 0835 Given 2 mg Intravenous Benigno Morfin RN          midazolam (Versed) 2 MG/2ML injection       Date Action Dose Route User    5/6/2024 0835 Given 2 mg Intravenous Benigno Morfin RN          morphINE PF 4 MG/ML injection 4 mg       Date Action Dose Route User    5/5/2024 1115 Given 4 mg Intravenous Aysha Johnson RN          ondansetron (Zofran) 4 MG/2ML injection 4 mg       Date Action Dose Route User    5/6/2024 0825 Given 4 mg Intravenous Benigno Morfin RN          ondansetron (Zofran) 4 MG/2ML injection       Date Action Dose Route User    5/6/2024 0825 Given 4 mg Intravenous Benigno Morfin RN          polyethylene glycol (PEG 3350) (Miralax) 17 g oral packet 17 g       Date Action Dose Route User    5/5/2024 1702 Given 17 g Oral JohnsonAysha rodgers RN          sodium chloride 0.9% infusion 75 ML HR       Date Action Dose Route User    5/6/2024 0820 New Bag (none) Intravenous Benigno Morfin RN          vancomycin (Vancocin) 1.25 g in sodium chloride 0.9% 250mL IVPB premix       Date Action Dose Route User    5/6/2024 0133 New Bag 1,250 mg Intravenous Laury Mosquera RN    5/5/2024 1334 New Bag 1,250 mg Intravenous Aysha Johnson RN

## 2024-05-06 NOTE — OCCUPATIONAL THERAPY NOTE
OCCUPATIONAL THERAPY EVALUATION - INPATIENT     Room Number: 402/402-A  Evaluation Date: 5/6/2024  Type of Evaluation: Initial  Presenting Problem: worsening back pain and LLE radiculopathy    Physician Order: IP Consult to Occupational Therapy  Reason for Therapy: ADL/IADL Dysfunction and Discharge Planning    OCCUPATIONAL THERAPY ASSESSMENT   Patient is a 38 year old male admitted 5/4/2024 for worsening back pain s/p biposy and fluid drainage on 5/6.   Prior to admission, patient's baseline is Mod I to I with ADLs and ambulation. Pt having recent difficult with donning socks and some LB dressing tasks  Patient is currently functioning below baseline with  self care/ADLs .  Patient is requiring minimal assist as a result of the following impairments: pain. Occupational Therapy will DC services. Pt had spinal surgery in Nov and Feb last year. He demo's and verbalizes good understanding of body mechanics and spine precautions. Pt does not have further OT needs. Will sign off.    PLAN     OT Device Recommendations: Reacher; Sock aid; Long-handled sponge    OCCUPATIONAL THERAPY MEDICAL/SOCIAL HISTORY   Problem List   Principal Problem:    Discitis of lumbar region    HOME SITUATION  Type of Home: House  Home Layout: Two level; Able to live on main level  Lives With: Spouse; Family  Toilet and Equipment: Toilet riser  Shower/Tub and Equipment: Walk-in shower; Shower chair  Other Equipment: -- (rw)  Drives: Yes  Patient Regularly Uses: Glasses    Stairs in Home: 7 stairs  Assistive Device(s) Used: none     Prior Level of [place]: Pt lives with his wife and family in a house. He was Mod I to I with ADLs and ambulation. Pt has a RTS.     SUBJECTIVE  \" It's a miracle. I feel so much better after they drained me.\"    OCCUPATIONAL THERAPY EXAMINATION   OBJECTIVE  Precautions: Spine  Fall Risk: Standard fall risk    WEIGHT BEARING RESTRICTION     PAIN ASSESSMENT  Rating: Unable to rate  Location: low back  Management  Techniques: Activity promotion; Body mechanics    ACTIVITY TOLERANCE                         O2 SATURATIONS       COGNITION  Overall Cognitive Status:  WFL - within functional limits    RANGE OF MOTION   Upper extremity ROM is within functional limits     STRENGTH ASSESSMENT  Upper extremity strength is within functional limits     ACTIVITIES OF DAILY LIVING ASSESSMENT  AM-PAC ‘6-Clicks’ Inpatient Daily Activity Short Form  How much help from another person does the patient currently need…  -   Putting on and taking off regular lower body clothing?: A Little  -   Bathing (including washing, rinsing, drying)?: A Little  -   Toileting, which includes using toilet, bedpan or urinal? : A Little  -   Putting on and taking off regular upper body clothing?: None  -   Taking care of personal grooming such as brushing teeth?: A Little  -   Eating meals?: None    AM-PAC Score:  Score: 20  Approx Degree of Impairment: 38.32%  Standardized Score (AM-PAC Scale): 42.03  CMS Modifier (G-Code): CJ    FUNCTIONAL TRANSFER ASSESSMENT  Sit to Stand: Chair  Chair: Stand-by Assist  Toilet Transfer: Stand-by Assist    BED MOBILITY  Rolling: Not Tested (reviewed log roll technique verbally and pt repored no questions or concerns)    BALANCE ASSESSMENT  Static Sitting: Supervision  Sitting Unilateral: Stand-by Assist  Static Standing: Stand-by Assist  Standing Unilateral: Contact Guard Assist    FUNCTIONAL ADL ASSESSMENT  Eating: Independent  Grooming Seated: Independent  Bathing Seated: Contact Guard Assist (Discussed use of LH sponge)  UB Dressing Seated: Independent  LB Dressing Seated: Moderate Assist (Mod A to don socks. Pt/family not interested in sock aide stating family will assist him at home. Reviewed spine precuations and no BLT. Pt instructed in use of reacher to don pants/shorts. Pt verbalized understanding.)  Toileting Standing: Modified Independent    THERAPEUTIC EXERCISE     Skilled Therapy Provided: RN approved session. Pt  received in the chair, agreeable to tx. Pt reports \"unrated\" pain and pressure in low back. Pt reports LLE pain has dissipated. He is aware of his spine precuations and proper body mechanics including log roll for bed mob. Pt requires assistance for LB dressing tasks such as donning socks. Pt describes too much pressure/pain when attempting to cross his legs. He was instructed in use of LHAE for LB dressing. Pt declines and states his family will assist him. Pt has a RTS at home. He is able to complete toilet transfer with SBA and toileting with SBA. Pt does not have further acute OT needs. Will sign off.     EDUCATION PROVIDED  Patient: Role of Occupational Therapy; Plan of Care; Discharge Recommendations; Functional Transfer Techniques; Adaptive Equipment Recommendations; Compensatory ADL Techniques  Patient's Response to Education: Verbalized Understanding  Family/Caregiver: Role of Occupational Therapy; Plan of Care; Discharge Recommendations; Adaptive Equipment Recommendations; Compensatory ADL Techniques  Family/Caregiver's Response to Education: Verbalized Understanding    The patient's Approx Degree of Impairment: 38.32% has been calculated based on documentation in the [facility] '6 clicks' Inpatient Daily Activity Short Form.  Research supports that patients with this level of impairment may benefit from home with HH.  Final disposition will be made by interdisciplinary medical team.    Patient End of Session: Up in chair;Needs met;Call light within reach;RN aware of session/findings;All patient questions and concerns addressed;Family present    Patient Evaluation Complexity Level:   Occupational Profile/Medical History LOW - Brief history including review of medical or therapy records    Specific performance deficits impacting engagement in ADL/IADL LOW  1 - 3 performance deficits    Client Assessment/Performance Deficits LOW - No comorbidities nor modifications of tasks    Clinical Decision Making LOW -  Analysis of occupational profile, problem-focused assessments, limited treatment options    Overall Complexity LOW     OT Session   Self-Care Home Management: 10 minutes  Therapeutic Activity: 10 minutes

## 2024-05-06 NOTE — BRIEF PROCEDURE NOTE
[facility]  part of [facility]  Procedure Note    Byron Garcia Patient Status:  Inpatient    1985 MRN [id number]   Location [facility] 4W/SW/SE Attending Byron Mcfarland MD   Hosp Day # 2 PCP RAJIV CALVILLO     Procedure: CT guided aspiration of paraspinal fluid collection    Pre-Procedure Diagnosis:  post laminectomy complex paraspinal fluid collection    Post-Procedure Diagnosis: same    Anesthesia:  Sedation    Findings:  5 ml turbid yellow fluid aspirated    Specimens: fluid sample for C&S    Blood Loss:  0      Complications:  None      Abhilash Callaway MD  2024

## 2024-05-06 NOTE — PROGRESS NOTES
[facility]  [facility] Progress [facility]     Byron LAMA Radha Patient Status:  Inpatient    1985 MRN [id number]   Location [facility] 4W/SW/SE Attending Byron Mcfarland MD   Hosp Day # 2 PCP RAJIV CALVILLO     Chief Complaint:   Chief Complaint   Patient presents with    Back Pain    Numbness Weakness        Subjective:     Patient seen lying in bed.  Wife at bedside. Seen post procedure.  Patient states still having pain and back, but improving with pain medications.  Pain is still limiting mobility.  Otherwise no new complaints today.    Objective:      Vital signs:  Vitals:    24 0855 24 0900 24 0910 24 0928   BP:  (!) 128/91 131/86 127/84   BP Location:    Right arm   Pulse: 83 76 84 74   Resp:  14 16 16   Temp:    97.7 °F (36.5 °C)   TempSrc:    Oral   SpO2: 95% 95% 93% 94%   Weight:           Intake/Output Summary (Last 24 hours) at 2024 0958  Last data filed at 2024 1745  Gross per 24 hour   Intake 800 ml   Output 1500 ml   Net -700 ml           Physical Exam:    GENERAL:  Awake and alert, in no acute distress.  HEART:  Regular rhythm.  Regular rate   LUNGS:  Air entry was good.  No crackles or wheezes   ABDOMEN: Soft and non-tender.    MUSCULOSKELETAL: Decreased range of motion of lower extremities due to pain  EXTREMITIES: No edema appreciated  PSYCHIATRIC: Normal mood    Diagnostic Data:    Labs:    Recent Labs   Lab 24  1238 24  0422   WBC 6.8 5.3 6.3   HGB 14.5 13.2 13.4   MCV 90.8 91.3 94.1   .0 181.0 188.0   INR  --  1.00  --        Recent Labs   Lab 24  1238 24  0524  0422   GLU 92 117* 102*   BUN 15 12 11   CREATSERUM 0.95 0.87 0.97   CA 9.7 9.2 9.5   ALB  --  3.8  --     141 140   K 4.2 3.9 4.2    107 107   CO2 30.0 27.0 29.0   ALKPHO  --  66  --    AST  --  14  --    ALT  --  14  --    BILT  --  0.3  --    TP  --  6.1  --             Estimated Creatinine Clearance: 94.6 mL/min (based on SCr of 0.97 mg/dL).    Recent Labs   Lab 05/05/24  0524   PTP 13.8   INR 1.00            COVID-19  No results found for: \"COVID19\"    Pro-Calcitonin  No results for input(s): \"PCT\" in the last 168 hours.    Cardiac  No results for input(s): \"TROP\", \"PBNP\" in the last 168 hours.    Inflammatory Markers  Recent Labs   Lab 05/05/24  1316   CRP 1.40*       Culture:  Hospital Encounter on 05/04/24   1. Blood Culture     Status: None (Preliminary result)    Collection Time: 05/04/24  4:03 PM    Specimen: Blood,peripheral   Result Value Ref Range    Blood Culture Result No Growth 1 Day N/A       MRI SPINE LUMBAR (CPT=72148)    Result Date: 5/4/2024  CONCLUSION:   Edema signal in the posterior half of the L4-5 intervertebral disc with small contiguous ventral epidural collection measuring 1.5 x 0.5 x 1.0 centimeter. The findings are suspicious for discitis with ventral epidural phlegmon or abscess.  The ventral epidural collection is stable from March 31 but there is increasing bone edema at L4 and 5. These changes are new from the Feb MR exams.   Stable complex T2 hyperintense fluid collection in the right paraspinal soft tissues extending down to the right laminectomy bed at L4-5 and extending along the right aspect of the thecal sac.  This may be sterile or infected    Dictated by (CST): Theo Guajardo MD on 5/04/2024 at 2:46 PM     Finalized by (CST): Theo Guajardo MD on 5/04/2024 at 3:20 PM                 Medications:    cefepime  1 g Intravenous Q8H    vancomycin  15 mg/kg Intravenous Q12H    buPROPion ER  300 mg Oral Before breakfast    gabapentin  600 mg Oral TID    [Held by provider] heparin  5,000 Units Subcutaneous Q8H AIDAN       Assessment & Plan:       Acute on chronic low back pain  -Acute worsening of pain around 48 hours prior to admission  -Development of radicular pain down lower extremities and difficulties with ambulation  -History of  previous epidural injections  -MRI lumbar spine reviewed.  Noted edema in the posterior half of L4-5.  Also noted small ventral epidural collection.  Findings suspicious for discitis with ventral epidural phlegmon or abscess.  -Neurosurgery, IR, ID were consulted   -S/p IR drainage of fluid collection on 5/6.  -Follow up fluid studies/cxs  -Continue antibiotics per ID.  -Pain control as able, close monitoring with IV narcotics  -PT/OT per neurosurgery     Elevated blood pressure reading  -Resolving  -Patient denies history of hypertension  -Likely related to pain  -Continue to monitor        Plan of care discussed with patient at bedside and with Rn. Discussed management/test result(s) with ID consultant    Quality:  DVT Prophylaxis: Heparin  CODE status: Full  Estimated date of discharge: TBD  Discharge is dependent on: clinical stability    Byron Mcfarland MD          This note was prepared using Dragon Medical voice recognition dictation software. As a result errors may occur. When identified these errors have been corrected. While every attempt is made to correct errors during dictation discrepancies may still exist

## 2024-05-06 NOTE — PROGRESS NOTES
INFECTIOUS DISEASE PROGRESS NOTE    Byron Garcia Patient Status:  Inpatient    1985 MRN [id number]   Location [facility] 4W/SW/SE Attending Byron Mcfarland MD   Hosp Day # 2 PCP RAJIV CALVILLO       SUBJECTIVE  Back pain improved today.     ASSESSMENT/PLAN:    Antibiotics: vancomycin 5/5-, cefepime 5/5-     # H/o chronic back pain, s/p 23 L4/5 laminectomy.  S/p 24 L4/5 microdiscectomy.  S/p lumbar injection on 24.   # Acute worsening lumbar pain with MRI findings of discitis and possible small epidural abscess   - s/p IR aspiration 24 with 5 mL of turbid yellow fluid - cx pending     PLAN:     - continue IV vancomycin, cefepime  - f/up cx  - may need IV abx on discharge  - monitor for improvement  - Follow fever curve, wbc  - Reviewed labs, micro, imaging reports  - d/w patient, wife, staff     History of Present Illness:  Byron Garcia is a a(n) 38 year old male with chronic back pain, s/p 23 L4/5 laminectomy.  Was doing well postop for few months and pain worsened.  S/p 24 L4/5 microdiscectomy.  Continued pain, had lumbar injection on 24.  Now presents with progressive worsening pain over past few days causing difficulty ambulating due to pain.  No fever, no night sweats or chills.  MRI showed Edema signal in the posterior half of the L4-5 intervertebral disc with small contiguous ventral epidural collection measuring 1.5 x 0.5 x 1.0 centimeter. The findings are suspicious for discitis with ventral epidural phlegmon or abscess.   IR planning aspiration on .      OBJECTIVE  /71 (BP Location: Right arm)   Pulse 78   Temp 97.5 °F (36.4 °C) (Temporal)   Resp 18   Wt 172 lb 11.2 oz (78.3 kg)   SpO2 93%   BMI 27.05 kg/m²     General: No acute distress. Alert.  HEENT: EOMI   Abdomen: Soft, nontender, nondistended.   Musculoskeletal: No edema  Integument: No rashes        Luis Fernando Salazar MD  [facility]  [facility]  [phone]

## 2024-05-06 NOTE — PHYSICAL THERAPY NOTE
PHYSICAL THERAPY EVALUATION - INPATIENT     Room Number: 402/402-A  Evaluation Date: 5/6/2024  Type of Evaluation: Initial   Physician Order: PT Eval and Treat    Presenting Problem: LBP and LE radiculopathy, S/P biopsy and drainage of fluid collection on 5/6  Co-Morbidities : L4-5 herniated disc, sciatica, cauda equina compression, S/P L4-5 laminectomy, discectomy 11/8/23, S/P lumbar injection 4/1/24  Reason for Therapy: Mobility Dysfunction and Discharge Planning    PHYSICAL THERAPY ASSESSMENT   Patient is a 38 year old male admitted 5/4/2024 for LBP and LE radiculopathy, S/P biopsy and drainage of fluid collection on 5/6.  Prior to admission, patient's baseline is independent without an assistive device, has not worked since initial spinal surgery, active with OP PT.  Patient is currently functioning below baseline with gait and stair negotiation.  Patient is requiring supervision using rolling walker as a result of the following impairments: pain and limited spinal ROM.  Physical Therapy will continue to follow for duration of hospitalization.    Patient will benefit from continued skilled PT Services at discharge to promote functional independence in home.  Anticipate patient will return home with OP PT.    PLAN  PT Treatment Plan: Body mechanics;Patient education;Family education;Stair training  Rehab Potential : Good  Frequency (Obs): 3-5x/week    PHYSICAL THERAPY MEDICAL/SOCIAL HISTORY   History related to current admission: S/P L4-5 laminectomy, discectomy 11/8/23, was independent without an assistive device, D/C'ed home with OP PT     Problem List  Principal Problem:    Discitis of lumbar region      HOME SITUATION  Home Situation  Type of Home: House  Home Layout: Two level;Able to live on main level  Stairs to Enter : 7  Railing: Yes  Lives With: Spouse;Family  Drives: Yes  Patient Owned Equipment: None  Patient Regularly Uses: Glasses     Prior Level of Hart: independent without an assistive  device, has not worked since initial spinal surgery, active with OP PT    SUBJECTIVE  \"They've been doing a lot of dry needling and that has been seeming to help.\"    PHYSICAL THERAPY EXAMINATION   OBJECTIVE  Precautions: Spine  Fall Risk: Standard fall risk    WEIGHT BEARING RESTRICTION  Weight Bearing Restriction: None                PAIN ASSESSMENT  Rating: Other (Comment) (did not quantify)  Location: low back, improved since procedure with no radiating pain  Management Techniques: Body mechanics;Activity promotion    COGNITION  Overall Cognitive Status:  WFL - within functional limits    RANGE OF MOTION AND STRENGTH ASSESSMENT  Upper extremity ROM and strength are within functional limits  BUEs within spinal precautions   Lower extremity ROM is within functional limits  BLEs within spinal precautions   Lower extremity strength is within functional limits  BLEs within spinal precautions     BALANCE  Static Sitting: Normal  Dynamic Sitting: Good  Static Standing: Good  Dynamic Standing: Fair +    NEUROLOGICAL FINDINGS     Coordination - Heel to Shin: Symmetrical     Sensation: WNL, no more radiating pain, resolved following fluid drainge          AM-PAC '6-Clicks' INPATIENT SHORT FORM - BASIC MOBILITY  How much difficulty does the patient currently have...  Patient Difficulty: Turning over in bed (including adjusting bedclothes, sheets and blankets)?: A Little   Patient Difficulty: Sitting down on and standing up from a chair with arms (e.g., wheelchair, bedside commode, etc.): None   Patient Difficulty: Moving from lying on back to sitting on the side of the bed?: A Little   How much help from another person does the patient currently need...   Help from Another: Moving to and from a bed to a chair (including a wheelchair)?: None   Help from Another: Need to walk in hospital room?: A Little   Help from Another: Climbing 3-5 steps with a railing?: A Little     AM-PAC Score:  Raw Score: 20   Approx Degree of  Impairment: 35.83%   Standardized Score (AM-PAC Scale): 47.67   CMS Modifier (G-Code): CJ    FUNCTIONAL ABILITY STATUS  Functional Mobility/Gait Assessment  Gait Assistance: Supervision  Distance (ft): 100'  Assistive Device: Rolling walker  Pattern: Within Functional Limits;Shuffle (slow pace with decreased BLE foot clearance and step length, stiff posture with no LOB)  Stairs: Stairs  How Many Stairs: deferred 2/2 pt refusal, agreeable to attempting next session  Sit to Stand: modified independent and supervision - from bedside chair    ADDITIONAL INFORMATION    Pt reports he has been sleeping in recliner at home, when feeling good able to log roll in/out of bed. Pt tolerating household and limited community distances using a rolling walker, anticipate would benefit from resuming OP PT for pain management and optimizing body mechanics. Pt appropriately stating and maintaining spinal precautions throughout, declining to perform stairs at this time but agreeable next session.    Patient received seated in bedside chair, agreeable to physical therapy evaluation. Vital signs monitored as noted above, no adverse symptoms and patient stable during session. Education with patient and patient's wife provided verbally on physical therapy plan of care, physiological benefits of out of bed mobility, and spinal precautions. Next session anticipate to progress stair negotiation.    Patient history and/or personal factors that may impact the plan of care include home accessibility concerns, prior surgeries (S/P L4-5 laminectomy, discectomy 11/8/23, S/P lumbar injection 4/1/24), and longstanding history of pain. Based on the physical therapy examination of the noted systems and functional activity/participation limitations, the patient presentation is stable given the patient demonstrates no significant barriers to meeting therapy goals.      Exercise/Education Provided:  Bed mobility  Body mechanics  Energy  conservation  Functional activity tolerated  Gait training  Posture  ROM  Transfer training    The patient's Approx Degree of Impairment: 35.83% has been calculated based on documentation in the [facility] '6 clicks' Inpatient Basic Mobility Short Form.  Research supports that patients with this level of impairment may benefit from no services, however recommend OP PT once medically cleared to optimize body mechanics, facilitate safe return to full activity level, and provide form of conservative pain management.  Final disposition will be made by interdisciplinary medical team.    Patient End of Session: Up in chair;Needs met;Call light within reach;All patient questions and concerns addressed;Family present;With  staff (handoff to OT)    CURRENT GOALS  Goals to be met by: 5/13/24  Patient Goal Patient's self-stated goal is: return home safely, decrease pain   Goal #1 Patient is able to demonstrate supine - sit EOB @ level: modified independent     Goal #1   Current Status    Goal #2 Patient is able to demonstrate transfers EOB to/from C at assistance level: independent with none     Goal #2  Current Status    Goal #3 Patient is able to ambulate 150 feet with assist device:  least restrictive assistive device or none  at assistance level: modified independent   Goal #3   Current Status    Goal #4 Patient will negotiate 7 stairs/one curb w/ assistive device and supervision   Goal #4   Current Status    Goal #5 Patient to demonstrate independence with home activity/exercise instructions provided to patient in preparation for discharge.   Goal #5   Current Status    Goal #6    Goal #6  Current Status      Patient Evaluation Complexity Level:  History Moderate - 1 or 2 personal factors and/or co-morbidities   Examination of body systems Moderate - addressing a total of 3 or more elements   Clinical Presentation Low- Stable   Clinical Decision Making  Low Complexity       Therapeutic Activity:  20 minutes      Perlita  Eusebio, PT, DPT  [facility]  Rehab Services - Physical Therapy  [id number]

## 2024-05-06 NOTE — PROGRESS NOTES
[facility]  part of [facility]    Neurosurgery Progress Note    Byron Garcia Patient Status:  Inpatient    1985 MRN [id number]   Location [facility] 4W/SW/SE Attending Byron Mcfarland MD   Hosp Day # 2 PCP RAJIV CALVILLO     Subjective:  Byron Garcia is a(n) 38 year old male with low back and left lower extremity pain 2 months status post redo L4-5 microdiscectomy.  Patient denies bowel or bladder changes, perineal numbness, lower extremity weakness.   Alert, orientated x3.  Cooperative.  No apparent distress.    Vital Signs:    Temp:  [96.9 °F (36.1 °C)-97.9 °F (36.6 °C)] 97.8 °F (36.6 °C)  Pulse:  [65-84] 71  Resp:  [14-24] 16  BP: (114-141)/(74-92) 114/74  SpO2:  [93 %-98 %] 96 %    I/O:  I/O last 3 completed shifts:  In: 800 [P.O.:300; I.V.:500]  Out: 1500 [Urine:1500]    Inpatient Medications:    Current Facility-Administered Medications:     heparin (Porcine) 5000 UNIT/ML injection 5,000 Units, 5,000 Units, Subcutaneous, Q8H AIDAN    HYDROcodone-acetaminophen (Norco)  MG per tab 2 tablet, 2 tablet, Oral, Q4H PRN    diazePAM (Valium) tab 10 mg, 10 mg, Oral, Q8H PRN    ceFEPIme (Maxipime) 1 g in sodium chloride 0.9% 100 mL IVPB-MBP, 1 g, Intravenous, Q8H    vancomycin (Vancocin) 1.25 g in sodium chloride 0.9% 250mL IVPB premix, 15 mg/kg, Intravenous, Q12H    buPROPion ER (Wellbutrin XL) 24 hr tab 300 mg, 300 mg, Oral, Before breakfast    gabapentin (Neurontin) tab 600 mg, 600 mg, Oral, TID    sodium chloride 0.9% infusion, , Intravenous, Continuous    acetaminophen (Tylenol Extra Strength) tab 500 mg, 500 mg, Oral, Q4H PRN    morphINE PF 2 MG/ML injection 1 mg, 1 mg, Intravenous, Q2H PRN **OR** morphINE PF 2 MG/ML injection 2 mg, 2 mg, Intravenous, Q2H PRN **OR** morphINE PF 4 MG/ML injection 4 mg, 4 mg, Intravenous, Q2H PRN    ondansetron (Zofran) 4 MG/2ML injection 4 mg, 4 mg, Intravenous, Q6H PRN    prochlorperazine (Compazine) 10 MG/2ML injection 5  mg, 5 mg, Intravenous, Q8H PRN    polyethylene glycol (PEG 3350) (Miralax) 17 g oral packet 17 g, 17 g, Oral, Daily PRN    sennosides (Senokot) tab 17.2 mg, 17.2 mg, Oral, Nightly PRN    bisacodyl (Dulcolax) 10 MG rectal suppository 10 mg, 10 mg, Rectal, Daily PRN    fleet enema (Fleet) 7-19 GM/118ML rectal enema 133 mL, 1 enema, Rectal, Once PRN    Labs:  Lab Results   Component Value Date    WBC 6.3 05/06/2024    HGB 13.4 05/06/2024    .0 05/06/2024    BUN 11 05/06/2024     05/06/2024    K 4.2 05/06/2024    CO2 29.0 05/06/2024     (H) 05/06/2024    ALB 3.8 05/05/2024    INR 1.00 05/05/2024    CRP 1.40 (H) 05/05/2024    ESRML 46 (H) 05/05/2024         Neurological Exam:  Strength:  5 out of 5 in bilateral lower extremities    Sensation:  Intact in bilateral lower extremities throughout    Incision:  Clean/dry/intact. No erythema, discharge, warmth.     Abdomen:  Soft, non-distended, non-tender, with no rebound or guarding.  No peritoneal signs.   Extremities:  Non-tender, no lower extremity edema noted.        Imaging:  No results found.    Assessment/Plan:  Principal Problem:    Discitis of lumbar region      Byron Garcia is a(n) 38 year old male presents to the ED 2 months status post redo L4-5 microdiscectomy with worsening low back and left lower extremity radiculopathy.  MRI lumbar spine reveals some T2 hyperintensity or an L5 vertebral bodies as well as a disc space.  This indicates scar tissue versus phlegmon and possible discitis versus Modic changes.  IR biopsy L4-5 disc space  Pain control  Medical management per hospitalist team  DVT prophylaxis heparin  Every 4 hours neurochecks    All questions and concerns were addressed. We appreciate the opportunity to participate in the care of this patient. Please do not hesitate to call our office ([phone]) with any issues.    Hang Rice PA-C  5/6/2024  11:28 AM

## 2024-05-07 LAB
ANION GAP SERPL CALC-SCNC: 3 MMOL/L (ref 0–18)
BASOPHILS # BLD AUTO: 0.05 X10(3) UL (ref 0–0.2)
BASOPHILS NFR BLD AUTO: 0.7 %
BUN BLD-MCNC: 14 MG/DL (ref 9–23)
BUN/CREAT SERPL: 13.9 (ref 10–20)
CALCIUM BLD-MCNC: 9.6 MG/DL (ref 8.7–10.4)
CHLORIDE SERPL-SCNC: 105 MMOL/L (ref 98–112)
CO2 SERPL-SCNC: 32 MMOL/L (ref 21–32)
CREAT BLD-MCNC: 1.01 MG/DL
DEPRECATED RDW RBC AUTO: 42.5 FL (ref 35.1–46.3)
EGFRCR SERPLBLD CKD-EPI 2021: 96 ML/MIN/1.73M2 (ref 60–?)
EOSINOPHIL # BLD AUTO: 0.12 X10(3) UL (ref 0–0.7)
EOSINOPHIL NFR BLD AUTO: 1.7 %
ERYTHROCYTE [DISTWIDTH] IN BLOOD BY AUTOMATED COUNT: 12.7 % (ref 11–15)
GLUCOSE BLD-MCNC: 102 MG/DL (ref 70–99)
HCT VFR BLD AUTO: 40.4 %
HGB BLD-MCNC: 14.1 G/DL
IMM GRANULOCYTES # BLD AUTO: 0.03 X10(3) UL (ref 0–1)
IMM GRANULOCYTES NFR BLD: 0.4 %
LYMPHOCYTES # BLD AUTO: 1.68 X10(3) UL (ref 1–4)
LYMPHOCYTES NFR BLD AUTO: 24.4 %
MCH RBC QN AUTO: 31.8 PG (ref 26–34)
MCHC RBC AUTO-ENTMCNC: 34.9 G/DL (ref 31–37)
MCV RBC AUTO: 91.2 FL
MONOCYTES # BLD AUTO: 0.59 X10(3) UL (ref 0.1–1)
MONOCYTES NFR BLD AUTO: 8.6 %
NEUTROPHILS # BLD AUTO: 4.42 X10 (3) UL (ref 1.5–7.7)
NEUTROPHILS # BLD AUTO: 4.42 X10(3) UL (ref 1.5–7.7)
NEUTROPHILS NFR BLD AUTO: 64.2 %
OSMOLALITY SERPL CALC.SUM OF ELEC: 291 MOSM/KG (ref 275–295)
PLATELET # BLD AUTO: 194 10(3)UL (ref 150–450)
POTASSIUM SERPL-SCNC: 4 MMOL/L (ref 3.5–5.1)
RBC # BLD AUTO: 4.43 X10(6)UL
SODIUM SERPL-SCNC: 140 MMOL/L (ref 136–145)
WBC # BLD AUTO: 6.9 X10(3) UL (ref 4–11)

## 2024-05-07 PROCEDURE — 99024 POSTOP FOLLOW-UP VISIT: CPT | Performed by: NEUROLOGICAL SURGERY

## 2024-05-07 PROCEDURE — 99233 SBSQ HOSP IP/OBS HIGH 50: CPT | Performed by: INTERNAL MEDICINE

## 2024-05-07 RX ORDER — HYDROCODONE BITARTRATE AND ACETAMINOPHEN 5; 325 MG/1; MG/1
1 TABLET ORAL EVERY 6 HOURS PRN
Status: DISCONTINUED | OUTPATIENT
Start: 2024-05-07 | End: 2024-05-08

## 2024-05-07 RX ORDER — HYDROCODONE BITARTRATE AND ACETAMINOPHEN 5; 325 MG/1; MG/1
2 TABLET ORAL EVERY 6 HOURS PRN
Status: DISCONTINUED | OUTPATIENT
Start: 2024-05-07 | End: 2024-05-08

## 2024-05-07 NOTE — CDS QUERY
Dear Dr Mcfarland:  Please clarify etiology of discitis and  epidural abscess   ( ) due to L4/5 microdiscectomy on 2/28/24   ( ) due to umbar injection on 4/1/24.   ( )   due to L4/5 laminectomy on 11/8/23     ( X)  Other (please specify): ______possibly related lumbar injection on 4/1/24_______________          Clinical indicators  MRI SPINE: The findings are suspicious for discitis with ventral epidural phlegmon or abscess. The ventral epidural collection is stable from March 31 but increasing bone edema at L4 and 5.These changes are new from the Feb MR exams.   Stable T2 fluid collection from the skin surface down to the right L4-5 laminectomy bed.Fluid extends along the right lateral aspect of the thecal sac and is likely contiguous with the ventral epidural collection mentioned    IR:   post laminectomy complex paraspinal fluid collection  Infectious disease notes: MRI findings of discitis and possible small epidural abscess    Risk factors: H/o chronic back pain, s/p 11/8/23 L4/5 laminectomy.  S/p 2/28/24 L4/5 microdiscectomy.  S/p lumbar injection on 4/1/24.         Treatment: , Pain control, PT/OT, vancomycin, cefepime,percutaneous aspiration of fluid collection in the paraspinous musculature    Use of terms such as suspected, possible, or probable (associated with a specific diagnosis that is being evaluated, monitored, or treated as if it exists) are acceptable and can be coded in the inpatient setting, when documented at the time of discharge.      Please add any additional documentation to your progress note and continue to document this through discharge.     If you have any questions, please contact Clinical Documentation  Specialist:  ECTOR Beltrán at [phone]     Thank You!     THIS FORM IS A PERMANENT PART OF THE MEDICAL RECORD

## 2024-05-07 NOTE — PROGRESS NOTES
[facility]  part of [facility]     Hospitalist Progress Note     Byron Garcia Patient Status:  Inpatient    1985 MRN [id number]   Location [facility] 4W/SW/SE Attending Byron Mcfarland MD   Hosp Day # 3 PCP RJAIV CALVILLO     Chief Complaint:   Chief Complaint   Patient presents with    Back Pain    Numbness Weakness        Subjective:     Patient seen lying in bed.  Wife at bedside.  Patient states continues to have back pain.  Able to move around a little better today.  Sitting having mild nausea and headache from narcotics.  Counseled on need to reduce and minimize as able.   Otherwise no new complaints today.    Objective:      Vital signs:  Vitals:    24 1534 24 0458 24 0753   BP: 122/71 126/87 130/88 125/85   BP Location: Right arm Right arm Right arm Right arm   Pulse: 78 78 72 75   Resp: 18    Temp: 97.5 °F (36.4 °C) 97.5 °F (36.4 °C) 97.6 °F (36.4 °C) 97.6 °F (36.4 °C)   TempSrc: Temporal Temporal Oral Axillary   SpO2: 93% 95% 98% 94%   Weight:           Intake/Output Summary (Last 24 hours) at 2024 1030  Last data filed at 2024 1114  Gross per 24 hour   Intake --   Output 600 ml   Net -600 ml           Physical Exam:    GENERAL:  Awake and alert, in no acute distress.  HEART:  Regular rhythm.  Regular rate   LUNGS:  Air entry was good.  No crackles or wheezes   ABDOMEN: Soft and non-tender.    MUSCULOSKELETAL: Decreased range of motion of lower extremities due to pain  EXTREMITIES: No edema appreciated  PSYCHIATRIC: Normal mood    Diagnostic Data:    Labs:    Recent Labs   Lab 24  04224  0442   WBC 5.3 6.3 6.9   HGB 13.2 13.4 14.1   MCV 91.3 94.1 91.2   .0 188.0 194.0   INR 1.00  --   --        Recent Labs   Lab 24  0422 24  0442   * 102* 102*   BUN 12 11 14   CREATSERUM 0.87 0.97 1.01   CA 9.2 9.5 9.6   ALB 3.8  --   --     140 140   K  3.9 4.2 4.0    107 105   CO2 27.0 29.0 32.0   ALKPHO 66  --   --    AST 14  --   --    ALT 14  --   --    BILT 0.3  --   --    TP 6.1  --   --            Estimated Creatinine Clearance: 90.9 mL/min (based on SCr of 1.01 mg/dL).    Recent Labs   Lab 05/05/24  0524   PTP 13.8   INR 1.00            COVID-19  No results found for: \"COVID19\"    Pro-Calcitonin  No results for input(s): \"PCT\" in the last 168 hours.    Cardiac  No results for input(s): \"TROP\", \"PBNP\" in the last 168 hours.    Inflammatory Markers  Recent Labs   Lab 05/05/24  1316   CRP 1.40*       Culture:  Hospital Encounter on 05/04/24   1. Aerobic Bacterial Culture     Status: None (Preliminary result)    Collection Time: 05/06/24  9:04 AM    Specimen: Lumbar disc; Body fluid, unspecified   Result Value Ref Range    Aerobic Culture Result No Growth 1 Day N/A    Aerobic Smear 2+ Gram positive cocci in pairs and clusters N/A    Aerobic Smear 2+ WBCs seen N/A   2. Blood Culture     Status: None (Preliminary result)    Collection Time: 05/04/24  4:03 PM    Specimen: Blood,peripheral   Result Value Ref Range    Blood Culture Result No Growth 2 Days N/A       CT ASPIRATION SKIN SUBCUTANEOUS/ABSCESS/CYST (CPT=77012/79541)    Result Date: 5/6/2024  CONCLUSION: CT-guided percutaneous aspiration of fluid collection in the paraspinous musculature    Dictated by (CST): Abhilash Callaway MD on 5/06/2024 at 11:58 AM     Finalized by (CST): Abhilash Callaway MD on 5/06/2024 at 12:01 PM          MRI SPINE LUMBAR (CPT=72148)    Result Date: 5/4/2024  CONCLUSION:   Edema signal in the posterior half of the L4-5 intervertebral disc with small contiguous ventral epidural collection measuring 1.5 x 0.5 x 1.0 centimeter. The findings are suspicious for discitis with ventral epidural phlegmon or abscess.  The ventral epidural collection is stable from March 31 but there is increasing bone edema at L4 and 5. These changes are new from the Feb MR exams.   Stable complex T2  hyperintense fluid collection in the right paraspinal soft tissues extending down to the right laminectomy bed at L4-5 and extending along the right aspect of the thecal sac.  This may be sterile or infected    Dictated by (CST): Theo Guajardo MD on 5/04/2024 at 2:46 PM     Finalized by (CST): Theo Guajardo MD on 5/04/2024 at 3:20 PM                 Medications:    heparin  5,000 Units Subcutaneous Q8H AIDAN    cefepime  1 g Intravenous Q8H    vancomycin  15 mg/kg Intravenous Q12H    buPROPion ER  300 mg Oral Before breakfast    gabapentin  600 mg Oral TID       Assessment & Plan:       Acute on chronic low back pain  -Acute worsening of pain around 48 hours prior to admission  -Development of radicular pain down lower extremities and difficulties with ambulation  -History of previous epidural injections  -MRI lumbar spine reviewed.  Noted edema in the posterior half of L4-5.  Also noted ventral epidural collection.  Findings suspicious for discitis with ventral epidural phlegmon or abscess.  -Neurosurgery, IR, ID consulted   -S/p IR drainage of fluid collection on 5/6.  -Follow up fluid studies/cxs,  Noting gram-positive cocci  -Continue antibiotics per ID.  -Pain control as able, close monitoring with narcotics, attempting de-escalation  -PT/OT per neurosurgery     Elevated blood pressure reading  -Resolving  -Patient denies history of hypertension  -Likely related to pain  -Continue to monitor        Plan of care discussed with patient at bedside and with Rn. Discussed management/test result(s) with ID consultant    Quality:  DVT Prophylaxis: Heparin  CODE status: Full  Estimated date of discharge: TBD  Discharge is dependent on: clinical stability    Byron Mcfarland MD          This note was prepared using Dragon Medical voice recognition dictation software. As a result errors may occur. When identified these errors have been corrected. While every attempt is made to correct errors during dictation  discrepancies may still exist

## 2024-05-07 NOTE — PLAN OF CARE
Alert and oriented x4 on room air. Continues to have tingling to LLE. PRN norco and valium given for pain. SCDs for DVT prophylaxis refusing heparin. Up x1 and a walker. IV abx given as ordered. Call light within reach.     Problem: Patient Centered Care  Goal: Patient preferences are identified and integrated in the patient's plan of care  Description: Interventions:  - What would you like us to know as we care for you?   - Provide timely, complete, and accurate information to patient/family  - Incorporate patient and family knowledge, values, beliefs, and cultural backgrounds into the planning and delivery of care  - Encourage patient/family to participate in care and decision-making at the level they choose  - Honor patient and family perspectives and choices  Outcome: Progressing     Problem: Patient/Family Goals  Goal: Patient/Family Long Term Goal  Description: Patient's Long Term Goal:     Interventions:  -   - See additional Care Plan goals for specific interventions  Outcome: Progressing  Goal: Patient/Family Short Term Goal  Description: Patient's Short Term Goal:     Interventions:   -   - See additional Care Plan goals for specific interventions  Outcome: Progressing     Problem: PAIN - ADULT  Goal: Verbalizes/displays adequate comfort level or patient's stated pain goal  Description: INTERVENTIONS:  - Encourage pt to monitor pain and request assistance  - Assess pain using appropriate pain scale  - Administer analgesics based on type and severity of pain and evaluate response  - Implement non-pharmacological measures as appropriate and evaluate response  - Consider cultural and social influences on pain and pain management  - Manage/alleviate anxiety  - Utilize distraction and/or relaxation techniques  - Monitor for opioid side effects  - Notify MD/LIP if interventions unsuccessful or patient reports new pain  - Anticipate increased pain with activity and pre-medicate as appropriate  Outcome:  Progressing     Problem: RISK FOR INFECTION - ADULT  Goal: Absence of fever/infection during anticipated neutropenic period  Description: INTERVENTIONS  - Monitor WBC  - Administer growth factors as ordered  - Implement neutropenic guidelines  Outcome: Progressing     Problem: SAFETY ADULT - FALL  Goal: Free from fall injury  Description: INTERVENTIONS:  - Assess pt frequently for physical needs  - Identify cognitive and physical deficits and behaviors that affect risk of falls.  - [place] fall precautions as indicated by assessment.  - Educate pt/family on patient safety including physical limitations  - Instruct pt to call for assistance with activity based on assessment  - Modify environment to reduce risk of injury  - Provide assistive devices as appropriate  - Consider OT/PT consult to assist with strengthening/mobility  - Encourage toileting schedule  Outcome: Progressing     Problem: DISCHARGE PLANNING  Goal: Discharge to home or other facility with appropriate resources  Description: INTERVENTIONS:  - Identify barriers to discharge w/pt and caregiver  - Include patient/family/discharge partner in discharge planning  - Arrange for needed discharge resources and transportation as appropriate  - Identify discharge learning needs (meds, wound care, etc)  - Arrange for interpreters to assist at discharge as needed  - Consider post-discharge preferences of patient/family/discharge partner  - Complete POLST form as appropriate  - Assess patient's ability to be responsible for managing their own health  - Refer to Case Management Department for coordinating discharge planning if the patient needs post-hospital services based on physician/LIP order or complex needs related to functional status, cognitive ability or social support system  Outcome: Progressing     Problem: SKIN/TISSUE INTEGRITY - ADULT  Goal: Skin integrity remains intact  Description: INTERVENTIONS  - Assess and document risk factors for pressure  ulcer development  - Assess and document skin integrity  - Monitor for areas of redness and/or skin breakdown  - Initiate interventions, skin care algorithm/standards of care as needed  Outcome: Progressing  Goal: Incision(s), wounds(s) or drain site(s) healing without S/S of infection  Description: INTERVENTIONS:  - Assess and document risk factors for pressure ulcer development  - Assess and document skin integrity  - Assess and document dressing/incision, wound bed, drain sites and surrounding tissue  - Implement wound care per orders  - Initiate isolation precautions as appropriate  - Initiate Pressure Ulcer prevention bundle as indicated  Outcome: Progressing     Problem: MUSCULOSKELETAL - ADULT  Goal: Return mobility to safest level of function  Description: INTERVENTIONS:  - Assess patient stability and activity tolerance for standing, transferring and ambulating w/ or w/o assistive devices  - Assist with transfers and ambulation using safe patient handling equipment as needed  - Ensure adequate protection for wounds/incisions during mobilization  - Obtain PT/OT consults as needed  - Advance activity as appropriate  - Communicate ordered activity level and limitations with patient/family  Outcome: Progressing  Goal: Maintain proper alignment of affected body part  Description: INTERVENTIONS:  - Support and protect limb and body alignment per provider's orders  - Instruct and reinforce with patient and family use of appropriate assistive device and precautions (e.g. spinal or hip dislocation precautions)  Outcome: Progressing     Problem: Impaired Functional Mobility  Goal: Achieve highest/safest level of mobility/gait  Description: Interventions:  - Assess patient's functional ability and stability  - Promote increasing activity/tolerance for mobility and gait  - Educate and engage patient/family in tolerated activity level and precautions  Outcome: Progressing     Problem: Impaired Activities of Daily  Living  Goal: Achieve highest/safest level of independence in self care  Description: Interventions:  - Assess ability and encourage patient to participate in ADLs to maximize function  - Promote sitting position while performing ADLs such as feeding, grooming, and bathing  - Educate and encourage patient/family in tolerated functional activity level and precautions during self-care  Outcome: Progressing

## 2024-05-07 NOTE — PROGRESS NOTES
INFECTIOUS DISEASE PROGRESS NOTE    Byron Garcia Patient Status:  Inpatient    1985 MRN [id number]   Location [facility] 4W/SW/SE Attending Byron Mcfarland MD   Hosp Day # 3 PCP RAJIV CALVILLO       SUBJECTIVE  Back pain improved today.     ASSESSMENT/PLAN:    Antibiotics: vancomycin 5/5-, cefepime 5/5-     # H/o chronic back pain, s/p 23 L4/5 laminectomy.  S/p 24 L4/5 microdiscectomy.  S/p lumbar injection on 24.   # Acute worsening lumbar pain with MRI findings of discitis and possible small epidural abscess   - s/p IR aspiration 24 with 5 mL of turbid yellow fluid - cx GPC pairs/clusters     PLAN:     - continue IV vancomycin, cefepime  - f/up cx  - anticipate IV abx on discharge  - monitor for improvement  - Follow fever curve, wbc  - Reviewed labs, micro, imaging reports  - d/w patient, Dr. Pittman, Dr. Dinero, Dr. Mcfarland     History of Present Illness:  Byron Garcia is a a(n) 38 year old male with chronic back pain, s/p 23 L4/5 laminectomy.  Was doing well postop for few months and pain worsened.  S/p 24 L4/5 microdiscectomy.  Continued pain, had lumbar injection on 24.  Now presents with progressive worsening pain over past few days causing difficulty ambulating due to pain.  No fever, no night sweats or chills.  MRI showed Edema signal in the posterior half of the L4-5 intervertebral disc with small contiguous ventral epidural collection measuring 1.5 x 0.5 x 1.0 centimeter. The findings are suspicious for discitis with ventral epidural phlegmon or abscess.   IR planning aspiration on .      OBJECTIVE  /89 (BP Location: Right arm)   Pulse 77   Temp 98.3 °F (36.8 °C) (Oral)   Resp 18   Wt 172 lb 11.2 oz (78.3 kg)   SpO2 93%   BMI 27.05 kg/m²     General: No acute distress. Alert.  HEENT: EOMI   Abdomen: Soft, nontender, nondistended.   Musculoskeletal: No edema  Integument: No rashes        Luis Fernando Salazar MD  [facility]  [phone]

## 2024-05-07 NOTE — PROGRESS NOTES
[facility]  part of [facility]    Neurosurgery Progress Note    Byron Garcia Patient Status:  Inpatient    1985 MRN [id number]   Location [facility] 4W/SW/SE Attending Byron Mcfarland MD   Hosp Day # 3 PCP RAJIV CALVILLO     Subjective:  Byron Garcia is a(n) 38 year old male who is now 2 months status post L4-5 redo MLD.  Patient reporting extreme low back pain and radiating left lower extremity pain with weightbearing or ambulating.  While lying patient is comfortable.  He denies bowel or bladder changes, saddle anesthesia, lower extremity weakness.  IR guided biopsy of L4-5 disc space reveals 2+ gram-positive cocci.  Patient currently on vancomycin and cefepime.  Alert, orientated x3.  Cooperative.  No apparent distress.    Vital Signs:    Temp:  [97.4 °F (36.3 °C)-97.8 °F (36.6 °C)] 97.6 °F (36.4 °C)  Pulse:  [71-84] 75  Resp:  [14-24] 18  BP: (114-141)/(71-92) 125/85  SpO2:  [93 %-98 %] 94 %    I/O:  I/O last 3 completed shifts:  In: -   Out: 1100 [Urine:1100]    Inpatient Medications:    Current Facility-Administered Medications:     heparin (Porcine) 5000 UNIT/ML injection 5,000 Units, 5,000 Units, Subcutaneous, Q8H AIDAN    HYDROcodone-acetaminophen (Norco)  MG per tab 2 tablet, 2 tablet, Oral, Q4H PRN **OR** HYDROcodone-acetaminophen (Norco)  MG per tab 1 tablet, 1 tablet, Oral, Q4H PRN    diazePAM (Valium) tab 10 mg, 10 mg, Oral, Q8H PRN    ceFEPIme (Maxipime) 1 g in sodium chloride 0.9% 100 mL IVPB-MBP, 1 g, Intravenous, Q8H    vancomycin (Vancocin) 1.25 g in sodium chloride 0.9% 250mL IVPB premix, 15 mg/kg, Intravenous, Q12H    buPROPion ER (Wellbutrin XL) 24 hr tab 300 mg, 300 mg, Oral, Before breakfast    gabapentin (Neurontin) tab 600 mg, 600 mg, Oral, TID    sodium chloride 0.9% infusion, , Intravenous, Continuous    acetaminophen (Tylenol Extra Strength) tab 500 mg, 500 mg, Oral, Q4H PRN    morphINE PF 2 MG/ML injection 1 mg, 1 mg,  Intravenous, Q2H PRN **OR** morphINE PF 2 MG/ML injection 2 mg, 2 mg, Intravenous, Q2H PRN **OR** morphINE PF 4 MG/ML injection 4 mg, 4 mg, Intravenous, Q2H PRN    ondansetron (Zofran) 4 MG/2ML injection 4 mg, 4 mg, Intravenous, Q6H PRN    prochlorperazine (Compazine) 10 MG/2ML injection 5 mg, 5 mg, Intravenous, Q8H PRN    polyethylene glycol (PEG 3350) (Miralax) 17 g oral packet 17 g, 17 g, Oral, Daily PRN    sennosides (Senokot) tab 17.2 mg, 17.2 mg, Oral, Nightly PRN    bisacodyl (Dulcolax) 10 MG rectal suppository 10 mg, 10 mg, Rectal, Daily PRN    fleet enema (Fleet) 7-19 GM/118ML rectal enema 133 mL, 1 enema, Rectal, Once PRN    Labs:  Lab Results   Component Value Date    WBC 6.9 05/07/2024    HGB 14.1 05/07/2024    .0 05/07/2024    BUN 14 05/07/2024     05/07/2024    K 4.0 05/07/2024    CO2 32.0 05/07/2024     (H) 05/07/2024    ALB 3.8 05/05/2024    INR 1.00 05/05/2024    CRP 1.40 (H) 05/05/2024    ESRML 46 (H) 05/05/2024         Neurological Exam:  Strength:  5 out of 5 in bilateral lower extremities    Sensation:  Intact in bilateral lower extremities throughout    Incision:  Clean/dry/intact. No erythema, discharge, warmth.     Abdomen:  Soft, non-distended, non-tender, with no rebound or guarding.  No peritoneal signs.   Extremities:  Non-tender, no lower extremity edema noted.        Imaging:  CT ASPIRATION SKIN SUBCUTANEOUS/ABSCESS/CYST (CPT=77012/73686)    Result Date: 5/6/2024  CONCLUSION: CT-guided percutaneous aspiration of fluid collection in the paraspinous musculature    Dictated by (CST): Abhilash Callaway MD on 5/06/2024 at 11:58 AM     Finalized by (CST): Abhilash Callaway MD on 5/06/2024 at 12:01 PM           Assessment/Plan:  Principal Problem:    Discitis of lumbar region      Byron Garcia is a(n) 38 year old male with extreme low back pain and radiating left lower extremity pain.  There is concern for L4-5 discitis on MRI and IR guided biopsy reveals 2+ gram-positive  cocci.  Patient currently on vancomycin and cefepime for prophylaxis.  Appreciate recommendations per infectious disease for antibiotic use  Pain control  Medical management per hospitalist team  DVT prophylaxis heparin  Every 4 hour neurochecks  Prior to any neurosurgical intervention patient will need to be cleared of infection.  He will follow-up in our office thereafter    All questions and concerns were addressed. We appreciate the opportunity to participate in the care of this patient. Please do not hesitate to call our office ([phone]) with any issues.    Hang Rice PA-C  5/7/2024  8:20 AM

## 2024-05-07 NOTE — PHYSICAL THERAPY NOTE
PHYSICAL THERAPY TREATMENT NOTE - INPATIENT     Room Number: 402/402-A       Presenting Problem: LBP and LE radiculopathy, S/P biopsy and drainage of fluid collection on   Co-Morbidities : L4-5 herniated disc, sciatica, cauda equina compression, S/P L4-5 laminectomy, discectomy 23, S/P lumbar injection 24    Problem List  Principal Problem:    Discitis of lumbar region      PHYSICAL THERAPY ASSESSMENT   Patient demonstrates good  progress this session, goals  remain in progress.    Patient continues to function below baseline with bed mobility, transfers, and gait.  Contributing factors to remaining limitations include decreased functional strength, decreased endurance/aerobic capacity, and pain.  Next session anticipate patient to progress bed mobility, transfers, and gait.  Physical Therapy will continue to follow patient for duration of hospitalization.    Patient continues to benefit from continued skilled PT services: at discharge to promote functional independence in home.  Anticipate patient will return home with home health PT.    PLAN  PT Treatment Plan: Bed mobility;Coordination;Endurance;Gait training  Frequency (Obs): 3-5x/week    SUBJECTIVE  Pt reports being ready for PT RX    OBJECTIVE  Precautions: Spine;Bed/chair alarm    WEIGHT BEARING RESTRICTION                PAIN ASSESSMENT   Ratin  Location: low back, improved since procedure with no radiating pain  Management Techniques: Body mechanics;Activity promotion    BALANCE  Static Sitting: Normal  Dynamic Sitting: Good  Static Standing: Good  Dynamic Standing: Fair +    ACTIVITY TOLERANCE                          O2 WALK       AM-PAC '6-Clicks' INPATIENT SHORT FORM - BASIC MOBILITY  How much difficulty does the patient currently have...  Patient Difficulty: Turning over in bed (including adjusting bedclothes, sheets and blankets)?: A Little   Patient Difficulty: Sitting down on and standing up from a chair with arms (e.g., wheelchair,  bedside commode, etc.): None   Patient Difficulty: Moving from lying on back to sitting on the side of the bed?: A Little   How much help from another person does the patient currently need...   Help from Another: Moving to and from a bed to a chair (including a wheelchair)?: None   Help from Another: Need to walk in hospital room?: A Little   Help from Another: Climbing 3-5 steps with a railing?: A Little     AM-PAC Score:  Raw Score: 20   Approx Degree of Impairment: 35.83%   Standardized Score (AM-PAC Scale): 47.67   CMS Modifier (G-Code): CJ    FUNCTIONAL ABILITY STATUS  Functional Mobility/Gait Assessment  Gait Assistance: Contact guard assist  Distance (ft): 100  Assistive Device: Rolling walker  Pattern: Shuffle  Stairs: Stairs  How Many Stairs: 4  Device: 1 Rail  Assist: Contact guard assist  Pattern: Ascend and Descend  Rolling: minimal assist  Supine to Sit: minimal assist  Sit to Supine: minimal assist  Sit to Stand: minimal assist    Additional information: Pt seen daily.Min a for bed mobility and transfer.Extra time provided to complete task.EOB sitting balanc e activity with emphasis on core stabilization.Family present;family education;all questions and concerns addressed.Pt amb 100 ft with RW;Provided cuing for gait pattern as well as for postural awareness.Navigated 4 stairs with CGA.Spinal precautions reviewed;education.There ex.    The patient's Approx Degree of Impairment: 35.83% has been calculated based on documentation in the [facility] '6 clicks' Inpatient Daily Activity Short Form.  Research supports that patients with this level of impairment may benefit from Home  with Home Health PT.  Final disposition will be made by interdisciplinary medical team.    THERAPEUTIC EXERCISES  Lower Extremity Ankle pumps  Glut sets  Quad sets     Position Supine       Patient End of Session: Up in chair;Call light within reach;RN aware of session/findings;All patient questions and concerns addressed    CURRENT  GOALS     Patient Goal Patient's self-stated goal is: return home safely, decrease pain   Goal #1 Patient is able to demonstrate supine - sit EOB @ level: modified independent     Goal #1   Current Status Min a   Goal #2 Patient is able to demonstrate transfers EOB to/from BSC at assistance level: independent with none     Goal #2  Current Status Darin    Goal #3 Patient is able to ambulate 150 feet with assist device: least restrictive assistive device or none at assistance level: modified independent   Goal #3   Current Status Pt amb 100 ft with RW and CGa   Goal #4 Patient will negotiate 7 stairs/one curb w/ assistive device and supervision   Goal #4   Current Status Navigated 4 stairs with CGA   Goal #5 Patient to demonstrate independence with home activity/exercise instructions provided to patient in preparation for discharge.   Goal #5   Current Status In progress   Goal #6    Goal #6  Current Status      Gait Training: 15 minutes  Therapeutic Activity: 15 minutes  Neuromuscular Re-education:  minutes  Therapeutic Exercise: 15 minutes  Canalith Repositioning:  minutes  Manual Therapy:  minutes  Can add/delete any of these

## 2024-05-08 ENCOUNTER — APPOINTMENT (OUTPATIENT)
Dept: PICC SERVICES | Facility: HOSPITAL | Age: 39
DRG: 867 | End: 2024-05-08
Attending: INTERNAL MEDICINE
Payer: COMMERCIAL

## 2024-05-08 VITALS
HEART RATE: 89 BPM | RESPIRATION RATE: 16 BRPM | TEMPERATURE: 98 F | SYSTOLIC BLOOD PRESSURE: 109 MMHG | DIASTOLIC BLOOD PRESSURE: 66 MMHG | BODY MASS INDEX: 27 KG/M2 | WEIGHT: 172.69 LBS | OXYGEN SATURATION: 97 %

## 2024-05-08 LAB
ANION GAP SERPL CALC-SCNC: 6 MMOL/L (ref 0–18)
BASOPHILS # BLD AUTO: 0.07 X10(3) UL (ref 0–0.2)
BASOPHILS NFR BLD AUTO: 0.9 %
BUN BLD-MCNC: 13 MG/DL (ref 9–23)
BUN/CREAT SERPL: 11.9 (ref 10–20)
CALCIUM BLD-MCNC: 10.4 MG/DL (ref 8.7–10.4)
CHLORIDE SERPL-SCNC: 103 MMOL/L (ref 98–112)
CK SERPL-CCNC: 56 U/L
CO2 SERPL-SCNC: 33 MMOL/L (ref 21–32)
CREAT BLD-MCNC: 1.09 MG/DL
DEPRECATED RDW RBC AUTO: 41.6 FL (ref 35.1–46.3)
EGFRCR SERPLBLD CKD-EPI 2021: 88 ML/MIN/1.73M2 (ref 60–?)
EOSINOPHIL # BLD AUTO: 0.13 X10(3) UL (ref 0–0.7)
EOSINOPHIL NFR BLD AUTO: 1.6 %
ERYTHROCYTE [DISTWIDTH] IN BLOOD BY AUTOMATED COUNT: 12.6 % (ref 11–15)
GLUCOSE BLD-MCNC: 104 MG/DL (ref 70–99)
HCT VFR BLD AUTO: 43.9 %
HGB BLD-MCNC: 15.4 G/DL
IMM GRANULOCYTES # BLD AUTO: 0.06 X10(3) UL (ref 0–1)
IMM GRANULOCYTES NFR BLD: 0.7 %
LYMPHOCYTES # BLD AUTO: 1.62 X10(3) UL (ref 1–4)
LYMPHOCYTES NFR BLD AUTO: 20.2 %
MCH RBC QN AUTO: 32 PG (ref 26–34)
MCHC RBC AUTO-ENTMCNC: 35.1 G/DL (ref 31–37)
MCV RBC AUTO: 91.1 FL
MONOCYTES # BLD AUTO: 0.69 X10(3) UL (ref 0.1–1)
MONOCYTES NFR BLD AUTO: 8.6 %
NEUTROPHILS # BLD AUTO: 5.46 X10 (3) UL (ref 1.5–7.7)
NEUTROPHILS # BLD AUTO: 5.46 X10(3) UL (ref 1.5–7.7)
NEUTROPHILS NFR BLD AUTO: 68 %
OSMOLALITY SERPL CALC.SUM OF ELEC: 294 MOSM/KG (ref 275–295)
PLATELET # BLD AUTO: 219 10(3)UL (ref 150–450)
POTASSIUM SERPL-SCNC: 4.6 MMOL/L (ref 3.5–5.1)
RBC # BLD AUTO: 4.82 X10(6)UL
SODIUM SERPL-SCNC: 142 MMOL/L (ref 136–145)
VANCOMYCIN PEAK SERPL-MCNC: 35.5 UG/ML (ref 30–50)
WBC # BLD AUTO: 8 X10(3) UL (ref 4–11)

## 2024-05-08 PROCEDURE — 02HV33Z INSERTION OF INFUSION DEVICE INTO SUPERIOR VENA CAVA, PERCUTANEOUS APPROACH: ICD-10-PCS | Performed by: INTERNAL MEDICINE

## 2024-05-08 PROCEDURE — 99024 POSTOP FOLLOW-UP VISIT: CPT | Performed by: NEUROLOGICAL SURGERY

## 2024-05-08 PROCEDURE — 99233 SBSQ HOSP IP/OBS HIGH 50: CPT | Performed by: HOSPITALIST

## 2024-05-08 RX ORDER — LIDOCAINE HYDROCHLORIDE 10 MG/ML
5 INJECTION, SOLUTION EPIDURAL; INFILTRATION; INTRACAUDAL; PERINEURAL
Status: COMPLETED | OUTPATIENT
Start: 2024-05-08 | End: 2024-05-08

## 2024-05-08 NOTE — PLAN OF CARE
Alert and oriented x4 on room air. LLE tingling w/ ambulating, improves w/ rest. PRN norco and valium given for pain. SCDs for DVT prophylaxis, refusing heparin. Up x1 and a walker. IV abx given as ordered. Call light within reach.     Problem: Patient Centered Care  Goal: Patient preferences are identified and integrated in the patient's plan of care  Description: Interventions:  - What would you like us to know as we care for you?   - Provide timely, complete, and accurate information to patient/family  - Incorporate patient and family knowledge, values, beliefs, and cultural backgrounds into the planning and delivery of care  - Encourage patient/family to participate in care and decision-making at the level they choose  - Honor patient and family perspectives and choices  Outcome: Progressing     Problem: Patient/Family Goals  Goal: Patient/Family Long Term Goal  Description: Patient's Long Term Goal:     Interventions:  -   - See additional Care Plan goals for specific interventions  Outcome: Progressing  Goal: Patient/Family Short Term Goal  Description: Patient's Short Term Goal:     Interventions:   -   - See additional Care Plan goals for specific interventions  Outcome: Progressing     Problem: PAIN - ADULT  Goal: Verbalizes/displays adequate comfort level or patient's stated pain goal  Description: INTERVENTIONS:  - Encourage pt to monitor pain and request assistance  - Assess pain using appropriate pain scale  - Administer analgesics based on type and severity of pain and evaluate response  - Implement non-pharmacological measures as appropriate and evaluate response  - Consider cultural and social influences on pain and pain management  - Manage/alleviate anxiety  - Utilize distraction and/or relaxation techniques  - Monitor for opioid side effects  - Notify MD/LIP if interventions unsuccessful or patient reports new pain  - Anticipate increased pain with activity and pre-medicate as  appropriate  Outcome: Progressing     Problem: RISK FOR INFECTION - ADULT  Goal: Absence of fever/infection during anticipated neutropenic period  Description: INTERVENTIONS  - Monitor WBC  - Administer growth factors as ordered  - Implement neutropenic guidelines  Outcome: Progressing     Problem: SAFETY ADULT - FALL  Goal: Free from fall injury  Description: INTERVENTIONS:  - Assess pt frequently for physical needs  - Identify cognitive and physical deficits and behaviors that affect risk of falls.  - [place] fall precautions as indicated by assessment.  - Educate pt/family on patient safety including physical limitations  - Instruct pt to call for assistance with activity based on assessment  - Modify environment to reduce risk of injury  - Provide assistive devices as appropriate  - Consider OT/PT consult to assist with strengthening/mobility  - Encourage toileting schedule  Outcome: Progressing     Problem: DISCHARGE PLANNING  Goal: Discharge to home or other facility with appropriate resources  Description: INTERVENTIONS:  - Identify barriers to discharge w/pt and caregiver  - Include patient/family/discharge partner in discharge planning  - Arrange for needed discharge resources and transportation as appropriate  - Identify discharge learning needs (meds, wound care, etc)  - Arrange for interpreters to assist at discharge as needed  - Consider post-discharge preferences of patient/family/discharge partner  - Complete POLST form as appropriate  - Assess patient's ability to be responsible for managing their own health  - Refer to Case Management Department for coordinating discharge planning if the patient needs post-hospital services based on physician/LIP order or complex needs related to functional status, cognitive ability or social support system  Outcome: Progressing     Problem: SKIN/TISSUE INTEGRITY - ADULT  Goal: Skin integrity remains intact  Description: INTERVENTIONS  - Assess and document risk  factors for pressure ulcer development  - Assess and document skin integrity  - Monitor for areas of redness and/or skin breakdown  - Initiate interventions, skin care algorithm/standards of care as needed  Outcome: Progressing  Goal: Incision(s), wounds(s) or drain site(s) healing without S/S of infection  Description: INTERVENTIONS:  - Assess and document risk factors for pressure ulcer development  - Assess and document skin integrity  - Assess and document dressing/incision, wound bed, drain sites and surrounding tissue  - Implement wound care per orders  - Initiate isolation precautions as appropriate  - Initiate Pressure Ulcer prevention bundle as indicated  Outcome: Progressing     Problem: MUSCULOSKELETAL - ADULT  Goal: Return mobility to safest level of function  Description: INTERVENTIONS:  - Assess patient stability and activity tolerance for standing, transferring and ambulating w/ or w/o assistive devices  - Assist with transfers and ambulation using safe patient handling equipment as needed  - Ensure adequate protection for wounds/incisions during mobilization  - Obtain PT/OT consults as needed  - Advance activity as appropriate  - Communicate ordered activity level and limitations with patient/family  Outcome: Progressing  Goal: Maintain proper alignment of affected body part  Description: INTERVENTIONS:  - Support and protect limb and body alignment per provider's orders  - Instruct and reinforce with patient and family use of appropriate assistive device and precautions (e.g. spinal or hip dislocation precautions)  Outcome: Progressing     Problem: Impaired Functional Mobility  Goal: Achieve highest/safest level of mobility/gait  Description: Interventions:  - Assess patient's functional ability and stability  - Promote increasing activity/tolerance for mobility and gait  - Educate and engage patient/family in tolerated activity level and precautions  Outcome: Progressing     Problem: Impaired  Activities of Daily Living  Goal: Achieve highest/safest level of independence in self care  Description: Interventions:  - Assess ability and encourage patient to participate in ADLs to maximize function  - Promote sitting position while performing ADLs such as feeding, grooming, and bathing  - Educate and encourage patient/family in tolerated functional activity level and precautions during self-care  Outcome: Progressing

## 2024-05-08 NOTE — DISCHARGE INSTRUCTIONS
[facility]  [address], [place] [address]  Phone: [phone]    Appointment: Appointment: Thursday, May 9, 2024 @ 10:45am      MRI of lumbar spine in 4 weeks per Infectious disease

## 2024-05-08 NOTE — PLAN OF CARE
Patient AOX4. Room  air. General diet. Tolerated well. Voiding freely. PRN miralax given. PRN valium give. PRN norco given. IV abx given. Awaiting cx. Call light within reach    Problem: Patient Centered Care  Goal: Patient preferences are identified and integrated in the patient's plan of care  Description: Interventions:  - What would you like us to know as we care for you?   - Provide timely, complete, and accurate information to patient/family  - Incorporate patient and family knowledge, values, beliefs, and cultural backgrounds into the planning and delivery of care  - Encourage patient/family to participate in care and decision-making at the level they choose  - Honor patient and family perspectives and choices  Outcome: Progressing     Problem: Patient/Family Goals  Goal: Patient/Family Long Term Goal  Description: Patient's Long Term Goal:     Interventions:  -   - See additional Care Plan goals for specific interventions  Outcome: Progressing  Goal: Patient/Family Short Term Goal  Description: Patient's Short Term Goal:     Interventions:   -  - See additional Care Plan goals for specific interventions  Outcome: Progressing     Problem: PAIN - ADULT  Goal: Verbalizes/displays adequate comfort level or patient's stated pain goal  Description: INTERVENTIONS:  - Encourage pt to monitor pain and request assistance  - Assess pain using appropriate pain scale  - Administer analgesics based on type and severity of pain and evaluate response  - Implement non-pharmacological measures as appropriate and evaluate response  - Consider cultural and social influences on pain and pain management  - Manage/alleviate anxiety  - Utilize distraction and/or relaxation techniques  - Monitor for opioid side effects  - Notify MD/LIP if interventions unsuccessful or patient reports new pain  - Anticipate increased pain with activity and pre-medicate as appropriate  Outcome: Progressing     Problem: RISK FOR INFECTION -  ADULT  Goal: Absence of fever/infection during anticipated neutropenic period  Description: INTERVENTIONS  - Monitor WBC  - Administer growth factors as ordered  - Implement neutropenic guidelines  Outcome: Progressing     Problem: SAFETY ADULT - FALL  Goal: Free from fall injury  Description: INTERVENTIONS:  - Assess pt frequently for physical needs  - Identify cognitive and physical deficits and behaviors that affect risk of falls.  - [place] fall precautions as indicated by assessment.  - Educate pt/family on patient safety including physical limitations  - Instruct pt to call for assistance with activity based on assessment  - Modify environment to reduce risk of injury  - Provide assistive devices as appropriate  - Consider OT/PT consult to assist with strengthening/mobility  - Encourage toileting schedule  Outcome: Progressing     Problem: DISCHARGE PLANNING  Goal: Discharge to home or other facility with appropriate resources  Description: INTERVENTIONS:  - Identify barriers to discharge w/pt and caregiver  - Include patient/family/discharge partner in discharge planning  - Arrange for needed discharge resources and transportation as appropriate  - Identify discharge learning needs (meds, wound care, etc)  - Arrange for interpreters to assist at discharge as needed  - Consider post-discharge preferences of patient/family/discharge partner  - Complete POLST form as appropriate  - Assess patient's ability to be responsible for managing their own health  - Refer to Case Management Department for coordinating discharge planning if the patient needs post-hospital services based on physician/LIP order or complex needs related to functional status, cognitive ability or social support system  Outcome: Progressing     Problem: SKIN/TISSUE INTEGRITY - ADULT  Goal: Skin integrity remains intact  Description: INTERVENTIONS  - Assess and document risk factors for pressure ulcer development  - Assess and document skin  integrity  - Monitor for areas of redness and/or skin breakdown  - Initiate interventions, skin care algorithm/standards of care as needed  Outcome: Progressing  Goal: Incision(s), wounds(s) or drain site(s) healing without S/S of infection  Description: INTERVENTIONS:  - Assess and document risk factors for pressure ulcer development  - Assess and document skin integrity  - Assess and document dressing/incision, wound bed, drain sites and surrounding tissue  - Implement wound care per orders  - Initiate isolation precautions as appropriate  - Initiate Pressure Ulcer prevention bundle as indicated  Outcome: Progressing     Problem: MUSCULOSKELETAL - ADULT  Goal: Return mobility to safest level of function  Description: INTERVENTIONS:  - Assess patient stability and activity tolerance for standing, transferring and ambulating w/ or w/o assistive devices  - Assist with transfers and ambulation using safe patient handling equipment as needed  - Ensure adequate protection for wounds/incisions during mobilization  - Obtain PT/OT consults as needed  - Advance activity as appropriate  - Communicate ordered activity level and limitations with patient/family  Outcome: Progressing  Goal: Maintain proper alignment of affected body part  Description: INTERVENTIONS:  - Support and protect limb and body alignment per provider's orders  - Instruct and reinforce with patient and family use of appropriate assistive device and precautions (e.g. spinal or hip dislocation precautions)  Outcome: Progressing     Problem: Impaired Functional Mobility  Goal: Achieve highest/safest level of mobility/gait  Description: Interventions:  - Assess patient's functional ability and stability  - Promote increasing activity/tolerance for mobility and gait  - Educate and engage patient/family in tolerated activity level and precautions    Outcome: Progressing     Problem: Impaired Activities of Daily Living  Goal: Achieve highest/safest level of  independence in self care  Description: Interventions:  - Assess ability and encourage patient to participate in ADLs to maximize function  - Promote sitting position while performing ADLs such as feeding, grooming, and bathing  - Educate and encourage patient/family in tolerated functional activity level and precautions during self-care    Outcome: Progressing

## 2024-05-08 NOTE — PROGRESS NOTES
[facility]  part of [facility]    Progress Note    Byron Garcia Patient Status:  Inpatient    1985 MRN [id number]   Location [facility] 4W/SW/SE Attending Kalen Torres MD   Hosp Day # 4 PCP RAJIV CALVILLO     Chief Complaint:     Discitis of Lumbar region    Subjective:   Subjective:    Patient seen and examined this morning  No acute events overnight  No new complaints  Afebrie, normotensive    Objective:   Blood pressure 109/66, pulse 89, temperature 98.3 °F (36.8 °C), temperature source Oral, resp. rate 16, weight 172 lb 11.2 oz (78.3 kg), SpO2 97%.  Physical Exam    General: Patient is alert and oriented x3 does not appear to be in acute distress at this time  HEENT: EOMI PERRLA, atraumatic normocephalic  Cardiac: S1-S2 appreciated  Lungs: Good air entry bilaterally clear to auscultation  Abdomen: Soft nontender nondistended positive bowel sounds  Ext: Peripheral pulses are positive  Neuro: No focal deficits noted  Psych: Normal mood  Skin: No rashes noted  MSK: Full range of motion intact      Results:   Lab Results   Component Value Date    WBC 8.0 2024    HGB 15.4 2024    HCT 43.9 2024    .0 2024    CREATSERUM 1.09 2024    BUN 13 2024     2024    K 4.6 2024     2024    CO2 33.0 (H) 2024     (H) 2024    CA 10.4 2024    ALB 3.8 2024    ALKPHO 66 2024    BILT 0.3 2024    TP 6.1 2024    AST 14 2024    ALT 14 2024    INR 1.00 2024    ESRML 46 (H) 2024    CRP 1.40 (H) 2024    MG 1.9 2024    CK 56 2024       No results found.        Assessment & Plan:       Acute on chronic low back pain  -Acute worsening of pain around 48 hours prior to admission  -Development of radicular pain down lower extremities and difficulties with ambulation  -History of previous epidural injections  -MRI lumbar spine reviewed.   Noted edema in the posterior half of L4-5.  Also noted ventral epidural collection.  Findings suspicious for discitis with ventral epidural phlegmon or abscess.  -Neurosurgery, IR, ID consulted   -S/p IR drainage of fluid collection on 5/6.  -Follow up fluid studies/cxs,  Noting gram-positive cocci  -Continue antibiotics per ID.  -Pain control as able, close monitoring with narcotics, attempting de-escalation  -PT/OT per neurosurgery     Elevated blood pressure reading  -Resolving  -Patient denies history of hypertension  -Likely related to pain  -Continue to monitor           Plan of care discussed with patient at bedside and with Rn. Discussed management/test result(s) with ID consultant     Quality:  DVT Prophylaxis: Heparin  CODE status: Full  Estimated date of discharge: TBD  Discharge is dependent on: clinical stability    Global A/P  -awaiting picc line placement  -Reviewed previous consultant notes  -Reviewed CBC, BMP, Mag, and Phos  -Reviewed tests ordered  -Repeat labs in am  -MDM: High, severe exacerbation of chronic illness posing a threat to life. IV medications requiring close inpatient monitoring.         Kalen Torres MD  5/8/2024

## 2024-05-08 NOTE — PLAN OF CARE
Patient cleared for discharge. Transported via private car with family    Problem: Patient Centered Care  Goal: Patient preferences are identified and integrated in the patient's plan of care  Description: Interventions:  - What would you like us to know as we care for you? 2 Little girls at home  - Provide timely, complete, and accurate information to patient/family  - Incorporate patient and family knowledge, values, beliefs, and cultural backgrounds into the planning and delivery of care  - Encourage patient/family to participate in care and decision-making at the level they choose  - Honor patient and family perspectives and choices  Outcome: Adequate for Discharge     Problem: Patient/Family Goals  Goal: Patient/Family Long Term Goal  Description: Patient's Long Term Goal: Feel better    Interventions:  - PICC line for abx  - See additional Care Plan goals for specific interventions  Outcome: Adequate for Discharge  Goal: Patient/Family Short Term Goal  Description: Patient's Short Term Goal: walk further    Interventions:   - short frequent walks  - See additional Care Plan goals for specific interventions  Outcome: Adequate for Discharge     Problem: PAIN - ADULT  Goal: Verbalizes/displays adequate comfort level or patient's stated pain goal  Description: INTERVENTIONS:  - Encourage pt to monitor pain and request assistance  - Assess pain using appropriate pain scale  - Administer analgesics based on type and severity of pain and evaluate response  - Implement non-pharmacological measures as appropriate and evaluate response  - Consider cultural and social influences on pain and pain management  - Manage/alleviate anxiety  - Utilize distraction and/or relaxation techniques  - Monitor for opioid side effects  - Notify MD/LIP if interventions unsuccessful or patient reports new pain  - Anticipate increased pain with activity and pre-medicate as appropriate  Outcome: Adequate for Discharge     Problem: RISK FOR  INFECTION - ADULT  Goal: Absence of fever/infection during anticipated neutropenic period  Description: INTERVENTIONS  - Monitor WBC  - Administer growth factors as ordered  - Implement neutropenic guidelines  Outcome: Adequate for Discharge     Problem: SAFETY ADULT - FALL  Goal: Free from fall injury  Description: INTERVENTIONS:  - Assess pt frequently for physical needs  - Identify cognitive and physical deficits and behaviors that affect risk of falls.  - [place] fall precautions as indicated by assessment.  - Educate pt/family on patient safety including physical limitations  - Instruct pt to call for assistance with activity based on assessment  - Modify environment to reduce risk of injury  - Provide assistive devices as appropriate  - Consider OT/PT consult to assist with strengthening/mobility  - Encourage toileting schedule  Outcome: Adequate for Discharge     Problem: DISCHARGE PLANNING  Goal: Discharge to home or other facility with appropriate resources  Description: INTERVENTIONS:  - Identify barriers to discharge w/pt and caregiver  - Include patient/family/discharge partner in discharge planning  - Arrange for needed discharge resources and transportation as appropriate  - Identify discharge learning needs (meds, wound care, etc)  - Arrange for interpreters to assist at discharge as needed  - Consider post-discharge preferences of patient/family/discharge partner  - Complete POLST form as appropriate  - Assess patient's ability to be responsible for managing their own health  - Refer to Case Management Department for coordinating discharge planning if the patient needs post-hospital services based on physician/LIP order or complex needs related to functional status, cognitive ability or social support system  Outcome: Adequate for Discharge     Problem: SKIN/TISSUE INTEGRITY - ADULT  Goal: Skin integrity remains intact  Description: INTERVENTIONS  - Assess and document risk factors for pressure  ulcer development  - Assess and document skin integrity  - Monitor for areas of redness and/or skin breakdown  - Initiate interventions, skin care algorithm/standards of care as needed  Outcome: Adequate for Discharge  Goal: Incision(s), wounds(s) or drain site(s) healing without S/S of infection  Description: INTERVENTIONS:  - Assess and document risk factors for pressure ulcer development  - Assess and document skin integrity  - Assess and document dressing/incision, wound bed, drain sites and surrounding tissue  - Implement wound care per orders  - Initiate isolation precautions as appropriate  - Initiate Pressure Ulcer prevention bundle as indicated  Outcome: Adequate for Discharge     Problem: MUSCULOSKELETAL - ADULT  Goal: Return mobility to safest level of function  Description: INTERVENTIONS:  - Assess patient stability and activity tolerance for standing, transferring and ambulating w/ or w/o assistive devices  - Assist with transfers and ambulation using safe patient handling equipment as needed  - Ensure adequate protection for wounds/incisions during mobilization  - Obtain PT/OT consults as needed  - Advance activity as appropriate  - Communicate ordered activity level and limitations with patient/family  Outcome: Adequate for Discharge  Goal: Maintain proper alignment of affected body part  Description: INTERVENTIONS:  - Support and protect limb and body alignment per provider's orders  - Instruct and reinforce with patient and family use of appropriate assistive device and precautions (e.g. spinal or hip dislocation precautions)  Outcome: Adequate for Discharge     Problem: Impaired Functional Mobility  Goal: Achieve highest/safest level of mobility/gait  Description: Interventions:  - Assess patient's functional ability and stability  - Promote increasing activity/tolerance for mobility and gait  - Educate and engage patient/family in tolerated activity level and precautions  - Recommend use of  RW  for transfers and ambulation  Outcome: Adequate for Discharge     Problem: Impaired Activities of Daily Living  Goal: Achieve highest/safest level of independence in self care  Description: Interventions:  - Assess ability and encourage patient to participate in ADLs to maximize function  - Promote sitting position while performing ADLs such as feeding, grooming, and bathing  - Educate and encourage patient/family in tolerated functional activity level and precautions during self-care  - Encourage patient to incorporate impaired side during daily activities to promote function  Outcome: Adequate for Discharge

## 2024-05-08 NOTE — CM/SW NOTE
05/08/24 1100   / Referral Data   Referral Source Social Work (self-referral)   Reason for Referral Discharge planning   Informant Patient   Medical Hx   Does patient have an established PCP? Yes  (Dann Smith)   Patient Info   Patient's Current Mental Status at Time of Assessment Alert;Oriented   Patient's Home Environment House   Number of Levels in Home 2   Patient lives with Spouse/Significant other  (2 dtrs)   Patient Status Prior to Admission   Independent with ADLs and Mobility Yes   Discharge Needs   Anticipated D/C needs Infusion care       Per BOYD Dhaliwal - report this AM that pt will need IV abx at NH.    Per chart, no update from ID team yet in regards to final Abx need and Rx.    SW met w/ pt at bedside. Above assessment completed.    Pt confirmed living w/ his wife and 2 dtrs. They have a 2 level home. Pt is independent, works full time, and still drives.    Discussed PT/OT evals and Anticipated therapy need: Home with Home Healthcare    Pt confirmed he has an outpatient PT he has been seeing approx 1 yr and will f/up w/ them at NH.    SW discussed likely need for IV abx. SW explained options if the abx is daily as well as if the abx is needed more often.    Pt also mentioned 1x/week abx. SW confirmed that is a possibility and explained options if that is the final plan.    Pt is open to final options pending the final Abx Rx.    Per / leighann Son w/ [facility] confirmed pt is covered 100% for all services.    SW sent message to RN, BOYD Dhaliwal, and ID MD's to confirm final Abx plan.    Tentative referral to [facility] sent in Aidin.    PLAN: Home w/ poss IV Abx - pending Abx Rx & pt's final options/choice for services        / to remain available for support and/or discharge planning.         Nicole Marquez, MSW, LSW [id number]

## 2024-05-08 NOTE — PROGRESS NOTES
INFECTIOUS DISEASE PROGRESS NOTE    Byron Garcia Patient Status:  Inpatient    1985 MRN [id number]   Location [facility] 4W/SW/SE Attending Byron Mcfarland MD   Hosp Day # 4 PCP RAJIV CALVILLO       SUBJECTIVE  Continues to improve. Able to ambulate with walker.    ASSESSMENT/PLAN:    Antibiotics: vancomycin -, cefepime -     # H/o chronic back pain, s/p 23 L4/5 laminectomy.  S/p 24 L4/5 microdiscectomy.  S/p lumbar injection on 24.   # Acute worsening lumbar pain with MRI findings of discitis and possible small epidural abscess   - s/p IR aspiration 24 with 5 mL of turbid yellow fluid - cx GPC pairs/clusters     PLAN:     - d/c IV vancomycin, cefepime  -  start IV daptomycin and ceftriaxone  - f/up cx  -  place PICC  -  discharge on IV daptomycin + ceftriaxone x6 weeks - EOT 24  -  repeat MRI L-spine in 4 weeks  - monitor for improvement  - Follow fever curve, wbc  - Reviewed labs, micro, imaging reports  - d/w patient, Dr. Pittman, Dr. Dinero, staff     History of Present Illness:  Byron Garcia is a a(n) 38 year old male with chronic back pain, s/p 23 L4/5 laminectomy.  Was doing well postop for few months and pain worsened.  S/p 24 L4/5 microdiscectomy.  Continued pain, had lumbar injection on 24.  Now presents with progressive worsening pain over past few days causing difficulty ambulating due to pain.  No fever, no night sweats or chills.  MRI showed Edema signal in the posterior half of the L4-5 intervertebral disc with small contiguous ventral epidural collection measuring 1.5 x 0.5 x 1.0 centimeter. The findings are suspicious for discitis with ventral epidural phlegmon or abscess.   IR planning aspiration on .      OBJECTIVE  /66 (BP Location: Right arm)   Pulse 89   Temp 98.3 °F (36.8 °C) (Oral)   Resp 16   Wt 172 lb 11.2 oz (78.3 kg)   SpO2 97%   BMI 27.05 kg/m²     General: No acute distress. Alert.  HEENT: EOMI    Abdomen: Soft, nontender, nondistended.   Musculoskeletal: No edema  Integument: No rashes        Luis Fernando Salazar MD  [facility]  [phone]

## 2024-05-08 NOTE — PROGRESS NOTES
[facility]  part of [facility]    Neurosurgery Progress Note    Byron Garcia Patient Status:  Inpatient    1985 MRN [id number]   Location [facility] 4W/SW/SE Attending Kalen Torres MD   Hosp Day # 4 PCP RAJIV CALVILLO     Subjective:  Byron Garcia is a(n) 38 year old male, doing well this morning.  Reports extreme low back pain and left lower extremity radiating pain.  Currently on IV vancomycin and cefepime.  Biopsy growing preliminary gram-positive cocci.  Alert, orientated x3.  Cooperative.  No apparent distress.    Vital Signs:    Temp:  [97 °F (36.1 °C)-98.3 °F (36.8 °C)] 97 °F (36.1 °C)  Pulse:  [75-99] 79  Resp:  [18] 18  BP: (125-132)/(81-89) 129/84  SpO2:  [92 %-98 %] 98 %    I/O:  I/O last 3 completed shifts:  In: 350 [IV PIGGYBACK:350]  Out: 500 [Urine:500]    Inpatient Medications:    Current Facility-Administered Medications:     HYDROcodone-acetaminophen (Norco) 5-325 MG per tab 2 tablet, 2 tablet, Oral, Q6H PRN    HYDROcodone-acetaminophen (Norco) 5-325 MG per tab 1 tablet, 1 tablet, Oral, Q6H PRN    heparin (Porcine) 5000 UNIT/ML injection 5,000 Units, 5,000 Units, Subcutaneous, Q8H AIDAN    diazePAM (Valium) tab 10 mg, 10 mg, Oral, Q8H PRN    ceFEPIme (Maxipime) 1 g in sodium chloride 0.9% 100 mL IVPB-MBP, 1 g, Intravenous, Q8H    vancomycin (Vancocin) 1.25 g in sodium chloride 0.9% 250mL IVPB premix, 15 mg/kg, Intravenous, Q12H    buPROPion ER (Wellbutrin XL) 24 hr tab 300 mg, 300 mg, Oral, Before breakfast    gabapentin (Neurontin) tab 600 mg, 600 mg, Oral, TID    sodium chloride 0.9% infusion, , Intravenous, Continuous    acetaminophen (Tylenol Extra Strength) tab 500 mg, 500 mg, Oral, Q4H PRN    morphINE PF 2 MG/ML injection 1 mg, 1 mg, Intravenous, Q2H PRN **OR** morphINE PF 2 MG/ML injection 2 mg, 2 mg, Intravenous, Q2H PRN **OR** morphINE PF 4 MG/ML injection 4 mg, 4 mg, Intravenous, Q2H PRN    ondansetron (Zofran) 4 MG/2ML injection 4 mg,  4 mg, Intravenous, Q6H PRN    prochlorperazine (Compazine) 10 MG/2ML injection 5 mg, 5 mg, Intravenous, Q8H PRN    polyethylene glycol (PEG 3350) (Miralax) 17 g oral packet 17 g, 17 g, Oral, Daily PRN    sennosides (Senokot) tab 17.2 mg, 17.2 mg, Oral, Nightly PRN    bisacodyl (Dulcolax) 10 MG rectal suppository 10 mg, 10 mg, Rectal, Daily PRN    fleet enema (Fleet) 7-19 GM/118ML rectal enema 133 mL, 1 enema, Rectal, Once PRN    Labs:  Lab Results   Component Value Date    WBC 8.0 05/08/2024    HGB 15.4 05/08/2024    .0 05/08/2024    BUN 13 05/08/2024     05/08/2024    K 4.6 05/08/2024    CO2 33.0 (H) 05/08/2024     (H) 05/08/2024    ALB 3.8 05/05/2024    INR 1.00 05/05/2024    CRP 1.40 (H) 05/05/2024    ESRML 46 (H) 05/05/2024         Neurological Exam:  SStrength:  5 out of 5 in bilateral lower extremities    Sensation:  Intact in bilateral lower extremities throughout    Incision:  Clean/dry/intact. No erythema, discharge, warmth.     Abdomen:  Soft, non-distended, non-tender, with no rebound or guarding.  No peritoneal signs.   Extremities:  Non-tender, no lower extremity edema noted.        Imaging:  No results found.    Assessment/Plan:  Principal Problem:    Discitis of lumbar region      Byron Garcia is a(n) 38 year old male 2 months status post redo L4-5 MLD.  Concern for L4-5 discitis on MRI and IR guided biopsy was completed.  Preliminary growth shows gram-positive cocci.  Awaiting final growth results.  Appreciate recommendations per infectious disease for antibiotic use  Pain control  Medical management per hospitalist team  DVT prophylaxis heparin  Every 4 hour neurochecks  Prior to any neurosurgical intervention patient will need to be cleared of infection.  He will follow-up in our office thereafter because nothing is changed since yesterday    All questions and concerns were addressed. We appreciate the opportunity to participate in the care of this patient. Please do not  hesitate to call our office ([phone]) with any issues.    Hang Rice PA-C  5/8/2024  7:35 AM

## 2024-05-08 NOTE — PHYSICAL THERAPY NOTE
PHYSICAL THERAPY TREATMENT NOTE - INPATIENT     Room Number: 402/402-A       Presenting Problem: LBP and LE radiculopathy, S/P biopsy and drainage of fluid collection on   Co-Morbidities : L4-5 herniated disc, sciatica, cauda equina compression, S/P L4-5 laminectomy, discectomy 23, S/P lumbar injection 24    Problem List  Principal Problem:    Discitis of lumbar region      PHYSICAL THERAPY ASSESSMENT   Patient demonstrates good  progress this session, goals  remain in progress.    Patient continues to function below baseline with bed mobility, transfers, and gait.  Contributing factors to remaining limitations include decreased functional strength, decreased endurance/aerobic capacity, and pain.  Next session anticipate patient to progress bed mobility, transfers, and gait.  Physical Therapy will continue to follow patient for duration of hospitalization.    Patient continues to benefit from continued skilled PT services: at discharge to promote functional independence in home.  Anticipate patient will return home with home health PT.    PLAN  PT Treatment Plan: Bed mobility;Body mechanics;Endurance;Gait training  Frequency (Obs): 3-5x/week    SUBJECTIVE  Pt reports being ready for PT RX    OBJECTIVE  Precautions: Spine;Bed/chair alarm    WEIGHT BEARING RESTRICTION                PAIN ASSESSMENT   Ratin  Location: low back, improved since procedure with no radiating pain  Management Techniques: Body mechanics;Activity promotion    BALANCE  Static Sitting: Normal  Dynamic Sitting: Good  Static Standing: Good  Dynamic Standing: Fair +    ACTIVITY TOLERANCE                          O2 WALK       AM-PAC '6-Clicks' INPATIENT SHORT FORM - BASIC MOBILITY  How much difficulty does the patient currently have...  Patient Difficulty: Turning over in bed (including adjusting bedclothes, sheets and blankets)?: A Little   Patient Difficulty: Sitting down on and standing up from a chair with arms (e.g.,  wheelchair, bedside commode, etc.): None   Patient Difficulty: Moving from lying on back to sitting on the side of the bed?: A Little   How much help from another person does the patient currently need...   Help from Another: Moving to and from a bed to a chair (including a wheelchair)?: None   Help from Another: Need to walk in hospital room?: A Little   Help from Another: Climbing 3-5 steps with a railing?: A Little     AM-PAC Score:  Raw Score: 20   Approx Degree of Impairment: 35.83%   Standardized Score (AM-PAC Scale): 47.67   CMS Modifier (G-Code): CJ    FUNCTIONAL ABILITY STATUS  Functional Mobility/Gait Assessment  Gait Assistance: Contact guard assist  Distance (ft): 2 x 100  Assistive Device: Rolling walker  Pattern: Shuffle  Stairs: Stairs  How Many Stairs: 12  Device: 1 Rail  Assist: Contact guard assist  Pattern: Ascend and Descend  Rolling: minimal assist  Supine to Sit: minimal assist  Sit to Supine: minimal assist  Sit to Stand: minimal assist    Additional information: Pt seen daily.Min a for bed mobility and transfer.Extra time provided to complete task.EOB sitting balanc e activity with emphasis on core stabilization.Family present;family education;all questions and concerns addressed.Pt amb 2  x 100 ft with RW;and   CGA.Provided cuing for gait pattern as well as for postural awareness.Navigated 12 stairs with CGA.Spinal precautions reviewed;education.There ex.    The patient's Approx Degree of Impairment: 35.83% has been calculated based on documentation in the [facility] '6 clicks' Inpatient Daily Activity Short Form.  Research supports that patients with this level of impairment may benefit from Home  with Home Health PT.  Final disposition will be made by interdisciplinary medical team.    THERAPEUTIC EXERCISES  Lower Extremity Ankle pumps  Glut sets  Quad sets     Position Supine       Patient End of Session: Up in chair;Call light within reach;RN aware of session/findings;All patient questions  and concerns addressed    CURRENT GOALS     Patient Goal Patient's self-stated goal is: return home safely, decrease pain   Goal #1 Patient is able to demonstrate supine - sit EOB @ level: modified independent     Goal #1   Current Status Min a   Goal #2 Patient is able to demonstrate transfers EOB to/from [facility] at assistance level: independent with none     Goal #2  Current Status Darin    Goal #3 Patient is able to ambulate 150 feet with assist device: least restrictive assistive device or none at assistance level: modified independent   Goal #3   Current Status Pt amb 2 x 100 ft with RW and CGa   Goal #4 Patient will negotiate 7 stairs/one curb w/ assistive device and supervision   Goal #4   Current Status Navigated 12 stairs with CGA   Goal #5 Patient to demonstrate independence with home activity/exercise instructions provided to patient in preparation for discharge.   Goal #5   Current Status In progress   Goal #6    Goal #6  Current Status      Gait Training: 15 minutes  Therapeutic Activity: 15 minutes  Neuromuscular Re-education:  minutes  Therapeutic Exercise: 15 minutes  Canalith Repositioning:  minutes  Manual Therapy:  minutes  Can add/delete any of these

## 2024-05-08 NOTE — CM/SW NOTE
Addendum: 2:32:  Pt provided with appointment time and location.  Pt expressed understanding. Pt AVS updated to reflect appointment information.        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    [address] [place] [address] [address], [place] [address]  Hours:   Phone: [phone]  Appointment: Thursday, May 9, 2024 @ 10:45am    SW informed Adelaida @ [facility].    Pt currently to continue OP PT and is refusing HHC.    SW/CM to remain available for support and/or discharge planning.      Madhuri Wright, MSW, LSW  Social Work/Case Management

## 2024-06-03 ENCOUNTER — PATIENT MESSAGE (OUTPATIENT)
Dept: PHYSICAL MEDICINE AND REHAB | Facility: CLINIC | Age: 39
End: 2024-06-03

## 2024-06-03 ENCOUNTER — HOSPITAL ENCOUNTER (OUTPATIENT)
Dept: MRI IMAGING | Age: 39
Discharge: HOME | End: 2024-06-03
Attending: INTERNAL MEDICINE
Payer: COMMERCIAL

## 2024-06-03 DIAGNOSIS — M54.16 CHRONIC RIGHT-SIDED LUMBAR RADICULOPATHY: Primary | ICD-10-CM

## 2024-06-03 DIAGNOSIS — M46.46 DISCITIS OF LUMBAR REGION: ICD-10-CM

## 2024-06-03 PROCEDURE — 72158 MRI LUMBAR SPINE W/O & W/DYE: CPT | Performed by: INTERNAL MEDICINE

## 2024-06-03 PROCEDURE — A9575 INJ GADOTERATE MEGLUMI 0.1ML: HCPCS | Performed by: INTERNAL MEDICINE

## 2024-06-03 RX ORDER — GADOTERATE MEGLUMINE 376.9 MG/ML
20 INJECTION INTRAVENOUS
Status: COMPLETED | OUTPATIENT
Start: 2024-06-03 | End: 2024-06-03

## 2024-06-03 RX ADMIN — GADOTERATE MEGLUMINE 17 ML: 376.9 INJECTION INTRAVENOUS at 18:48:00

## 2024-06-10 ENCOUNTER — TELEPHONE (OUTPATIENT)
Dept: SURGERY | Facility: CLINIC | Age: 39
End: 2024-06-10

## 2024-06-10 RX ORDER — GABAPENTIN 300 MG/1
CAPSULE ORAL
Qty: 90 CAPSULE | Refills: 0 | Status: SHIPPED | OUTPATIENT
Start: 2024-06-10

## 2024-06-10 NOTE — TELEPHONE ENCOUNTER
Gabapentin = wants to go back down to the 300 mg Gabapentin = 600 mg too much-  Patient had too many side effects- felt shaky- went back to 300 mg TID- patient would like refill at the former dose of 300 mg TID.    Discussed patient's MCM and this RN recommended that they contact Dr. Dinero's office for this as anything to do with the incision and surgery should be addressed by the surgeon.  Also recommended they keep Dr. Salazar in the loop as he is currently under his care as well for the discitis.    Patient and patient's wife (Christen) verbalized understanding.      Notified Dr. Pittman of dose change request-awaiting response.

## 2024-06-10 NOTE — TELEPHONE ENCOUNTER
[place] office received a Telemedicine visit report from [facility] from Luis Fernando Salazar M.D.    Date of visit: 05/30/2024    Left at Hang Rice's desk for review.

## 2024-06-10 NOTE — TELEPHONE ENCOUNTER
Refill Request    Medication request: gabapentin 300 MG Oral Cap   Take 1 capsule (300 mg total) by mouth 3 (three) times daily with meals.     LOV:4/15/2024 (Telemedicine)Tavon Pittman,    Due back to clinic per last office note:  \"Follow-up:  1 month \"  NOV: Visit date not found      [place] 3/21/2024 #90 (300 mg) Note: patient was on 600 mg and requested to be brought back down to 300 mg.    Urine drug screen (if applicable): N/A  Pain contract: N/A    LOV plan (if weaning or changing medications): Per Dr. Pittman's LOV notes: \"I do believe some of his pain is heightened with underlying anxiety which has improved with the gabapentin and I have recommended increasing this to 600 mg 3 times daily. \"      NOTE:  Ok to go back down to 300 mg TID per Dr. Pittman

## 2024-06-18 ENCOUNTER — OFFICE VISIT (OUTPATIENT)
Dept: SURGERY | Facility: CLINIC | Age: 39
End: 2024-06-18

## 2024-06-18 VITALS
HEART RATE: 87 BPM | HEIGHT: 69 IN | WEIGHT: 174 LBS | DIASTOLIC BLOOD PRESSURE: 74 MMHG | SYSTOLIC BLOOD PRESSURE: 124 MMHG | BODY MASS INDEX: 25.77 KG/M2

## 2024-06-18 DIAGNOSIS — Z98.890 STATUS POST LUMBAR MICRODISCECTOMY: Primary | ICD-10-CM

## 2024-06-18 PROCEDURE — 99214 OFFICE O/P EST MOD 30 MIN: CPT | Performed by: NEUROLOGICAL SURGERY

## 2024-06-18 PROCEDURE — 3074F SYST BP LT 130 MM HG: CPT | Performed by: NEUROLOGICAL SURGERY

## 2024-06-18 PROCEDURE — 3008F BODY MASS INDEX DOCD: CPT | Performed by: NEUROLOGICAL SURGERY

## 2024-06-18 PROCEDURE — 3078F DIAST BP <80 MM HG: CPT | Performed by: NEUROLOGICAL SURGERY

## 2024-06-18 NOTE — PROGRESS NOTES
Last procedure: 2/28/24  Last office visit: 5/3/24  Pain Level: 1/10. Patient states that they are having pain located on their left leg.   Numbness / Tingling: Patient denies numbness and tingling.   Physical Therapy / Injections: Patient denies completing any recent Physical Therapy / Injections.

## 2024-06-18 NOTE — PROGRESS NOTES
[facility]        [facility]      [address]  [address]  [place] [address]    PHONE  [phone]          FAX  [phone]    [email or web address]      OFFICE FOLLOW-UP NOTE            Byron Garcia    : 1985    MRN: [id number]  CSN: [id number]      PCP: RAJIV CALVILLO  Referring Provider: No ref. provider found    Insurance: Payor: [facility] PPO / Plan: BCBS PPO / Product Type: PPO /           Date of Visit: 2024    Reason for Visit:   Chief Complaint    Follow - Up                         History of Present Care:  Byron Garcia is a a(n) 38 year old, male is now 8 weeks status post redo L4-5 microdiscectomy.  Patient was readmitted to the hospital at one point for suspected discitis.  He recent completed 6-week course of IV PICC line antibiotics and is now initiated oral antibiotics.  Patient is feeling much better in regard to his low back and lower extremity discomfort.  He occasionally has left lower extremity pain which radiates to the lateral calf but this is also much improved.  He is utilizing anti-inflammatories as needed for his discomfort.  He has not initiated physical therapy at this point in time but will start next week.      History:  .  Past Medical History:    Eye infection, right      Past Surgical History:   Procedure Laterality Date    Hand/finger surgery unlisted      Lumbar spine surgery  2023    L4-5 LUMBAR LAMINECTOMY, RESECTION OF DISC      Family History   Problem Relation Age of Onset    Cancer Father 62        Bladder      Social History     Socioeconomic History    Marital status:      Spouse name: Not on file    Number of children: Not on file    Years of education: Not on file    Highest education level: Not on file   Occupational History    Not on file   Tobacco Use    Smoking status: Every Day     Current packs/day: 0.50     Types:  Cigarettes    Smokeless tobacco: Never   Vaping Use    Vaping status: Never Used   Substance and Sexual Activity    Alcohol use: Yes    Drug use: Never    Sexual activity: Not on file   Other Topics Concern    Not on file   Social History Narrative    Not on file     Social Determinants of Health     Financial Resource Strain: Low Risk  (4/20/2024)    Received from [facility]    Overall Financial Resource Strain (CARDIA)     Difficulty of Paying Living Expenses: Not hard at all   Food Insecurity: No Food Insecurity (5/4/2024)    Food Insecurity     Food Insecurity: Never true   Transportation Needs: No Transportation Needs (5/4/2024)    Transportation Needs     Lack of Transportation: No   Physical Activity: Not on file   Stress: Not on file   Social Connections: Not on file   Housing Stability: Low Risk  (5/4/2024)    Housing Stability     Housing Instability: No     Housing Instability Emergency: Not on file     Crib or Bassinette: Not on file        Allergies:  No Known Allergies      Medications:  Current Outpatient Medications   Medication Sig Dispense Refill    gabapentin 300 MG Oral Cap Take 1 capsule (300 mg) in the morning, 1 capsule (300 mg) in the afternoon, and 1 capsule (300 mg) in the evening with meals. 90 capsule 0    buPROPion  MG Oral Tablet 24 Hr Take 1 tablet (300 mg total) by mouth before breakfast.          Review of Systems:  A 10-point system was reviewed.  Pertinent positives and negatives are noted in HPI.      Physical Exam:  /74   Pulse 87   Ht 69\"   Wt 174 lb (78.9 kg)   BMI 25.70 kg/m²         Neurological Exam:    AAOx3, following commands  PERRLA  EOMI  Face symmetrical  Tongue midline  Hearing symmetrical and intact to finger rub    No rhinorrhea or otorrhea    Romberg negative    Motor Strength:  Strength lower extremities 5 out of 5     Sensation to light touch:  Intact in bilateral lower extremities throughout    Incision:  Well-approximated.   No erythema, discharge, tenderness.  There is mild fluctuation of the midline incision.      Abdomen:  Soft, non-distended, non-tender, with no rebound or guarding.  No peritoneal signs.     Extremities:  Non-tender, no lower extremity edema noted.      Labs:  CBC:  Lab Results   Component Value Date    WBC 8.0 05/08/2024    HGB 15.4 05/08/2024    HCT 43.9 05/08/2024    MCV 91.1 05/08/2024    .0 05/08/2024      BMG:  Lab Results   Component Value Date     05/08/2024    K 4.6 05/08/2024    CO2 33.0 (H) 05/08/2024     05/08/2024    BUN 13 05/08/2024      INR:  Lab Results   Component Value Date    INR 1.00 05/05/2024          Diagnostics:  MRI lumbar spine dated 6/3/2024 reviewed:  Postoperative changes of right L4-5 hemilaminectomy.  Fluid signal extends from the hemilaminectomy defect superficially and posteriorly and may represent postoperative granulation tissue versus superimposed infection/discitis versus combination.    Diagnosis:  1. Status post lumbar microdiscectomy      Assessment/Plan:  Byron Garcia returns to the office for follow-up following completion of IV antibiotics and recent MRI of the lumbar spine.  Overall patient is feeling much improved relative to last time I saw him in office.  He continues to be stiff in the low back and has not initiated physical therapy at this point in time.  I have provided him with an order for physical therapy and we will plan to see him back in 6 weeks.  Ideally, we will not move forward with any additional surgical intervention.      More than 30 minutes were spent during this visit and the coordination of this patient's care. All questions and concerns were addressed. We appreciate the opportunity to participate in the care of this patient. Please do not hesitate to call our office ([phone]) with any issues.    This note has been dictated utilizing voice recognition software. Unfortunately, this may lead to occasional  typographical errors. If there are any questions regarding this, please do not hesitate to contact our office.         Virgil Dinero MD    6/18/2024  4:06 PM

## 2024-06-18 NOTE — PATIENT INSTRUCTIONS
Refill policies:    Allow 2-3 business days for refills; controlled substances may take longer.  Contact your pharmacy at least 5 days prior to running out of medication and have them send an electronic request or submit request through the “request refill” option in your [employer] account.  Refills are not addressed on weekends; covering physicians do not authorize routine medications on weekends.  No narcotics or controlled substances are refilled after noon on Fridays or by on call physicians.  By law, narcotics must be electronically prescribed.  A 30 day supply with no refills is the maximum allowed.  If your prescription is due for a refill, you may be due for a follow up appointment.  To best provide you care, patients receiving routine medications need to be seen at least once a year.  Patients receiving narcotic/controlled substance medications need to be seen at least once every 3 months.  In the event that your preferred pharmacy does not have the requested medication in stock (e.g. Backordered), it is your responsibility to find another pharmacy that has the requested medication available.  We will gladly send a new prescription to that pharmacy at your request.    Scheduling Tests:    If your physician has ordered radiology tests such as MRI or CT scans, please contact Central Scheduling at [phone] right away to schedule the test.  Once scheduled, the [facility] Centralized Referral Team will work with your insurance carrier to obtain pre-certification or prior authorization.  Depending on your insurance carrier, approval may take 3-10 days.  It is highly recommended patients assure they have received an authorization before having a test performed.  If test is done without insurance authorization, patient may be responsible for the entire amount billed.      Precertification and Prior Authorizations:  If your physician has recommended that you have a procedure or additional testing performed the [facility]  Centralized Referral Team will contact your insurance carrier to obtain pre-certification or prior authorization.    You are strongly encouraged to contact your insurance carrier to verify that your procedure/test has been approved and is a COVERED benefit.  Although the [facility] Centralized Referral Team does its due diligence, the insurance carrier gives the disclaimer that \"Although the procedure is authorized, this does not guarantee payment.\"    Ultimately the patient is responsible for payment.   Thank you for your understanding in this matter.  Paperwork Completion:  If you require FMLA or disability paperwork for your recovery, please make sure to either drop it off or have it faxed to our office at [phone]. Be sure the form has your name and date of birth on it.  The form will be faxed to our Forms Department and they will complete it for you.  There is a 25$ fee for all forms that need to be filled out.  Please be aware there is a 10-14 day turnaround time.  You will need to sign a release of information (TED) form if your paperwork does not come with one.  You may call the Forms Department with any questions at [phone].  Their fax number is [phone].

## 2024-07-01 ENCOUNTER — MED REC SCAN ONLY (OUTPATIENT)
Dept: PHYSICAL MEDICINE AND REHAB | Facility: CLINIC | Age: 39
End: 2024-07-01

## 2024-07-18 ENCOUNTER — OFFICE VISIT (OUTPATIENT)
Facility: CLINIC | Age: 39
End: 2024-07-18
Payer: COMMERCIAL

## 2024-07-18 VITALS
SYSTOLIC BLOOD PRESSURE: 120 MMHG | HEART RATE: 83 BPM | HEIGHT: 69 IN | DIASTOLIC BLOOD PRESSURE: 80 MMHG | WEIGHT: 174 LBS | BODY MASS INDEX: 25.77 KG/M2

## 2024-07-18 DIAGNOSIS — Z98.890 STATUS POST LUMBAR MICRODISCECTOMY: Primary | ICD-10-CM

## 2024-07-18 PROCEDURE — 3008F BODY MASS INDEX DOCD: CPT

## 2024-07-18 PROCEDURE — 99214 OFFICE O/P EST MOD 30 MIN: CPT

## 2024-07-18 PROCEDURE — 3074F SYST BP LT 130 MM HG: CPT

## 2024-07-18 PROCEDURE — 3079F DIAST BP 80-89 MM HG: CPT

## 2024-08-07 ENCOUNTER — MED REC SCAN ONLY (OUTPATIENT)
Dept: PHYSICAL MEDICINE AND REHAB | Facility: CLINIC | Age: 39
End: 2024-08-07

## 2024-08-15 DIAGNOSIS — M54.16 CHRONIC RIGHT-SIDED LUMBAR RADICULOPATHY: ICD-10-CM

## 2024-08-15 RX ORDER — GABAPENTIN 300 MG/1
CAPSULE ORAL
Qty: 90 CAPSULE | Refills: 0 | Status: SHIPPED | OUTPATIENT
Start: 2024-08-15

## 2024-08-15 NOTE — TELEPHONE ENCOUNTER
Refill Request    Medication request: gabapentin 300 MG Oral Cap Take 1 capsule (300 mg) in the morning, 1 capsule (300 mg) in the afternoon, and 1 capsule (300 mg) in the evening with meals.     LOV:4/15/2024 Tavon Pittman, DO   Due back to clinic per last office note:  \"Follow-up:  1 month\"  NOV: Visit date not found      [place] 6/10/2024 #90    Urine drug screen (if applicable): n/a  Pain contract: n/a    LOV plan (if weaning or changing medications): Per Dr. Pittman's LOV notes: \"I do believe some of his pain is heightened with underlying anxiety which has improved with the gabapentin and I have recommended increasing this to 600 mg 3 times daily. \"      NOTE:  Per 6/03/24 [facility] Encounter: \"  Gabapentin = wants to go back down to the 300 mg Gabapentin = 600 mg too much-  Patient had too many side effects- felt shaky- went back to 300 mg TID- patient would like refill at the former dose of 300 mg TID.  This was approved by Dr. Pittman.

## 2024-09-23 ENCOUNTER — MED REC SCAN ONLY (OUTPATIENT)
Dept: PHYSICAL MEDICINE AND REHAB | Facility: CLINIC | Age: 39
End: 2024-09-23

## 2024-09-26 ENCOUNTER — MED REC SCAN ONLY (OUTPATIENT)
Dept: SURGERY | Facility: CLINIC | Age: 39
End: 2024-09-26

## 2024-11-21 ENCOUNTER — MED REC SCAN ONLY (OUTPATIENT)
Dept: PHYSICAL MEDICINE AND REHAB | Facility: CLINIC | Age: 39
End: 2024-11-21

## 2025-08-01 ENCOUNTER — MED REC SCAN ONLY (OUTPATIENT)
Dept: SURGERY | Facility: CLINIC | Age: 40
End: 2025-08-01

## (undated) DEVICE — PENCIL ES BTTN SWCH W/ TIP HOLSTER E-Z CLN

## (undated) DEVICE — DISPOSABLE SLIM BIPOLAR FORCEPS, NON-STICK,: Brand: SPETZLER-MALIS

## (undated) DEVICE — SPONGE GZ 4XL4IN 100% COT 12 PLY TYP VII WVN

## (undated) DEVICE — KIT HEMSTAT MTRX 8ML PORCINE GEL HUM THROM

## (undated) DEVICE — COVER MAYOSTAND 23X54IN NWVN FAB

## (undated) DEVICE — ADHESIVE SKIN TOP FOR WND CLSR DERMBND ADV

## (undated) DEVICE — 6 ML SYRINGE LUER-LOCK TIP: Brand: MONOJECT

## (undated) DEVICE — ENCORE® LATEX MICRO SIZE 8, STERILE LATEX POWDER-FREE SURGICAL GLOVE: Brand: ENCORE

## (undated) DEVICE — APPLICATOR SKIN PREP 26ML HI LT ORNG 2% CHG

## (undated) DEVICE — SPK10186 WILSON TABLE KIT: Brand: SPK10186 WILSON TABLE KIT

## (undated) DEVICE — ELECTRODE ES L16.5CM BLDE MPLR OPN APPRCH EZ

## (undated) DEVICE — LAMINECTOMY: Brand: MEDLINE INDUSTRIES, INC.

## (undated) DEVICE — SUCTION CANISTER, 3000CC,SAFELINER: Brand: DEROYAL

## (undated) DEVICE — MICRO KOVER: Brand: UNBRANDED

## (undated) DEVICE — GAMMEX® PI HYBRID SIZE 7.5, STERILE POWDER-FREE SURGICAL GLOVE, POLYISOPRENE AND NEOPRENE BLEND: Brand: GAMMEX

## (undated) DEVICE — SUTURE MCRYL SZ 3-0 L18IN ABSRB UD L19MM PS-2

## (undated) DEVICE — STANDARD HYPODERMIC NEEDLE,ALUMINUM HUB: Brand: MONOJECT

## (undated) DEVICE — TUBING MEGADYNE SPECULUM

## (undated) DEVICE — CONTAINER,SPECIMEN,OR STERILE,4OZ: Brand: MEDLINE

## (undated) DEVICE — 20 ML SYRINGE LUER-LOCK TIP: Brand: MONOJECT

## (undated) DEVICE — 12 ML SYRINGE LUER-LOCK TIP: Brand: MONOJECT

## (undated) DEVICE — SET EXTN 2.7ML TBNG L20IN DIA0.100IN MACBOR

## (undated) DEVICE — STANDARD HYPODERMIC NEEDLE,POLYPROPYLENE HUB: Brand: MONOJECT

## (undated) DEVICE — OR TOWEL, 17" X 26" STERILE, BLUE: Brand: PREMIERPRO

## (undated) DEVICE — SUTURE VCRL SZ 0 L18IN ABSRB VLT L26MM CT-2

## (undated) DEVICE — WRAP COOLING BACK W/NO PILLOW

## (undated) DEVICE — SOLUTION IRRIG 1000ML 0.9% NACL USP BTL

## (undated) DEVICE — NEEDLE ANES 22GA L3.5IN SPNL SS QNCKE STYL

## (undated) DEVICE — SUT VCRL 3-0 18IN CP-2 ABSRB UD CR L26MM 1/2

## (undated) DEVICE — GAMMEX® PI HYBRID SIZE 8, STERILE POWDER-FREE SURGICAL GLOVE, POLYISOPRENE AND NEOPRENE BLEND: Brand: GAMMEX

## (undated) DEVICE — INTENDED USE FOR SURGICAL MARKING ON INTACT SKIN, ALSO PROVIDES A PERMANENT METHOD OF IDENTIFYING OBJECTS IN THE OPERATING ROOM: Brand: WRITESITE® PLUS MINI PREP RESISTANT MARKER

## (undated) DEVICE — SUTURE VCRL SZ 3-0 L18IN ABSRB UD CP-2 L26MM

## (undated) DEVICE — 4-WAY HIGH FLOW STOPCOCK W/ROTATING LUER: Brand: ICU MEDICAL

## (undated) DEVICE — GAMMEX® PI HYBRID SIZE 7, STERILE POWDER-FREE SURGICAL GLOVE, POLYISOPRENE AND NEOPRENE BLEND: Brand: GAMMEX

## (undated) DEVICE — SUT MCRYL 3-0 18IN PS-2 ABSRB UD 19MM 3/8 CIR

## (undated) DEVICE — APPLICATOR PREP 10.5ML ORNG CHG 2% ISO ALC

## (undated) DEVICE — EXTENDED TIP APPLICATOR EXTENDED TIP APPLICATOR, SINGLE USE APPLICATOR, 15CM: Brand: EXTENDED TIP APPLICATOR

## (undated) DEVICE — SUT VCRL 0 18IN CT-2 ABSRB VLT CR L26MM 1/2

## (undated) DEVICE — Device

## (undated) DEVICE — PRECISION MATCH HEAD

## (undated) NOTE — LETTER
[place] [address]  Authorization for Invasive Procedures  Date: 04/01/2024           Time: 1130    I hereby authorize Tavon Pittman DO, my physician and his/her assistants (if applicable), which may include medical students, residents, and/or fellows, to perform the following surgical operation/ procedure and administer such anesthesia as may be determined necessary by my physician: L4-L5 Transforaminal epidural steroid injection and right L4-L5 Facet Cyst  on Byron Simmonszen  2.   I recognize that during the surgical operation/procedure, unforeseen conditions may necessitate additional or different procedures than those listed above.  I, therefore, further authorize and request that the above-named surgeon, assistants, or designees perform such procedures as are, in their judgment, necessary and desirable.    3.   My surgeon/physician has discussed prior to my surgery the potential benefits, risks and side effects of this procedure; the likelihood of achieving goals; and potential problems that might occur during recuperation.  They also discussed reasonable alternatives to the procedure, including risks, benefits, and side effects related to the alternatives and risks related to not receiving this procedure.  I have had all my questions answered and I acknowledge that no guarantee has been made as to the result that may be obtained.    4.   Should the need arise during my operation/procedure, which includes change of level of care prior to discharge, I also consent to the administration of blood and/or blood products.  Further, I understand that despite careful testing and screening of blood or blood products by collecting agencies, I may still be subject to ill effects as a result of receiving a blood transfusion and/or blood products.  The following are some, but not all, of the potential risks that can occur: fever and allergic reactions, hemolytic reactions, transmission of  diseases such as Hepatitis, AIDS and Cytomegalovirus (CMV) and fluid overload.  In the event that I wish to have an autologous transfusion of my own blood, or a directed donor transfusion, I will discuss this with my physician.   Check only if Refusing Blood or Blood Products  I understand refusal of blood or blood products as deemed necessary by my physician may have serious consequences to my condition to include possible death. I hereby assume responsibility for my refusal and release the hospital, its personnel, and my physicians from any responsibility for the consequences of my refusal.         o  Refuse         5.   I authorize the use of any specimen, organs, tissues, body parts or foreign objects that may be removed from my body during the operation/procedure for diagnosis, research or teaching purposes and their subsequent disposal by hospital authorities.  I also authorize the release of specimen test results and/or written reports to my treating physician on the hospital medical staff or other referring or consulting physicians involved in my care, at the discretion of the Pathologist or my treating physician.    6.   I consent to the photographing or videotaping of the operations or procedures to be performed, including appropriate portions of my body for medical, scientific, or educational purposes, provided my identity is not revealed by the pictures or by descriptive texts accompanying them.  If the procedure has been photographed/videotaped, the surgeon will obtain the original picture, image, videotape or CD.  The hospital will not be responsible for storage, release or maintenance of the picture, image, tape or CD.    7.   I consent to the presence of a  or observers in the operating room as deemed necessary by my physician or their designees.    8.   I recognize that in the event my procedure results in extended X-Ray/fluoroscopy time, I may develop a skin reaction.    9. If I  have a Do Not Attempt Resuscitation (DNAR) order in place, that status will be suspended while in the operating room, procedural suite, and during the recovery period unless otherwise explicitly stated by me (or a person authorized to consent on my behalf). The surgeon or my attending physician will determine when the applicable recovery period ends for purposes of reinstating the DNAR order.  10. Patients having a sterilization procedure: I understand that if the procedure is successful the results will be permanent and it will therefore be impossible for me to inseminate, conceive, or bear children.  I also understand that the procedure is intended to result in sterility, although the result has not been guaranteed.   11. I acknowledge that my physician has explained sedation/analgesia administration to me including the risk and benefits I consent to the administration of sedation/analgesia as may be necessary or desirable in the judgment of my physician.    I CERTIFY THAT I HAVE READ AND FULLY UNDERSTAND THE ABOVE CONSENT TO OPERATION and/or OTHER PROCEDURE.        ____________________________________       _________________________________      ______________________________  Signature of Patient         Signature of Responsible Person        Printed Name of Responsible Person        ____________________________________      _________________________________      ______________________________       Signature of Witness          Relationship to Patient                       Date                                       Time  Patient Name: Byron Garcia  : 1985    Reviewed: 2024   Printed: 2024  Medical Record #: [id number] Page 1 of 2             STATEMENT OF PHYSICIAN My signature below affirms that prior to the time of the procedure; I have explained to the patient and/or his/her legal representative, the risks and benefits involved in the proposed treatment and any reasonable  alternative to the proposed treatment. I have also explained the risks and benefits involved in refusal of the proposed treatment and alternatives to the proposed treatment and have answered the patient's questions. If I have a significant financial interest in a co-management agreement or a significant financial interest in any product or implant, or other significant relationship used in this procedure/surgery, I have disclosed this and had a discussion with my patient.     _______________________________________________________________ _____________________________  (Signature of Physician)                                                                                         (Date)                                   (Time)  Patient Name: Byron Garcia  : 1985    Reviewed: 2024   Printed: 2024  Medical Record #: [id number] Page 2 of 2

## (undated) NOTE — LETTER
[facility]  [address], [place], [place]  Authorization for Surgical Operation and Procedure                                                                                           I hereby authorize DR. RIGGS, my physician and his/her assistants (if applicable), which may include medical students, residents, and/or fellows, to perform the following surgical operation/ procedure and administer such anesthesia as may be determined necessary by my physician: CT GUIDED PARASPINAL FLUID ASPIRATION / BIOPSY on Byron Garcia   2.   I recognize that during the surgical operation/procedure, unforeseen conditions may necessitate additional or different procedures than those listed above.  I, therefore, further authorize and request that the above-named surgeon, assistants, or designees perform such procedures as are, in their judgment, necessary and desirable.    3.   My surgeon/physician has discussed prior to my surgery the potential benefits, risks and side effects of this procedure; the likelihood of achieving goals; and potential problems that might occur during recuperation.  They also discussed reasonable alternatives to the procedure, including risks, benefits, and side effects related to the alternatives and risks related to not receiving this procedure.  I have had all my questions answered and I acknowledge that no guarantee has been made as to the result that may be obtained.    4.   Should the need arise during my operation/procedure, which includes change of level of care prior to discharge, I also consent to the administration of blood and/or blood products.  Further, I understand that despite careful testing and screening of blood or blood products by collecting agencies, I may still be subject to ill effects as a result of receiving a blood transfusion and/or blood products.  The following are some, but not all, of the potential risks that can occur: fever and allergic reactions,  hemolytic reactions, transmission of diseases such as Hepatitis, AIDS and Cytomegalovirus (CMV) and fluid overload.  In the event that I wish to have an autologous transfusion of my own blood, or a directed donor transfusion, I will discuss this with my physician.  Check only if Refusing Blood or Blood Products  I understand refusal of blood or blood products as deemed necessary by my physician may have serious consequences to my condition to include possible death. I hereby assume responsibility for my refusal and release the hospital, its personnel, and my physicians from any responsibility for the consequences of my refusal.    o  Refuse   5.   I authorize the use of any specimen, organs, tissues, body parts or foreign objects that may be removed from my body during the operation/procedure for diagnosis, research or teaching purposes and their subsequent disposal by hospital authorities.  I also authorize the release of specimen test results and/or written reports to my treating physician on the hospital medical staff or other referring or consulting physicians involved in my care, at the discretion of the Pathologist or my treating physician.    6.   I consent to the photographing or videotaping of the operations or procedures to be performed, including appropriate portions of my body for medical, scientific, or educational purposes, provided my identity is not revealed by the pictures or by descriptive texts accompanying them.  If the procedure has been photographed/videotaped, the surgeon will obtain the original picture, image, videotape or CD.  The hospital will not be responsible for storage, release or maintenance of the picture, image, tape or CD.    7.   I consent to the presence of a  or observers in the operating room as deemed necessary by my physician or their designees.    8.   I recognize that in the event my procedure results in extended X-Ray/fluoroscopy time, I may develop a  skin reaction.    9. If I have a Do Not Attempt Resuscitation (DNAR) order in place, that status will be suspended while in the operating room, procedural suite, and during the recovery period unless otherwise explicitly stated by me (or a person authorized to consent on my behalf). The surgeon or my attending physician will determine when the applicable recovery period ends for purposes of reinstating the DNAR order.  10. Patients having a sterilization procedure: I understand that if the procedure is successful the results will be permanent and it will therefore be impossible for me to inseminate, conceive, or bear children.  I also understand that the procedure is intended to result in sterility, although the result has not been guaranteed.   11. I acknowledge that my physician has explained sedation/analgesia administration to me including the risk and benefits I consent to the administration of sedation/analgesia as may be necessary or desirable in the judgment of my physician.    I CERTIFY THAT I HAVE READ AND FULLY UNDERSTAND THE ABOVE CONSENT TO OPERATION and/or OTHER PROCEDURE.     _________________________________________ _________________________________     ___________________________________  Signature of Patient     Signature of Responsible Person                   Printed Name of Responsible Person                              _________________________________________ ______________________________        ___________________________________  Signature of Witness         Date  Time         Relationship to Patient    STATEMENT OF PHYSICIAN My signature below affirms that prior to the time of the procedure; I have explained to the patient and/or his/her legal representative, the risks and benefits involved in the proposed treatment and any reasonable alternative to the proposed treatment. I have also explained the risks and benefits involved in refusal of the proposed treatment and alternatives to the  proposed treatment and have answered the patient's questions. If I have a significant financial interest in a co-management agreement or a significant financial interest in any product or implant, or other significant relationship used in this procedure/surgery, I have disclosed this and had a discussion with my patient.     _______________________________________________________________ _____________________________  (Signature of Physician)                                                                                         (Date)                                   (Time)  Patient Name: Byron Simmonszen    : 1985   Printed: 2024      Medical Record #: [id number]                                              Page 1 of 1

## (undated) NOTE — LETTER
[facility]  Administration of Anesthesia  I, Byron Garcia agree to be cared for by a physician anesthesiologist alone and/or with a nurse anesthetist, who is specially trained to monitor me and give me medicine to put me to sleep or keep me comfortable during my procedure    I understand that my anesthesiologist and/or anesthetist is not an employee or agent of [facility] or [employer]. He or she works for [facility]    As the patient asking for anesthesia services, I agree to:  Allow the anesthesiologist (anesthesia doctor) to give me medicine and do additional procedures as necessary. Some examples are: Starting or using an “IV” to give me medicine, fluids or blood during my procedure, and having a breathing tube placed to help me breathe when I’m asleep (intubation). In the event that my heart stops working properly, I understand that my anesthesiologist will make every effort to sustain my life, unless otherwise directed by [facility] Do Not Resuscitate documents.  Tell my anesthesia doctor before my procedure:  If I am pregnant.  The last time that I ate or drank.  iii. All of the medicines I take (including prescriptions, herbal supplements, and pills I can buy without a prescription (including street drugs/illegal medications). Failure to inform my anesthesiologist about these medicines may increase my risk of anesthetic complications.  iv.If I am allergic to anything or have had a reaction to anesthesia before.  I understand how the anesthesia medicine will help me (benefits).  I understand that with any type of anesthesia medicine there are risks:  The most common risks are: nausea, vomiting, sore throat, muscle soreness, damage to my eyes, mouth, or teeth (from breathing tube placement).  Rare risks include: remembering what happened during my procedure, allergic reactions to medications, injury to my airway, heart, lungs, vision, nerves, or  muscles and in extremely rare instances death.  My doctor has explained to me other choices available to me for my care (alternatives).  Pregnant Patients (“epidural”):  I understand that the risks of having an epidural (medicine given into my back to help control pain during labor), include itching, low blood pressure, difficulty urinating, headache or slowing of the baby’s heart. Very rare risks include infection, bleeding, seizure, irregular heart rhythms and nerve injury.  Regional Anesthesia (“spinal”, “epidural”, & “nerve blocks”):  I understand that rare but potential complications include headache, bleeding, infection, seizure, irregular heart rhythms, and nerve injury.    _____________________________________________________________________________  Patient (or Representative) Signature/Relationship to Patient  Date   Time    _____________________________________________________________________________   Name (if used)    Language/Organization   Time    _____________________________________________________________________________  Nurse Anesthetist Signature     Date   Time  _____________________________________________________________________________  Anesthesiologist Signature     Date   Time  I have discussed the procedure and information above with the patient (or patient’s representative) and answered their questions. The patient or their representative has agreed to have anesthesia services.    _____________________________________________________________________________  Witness        Date   Time  I have verified that the signature is that of the patient or patient’s representative, and that it was signed before the procedure  Patient Name: Byron Garcia     : 1985                 Printed: 2024 at 11:34 AM    Medical Record #: [id number]                                            Page 1 of 1  ----------ANESTHESIA CONSENT----------

## (undated) NOTE — LETTER
[place] [address]  Authorization for Invasive Procedures  Date: 05/06/24           Time: 07:58am    I hereby authorize Dr. Moreno, my physician and his/her assistants (if applicable), which may include medical students, residents, and/or fellows, to perform the following surgical operation/ procedure and administer such anesthesia as may be determined necessary by my physician: Lumbar 4-5 disk biopsy on Byron Garcia  2.   I recognize that during the surgical operation/procedure, unforeseen conditions may necessitate additional or different procedures than those listed above.  I, therefore, further authorize and request that the above-named surgeon, assistants, or designees perform such procedures as are, in their judgment, necessary and desirable.    3.   My surgeon/physician has discussed prior to my surgery the potential benefits, risks and side effects of this procedure; the likelihood of achieving goals; and potential problems that might occur during recuperation.  They also discussed reasonable alternatives to the procedure, including risks, benefits, and side effects related to the alternatives and risks related to not receiving this procedure.  I have had all my questions answered and I acknowledge that no guarantee has been made as to the result that may be obtained.    4.   Should the need arise during my operation/procedure, which includes change of level of care prior to discharge, I also consent to the administration of blood and/or blood products.  Further, I understand that despite careful testing and screening of blood or blood products by collecting agencies, I may still be subject to ill effects as a result of receiving a blood transfusion and/or blood products.  The following are some, but not all, of the potential risks that can occur: fever and allergic reactions, hemolytic reactions, transmission of diseases such as Hepatitis, AIDS and Cytomegalovirus (CMV)  and fluid overload.  In the event that I wish to have an autologous transfusion of my own blood, or a directed donor transfusion, I will discuss this with my physician.   Check only if Refusing Blood or Blood Products  I understand refusal of blood or blood products as deemed necessary by my physician may have serious consequences to my condition to include possible death. I hereby assume responsibility for my refusal and release the hospital, its personnel, and my physicians from any responsibility for the consequences of my refusal.         o  Refuse         5.   I authorize the use of any specimen, organs, tissues, body parts or foreign objects that may be removed from my body during the operation/procedure for diagnosis, research or teaching purposes and their subsequent disposal by hospital authorities.  I also authorize the release of specimen test results and/or written reports to my treating physician on the hospital medical staff or other referring or consulting physicians involved in my care, at the discretion of the Pathologist or my treating physician.    6.   I consent to the photographing or videotaping of the operations or procedures to be performed, including appropriate portions of my body for medical, scientific, or educational purposes, provided my identity is not revealed by the pictures or by descriptive texts accompanying them.  If the procedure has been photographed/videotaped, the surgeon will obtain the original picture, image, videotape or CD.  The hospital will not be responsible for storage, release or maintenance of the picture, image, tape or CD.    7.   I consent to the presence of a  or observers in the operating room as deemed necessary by my physician or their designees.    8.   I recognize that in the event my procedure results in extended X-Ray/fluoroscopy time, I may develop a skin reaction.    9. If I have a Do Not Attempt Resuscitation (DNAR) order in place,  that status will be suspended while in the operating room, procedural suite, and during the recovery period unless otherwise explicitly stated by me (or a person authorized to consent on my behalf). The surgeon or my attending physician will determine when the applicable recovery period ends for purposes of reinstating the DNAR order.  10. Patients having a sterilization procedure: I understand that if the procedure is successful the results will be permanent and it will therefore be impossible for me to inseminate, conceive, or bear children.  I also understand that the procedure is intended to result in sterility, although the result has not been guaranteed.   11. I acknowledge that my physician has explained sedation/analgesia administration to me including the risk and benefits I consent to the administration of sedation/analgesia as may be necessary or desirable in the judgment of my physician.    I CERTIFY THAT I HAVE READ AND FULLY UNDERSTAND THE ABOVE CONSENT TO OPERATION and/or OTHER PROCEDURE.        ____________________________________       _________________________________      ______________________________  Signature of Patient         Signature of Responsible Person        Printed Name of Responsible Person        ____________________________________      _________________________________      ______________________________       Signature of Witness          Relationship to Patient                       Date                                       Time  Patient Name: Byron Garcia  : 1985    Reviewed: 2024   Printed: May 6, 2024  Medical Record #: [id number] Page 1 of 2             STATEMENT OF PHYSICIAN My signature below affirms that prior to the time of the procedure; I have explained to the patient and/or his/her legal representative, the risks and benefits involved in the proposed treatment and any reasonable alternative to the proposed treatment. I have also explained the risks  and benefits involved in refusal of the proposed treatment and alternatives to the proposed treatment and have answered the patient's questions. If I have a significant financial interest in a co-management agreement or a significant financial interest in any product or implant, or other significant relationship used in this procedure/surgery, I have disclosed this and had a discussion with my patient.     _______________________________________________________________ _____________________________  (Signature of Physician)                                                                                         (Date)                                   (Time)  Patient Name: Byron Garcia  : 1985    Reviewed: 2024   Printed: May 6, 2024  Medical Record #: [id number] Page 2 of 2

## (undated) NOTE — LETTER
[facility]  part of [facility]     PICC INSERTION CONSENT     I agree to have a Peripherally Inserted Central Catheter (PICC) placed in my arm.   1. The PICC insertion procedure, care, maintenance, risks, benefits, and complications have been explained to me by my physician, ________________________, and I understand them.   2. I understand that this may not be the only way I can receive my medication. I understand that my physician has determined that the PICC would be the safest and most effective means of administering my medication at this time. If there are other options of giving medication into my veins those options have been explained to me by my physician and I have chosen this one.   3. I realize a nurse who has been specially trained and certified by the hospital and ’s representative to insert a PICC will perform this procedure. My catheter will be inserted by _____________________________.   4. I have been informed by my doctor of the nature and purpose of this procedure and the risks involved and the possibility of complications. I realize that this is an invasive procedure and has certain risks such as air embolism (air entering the catheter or my vein), arterial puncture (a tearing of one of my arteries), infection, irregular heartbeat and venous thrombosis (a blood clot in a vein) nerve injury and fracture of the catheter with or without migration.   5. In order to numb the area where the line will be placed, a small amount of anesthetic medication will be injected as ordered by my physician.   6. I understand that while the catheter will be placed in my upper arm the end of the catheter will come to rest in an area near my heart.     7. The person performing this procedure has discussed the potential benefits, risks, and side effects of the PICC; the likelihood of achieving goals; and potential problems that might occur during recuperation. They also discussed  reasonable alternatives to the PICC, including risks, benefits, and side effects related to the alternatives and risks related to not receiving this procedure.    8.  I have expressed any questions about this procedure to my physician or the PICC Proceduralist and he/she has answered them.  I certify that I have read and understand this consent to the insertion of a PICC.      _________________________________________________________   Date     Time     Patient/Guardian Signature       ____________________________________   Printed name of Patient/Guardian          ________________________________________________________________    Date        Time                   Witnessing RN Signature      Patient Name: Byron Garcia     : 1985                 Printed: May 8, 2024     Medical Record #: [id number]